# Patient Record
Sex: FEMALE | Race: BLACK OR AFRICAN AMERICAN | Employment: OTHER | URBAN - METROPOLITAN AREA
[De-identification: names, ages, dates, MRNs, and addresses within clinical notes are randomized per-mention and may not be internally consistent; named-entity substitution may affect disease eponyms.]

---

## 2017-01-18 RX ORDER — LEVOCETIRIZINE DIHYDROCHLORIDE 5 MG/1
TABLET, FILM COATED ORAL
Qty: 30 TAB | Refills: 7 | Status: SHIPPED | OUTPATIENT
Start: 2017-01-18 | End: 2017-10-15 | Stop reason: SDUPTHER

## 2017-01-24 RX ORDER — PRAVASTATIN SODIUM 20 MG/1
TABLET ORAL
Qty: 90 TAB | Refills: 3 | Status: SHIPPED | OUTPATIENT
Start: 2017-01-24 | End: 2017-03-13 | Stop reason: SDUPTHER

## 2017-02-15 DIAGNOSIS — I10 ESSENTIAL HYPERTENSION: ICD-10-CM

## 2017-02-15 RX ORDER — FUROSEMIDE 20 MG/1
TABLET ORAL
Qty: 90 TAB | Refills: 2 | Status: SHIPPED | OUTPATIENT
Start: 2017-02-15 | End: 2017-11-25 | Stop reason: SDUPTHER

## 2017-02-17 DIAGNOSIS — F32.A DEPRESSION, UNSPECIFIED DEPRESSION TYPE: ICD-10-CM

## 2017-02-17 RX ORDER — CITALOPRAM 20 MG/1
TABLET, FILM COATED ORAL
Qty: 90 TAB | Refills: 3 | Status: SHIPPED | OUTPATIENT
Start: 2017-02-17 | End: 2018-02-24 | Stop reason: SDUPTHER

## 2017-03-13 ENCOUNTER — OFFICE VISIT (OUTPATIENT)
Dept: FAMILY MEDICINE CLINIC | Age: 62
End: 2017-03-13

## 2017-03-13 VITALS
OXYGEN SATURATION: 96 % | WEIGHT: 253.6 LBS | BODY MASS INDEX: 40.76 KG/M2 | DIASTOLIC BLOOD PRESSURE: 87 MMHG | SYSTOLIC BLOOD PRESSURE: 140 MMHG | HEART RATE: 75 BPM | HEIGHT: 66 IN | TEMPERATURE: 98.2 F | RESPIRATION RATE: 16 BRPM

## 2017-03-13 DIAGNOSIS — Z51.81 ENCOUNTER FOR MEDICATION MONITORING: ICD-10-CM

## 2017-03-13 DIAGNOSIS — Z11.59 NEED FOR HEPATITIS C SCREENING TEST: ICD-10-CM

## 2017-03-13 DIAGNOSIS — J06.9 UPPER RESPIRATORY TRACT INFECTION, UNSPECIFIED TYPE: ICD-10-CM

## 2017-03-13 DIAGNOSIS — L02.224 BOIL, GROIN: ICD-10-CM

## 2017-03-13 DIAGNOSIS — I10 ESSENTIAL HYPERTENSION: Primary | ICD-10-CM

## 2017-03-13 DIAGNOSIS — E78.2 MIXED HYPERLIPIDEMIA: ICD-10-CM

## 2017-03-13 DIAGNOSIS — E11.9 DIET-CONTROLLED DIABETES MELLITUS (HCC): ICD-10-CM

## 2017-03-13 DIAGNOSIS — F32.A DEPRESSION, UNSPECIFIED DEPRESSION TYPE: ICD-10-CM

## 2017-03-13 DIAGNOSIS — Z12.31 ENCOUNTER FOR SCREENING MAMMOGRAM FOR BREAST CANCER: ICD-10-CM

## 2017-03-13 LAB — HBA1C MFR BLD HPLC: 6 %

## 2017-03-13 RX ORDER — ALPRAZOLAM 0.5 MG/1
0.5 TABLET ORAL
Qty: 90 TAB | Refills: 1 | Status: SHIPPED | OUTPATIENT
Start: 2017-03-13 | End: 2018-02-20 | Stop reason: SDUPTHER

## 2017-03-13 RX ORDER — CEPHALEXIN 500 MG/1
500 CAPSULE ORAL 3 TIMES DAILY
Qty: 30 CAP | Refills: 0 | Status: SHIPPED | OUTPATIENT
Start: 2017-03-13 | End: 2017-03-23

## 2017-03-13 NOTE — PATIENT INSTRUCTIONS

## 2017-03-13 NOTE — MR AVS SNAPSHOT
Visit Information Date & Time Provider Department Dept. Phone Encounter #  
 3/13/2017 10:45 AM Bacilio Santillan MD [facility] [phone] [id number] Follow-up Instructions Return in about 3 months (around 6/13/2017). Your Appointments 6/19/2017 11:00 AM  
ROUTINE CARE with Bacilio Santillan MD  
[facility] Appt Note: 3m f/u  
 [address] [facility] [address] [phone] [address] [facility] [address] Upcoming Health Maintenance Date Due Hepatitis C Screening 1955 LIPID PANEL Q1 2/5/2017 HEMOGLOBIN A1C Q6M 6/2/2017 BREAST CANCER SCRN MAMMOGRAM 5/16/2018 PAP AKA CERVICAL CYTOLOGY 2/5/2019 COLONOSCOPY 4/2/2025 DTaP/Tdap/Td series (2 - Td) 12/2/2026 Allergies as of 3/13/2017  Review Complete On: 3/13/2017 By: Bacilio Santillan MD  
  
 Severity Noted Reaction Type Reactions Ciprofloxacin  08/30/2016    Other (comments) Insomnia and hot flashes Demerol [Meperidine]  06/14/2010    Other (comments)  
 hallucinations Doxycycline  06/14/2010    Unknown (comments) \"Feeling flushed\" Floxin [Ofloxacin]  06/14/2010    Other (comments)  
 insomnia Current Immunizations  Reviewed on 3/13/2017 Name Date Influenza Vaccine 11/19/2014, 10/15/2013 Influenza Vaccine (Quad) PF 12/5/2016 Pneumococcal Polysaccharide (PPSV-23) 12/5/2016 Reviewed by Bacilio Santillan MD on 3/13/2017 at 11:39 AM  
You Were Diagnosed With   
  
 Codes Comments Benign essential hematuria    -  Primary ICD-10-CM: R31.9 ICD-9-CM: 599.70 Mixed hyperlipidemia     ICD-10-CM: E78.2 ICD-9-CM: 272.2 Hypovitaminosis D     ICD-10-CM: E55.9 ICD-9-CM: 268.9 Diet-controlled diabetes mellitus ([facility] [address]     ICD-10-CM: E11.9 ICD-9-CM: 250.00  Encounter for medication monitoring     ICD-10-CM: Z51.81 
 ICD-9-CM: V58.83 Upper respiratory tract infection, unspecified type     ICD-10-CM: J06.9 ICD-9-CM: 465.9 Need for hepatitis C screening test     ICD-10-CM: Z11.59 
ICD-9-CM: V73.89 Boil, groin     ICD-10-CM: L02.234 ICD-9-CM: 680.2 Depression, unspecified depression type     ICD-10-CM: F32.9 ICD-9-CM: 992 Vitals BP Pulse Temp Resp Height(growth percentile) Weight(growth percentile) 140/87 (BP 1 Location: Left arm, BP Patient Position: Sitting) 75 98.2 °F (36.8 °C) (Oral) 16 5' 6\" (1.676 m) 253 lb 9.6 oz (115 kg) LMP SpO2 BMI OB Status Smoking Status 11/19/1998 96% 40.93 kg/m2 Hysterectomy Never Smoker Vitals History BMI and BSA Data Body Mass Index Body Surface Area 40.93 kg/m 2 2.31 m 2 Preferred Pharmacy Pharmacy Name Phone [facility] [address] [place] [address], [address] [place] [address] [phone] Your Updated Medication List  
  
   
This list is accurate as of: 3/13/17  6:13 PM.  Always use your most recent med list.  
  
  
  
  
 ALPRAZolam 0.5 mg tablet Commonly known as:  Lehman Leaks Take 1 Tab by mouth nightly as needed. aspirin 81 mg chewable tablet Take 81 mg by mouth daily. CALTRATE PLUS PO Take 1 Tab by mouth daily. cephALEXin 500 mg capsule Commonly known as:  Anant Fogo Take 1 Cap by mouth three (3) times daily for 10 days. cholecalciferol (VITAMIN D3) 5,000 unit Tab tablet Commonly known as:  VITAMIN D3 Take 5,000 Units by mouth daily. citalopram 20 mg tablet Commonly known as:  CELEXA  
TAKE ONE TABLET BY MOUTH DAILY  
  
 cyanocobalamin 1,000 mcg tablet Take 1,000 mcg by mouth daily. cyclobenzaprine 10 mg tablet Commonly known as:  FLEXERIL Take 1 Tab by mouth three (3) times daily as needed for Muscle Spasm(s). fluticasone 50 mcg/actuation nasal spray Commonly known as:  Cathstarrn Josef Place 2 sprays in each nostril once daily. furosemide 20 mg tablet Commonly known as:  LASIX TAKE ONE TABLET BY MOUTH DAILY HYDROcodone-acetaminophen 7.5-325 mg per tablet Commonly known as:  Julradha Kava Take 1 Tab by mouth every six (6) hours as needed for Pain. Max Daily Amount: 4 Tabs. levocetirizine 5 mg tablet Commonly known as:  Devora Reagin TAKE ONE TABLET BY MOUTH DAILY  
  
 lisinopril 10 mg tablet Commonly known as:  Arnold Files Take 1 Tab by mouth daily. MULTIVITAMIN PO Take 1 tablet by mouth daily. omeprazole 40 mg capsule Commonly known as:  PRILOSEC  
TAKE ONE CAPSULE BY MOUTH EVERY DAY  
  
 pravastatin 20 mg tablet Commonly known as:  PRAVACHOL  
TAKE ONE TABLET BY MOUTH EVERY NIGHT AT BEDTIME  
  
 PROBIOTIC PO Take 1 Tab by mouth daily. Prescriptions Printed Refills ALPRAZolam (XANAX) 0.5 mg tablet 1 Sig: Take 1 Tab by mouth nightly as needed. Class: Print Route: Oral  
  
Prescriptions Sent to Pharmacy Refills  
 cephALEXin (KEFLEX) 500 mg capsule 0 Sig: Take 1 Cap by mouth three (3) times daily for 10 days. Class: Normal  
 Pharmacy: [facility] [address] [place] [address], [address] [place] [address] #: [phone] Route: Oral  
  
We Performed the Following AMB POC HEMOGLOBIN A1C [62907 CPT(R)] CBC W/O DIFF [25978 CPT(R)] HEPATITIS C AB [73736 CPT(R)] LIPID PANEL [03896 CPT(R)] METABOLIC PANEL, COMPREHENSIVE [57495 CPT(R)] Follow-up Instructions Return in about 3 months (around 6/13/2017). Patient Instructions Skin Abscess: Care Instructions Your Care Instructions A skin abscess is a bacterial infection that forms a pocket of pus. A boil is a kind of skin abscess. The doctor may have cut an opening in the abscess so that the pus can drain out. You may have gauze in the cut so that the abscess will stay open and keep draining. You may need antibiotics.  You will need to follow up with your doctor to make sure the infection has gone away. The doctor has checked you carefully, but problems can develop later. If you notice any problems or new symptoms, get medical treatment right away. Follow-up care is a key part of your treatment and safety. Be sure to make and go to all appointments, and call your doctor if you are having problems. It's also a good idea to know your test results and keep a list of the medicines you take. How can you care for yourself at home? · Apply warm and dry compresses, a heating pad set on low, or a hot water bottle 3 or 4 times a day for pain. Keep a cloth between the heat source and your skin. · If your doctor prescribed antibiotics, take them as directed. Do not stop taking them just because you feel better. You need to take the full course of antibiotics. · Take pain medicines exactly as directed. ¨ If the doctor gave you a prescription medicine for pain, take it as prescribed. ¨ If you are not taking a prescription pain medicine, ask your doctor if you can take an over-the-counter medicine. · Keep your bandage clean and dry. Change the bandage whenever it gets wet or dirty, or at least one time a day. · If the abscess was packed with gauze: 
¨ Keep follow-up appointments to have the gauze changed or removed. If the doctor instructed you to remove the gauze, gently pull out all of the gauze when your doctor tells you to. ¨ After the gauze is removed, soak the area in warm water for 15 to 20 minutes 2 times a day, until the wound closes. When should you call for help? Call your doctor now or seek immediate medical care if: 
· You have signs of worsening infection, such as: 
¨ Increased pain, swelling, warmth, or redness. ¨ Red streaks leading from the infected skin. ¨ Pus draining from the wound. ¨ A fever. Watch closely for changes in your health, and be sure to contact your doctor if: 
· You do not get better as expected. Where can you learn more? Go to http://royal-maeve.info/. Enter T154 in the search box to learn more about \"Skin Abscess: Care Instructions. \" Current as of: February 5, 2016 Content Version: 11.1 © 3586-6126 [employer]. Care instructions adapted under license by [employer] (which disclaims liability or warranty for this information). If you have questions about a medical condition or this instruction, always ask your healthcare professional. [address] any warranty or liability for your use of this information. Introducing [facility]! Dear Pascual Jose: 
Thank you for requesting a [employer] account. Our records indicate that you already have an active [employer] account. You can access your account anytime at [email or web address] Did you know that you can access your hospital and ER discharge instructions at any time in [employer]? You can also review all of your test results from your hospital stay or ER visit. Additional Information If you have questions, please visit the Frequently Asked Questions section of the [employer] website at [email or web address] Remember, [employer] is NOT to be used for urgent needs. For medical emergencies, dial 911. Now available from your iPhone and Android! Please provide this summary of care documentation to your next provider. Your primary care clinician is listed as Trina Teran. If you have any questions after today's visit, please call [phone].

## 2017-03-13 NOTE — PROGRESS NOTES
HISTORY OF PRESENT ILLNESS  Dana Slade is a 57 y.o. female. HPI   Follow up on chronic medical problems. C/o nasal congestion and cough for the past few days. Coughing up phlegm. No fever or chills noted. Has malaise. Throat is scratchy. Has post nasal drainage. No chest pain or SOB. No wheezing noted. Also c/o boil in the upper part of the right thigh. Has some soreness noted when she is sitting. Cardiovascular Review:  The patient has hypertension and hyperlipidemia and hypertensive retinopathy. Diet and Lifestyle: generally follows a low fat low cholesterol diet, generally follows a low sodium diet, follows a diabetic diet regularly, exercises sporadically  Home BP Monitoring: is not measured at home. Pertinent ROS: taking medications as instructed, no medication side effects noted, no TIA's, no chest pain on exertion, no dyspnea on exertion, no swelling of ankles. C/o loud snoring and suspects that she has sleep apnea. DM type II follow up:  Diet controlled. Compliant w/ diabetic diet, and exercise. Has not been cheking BS recently. Denies any tingling sensation, polyuria and polydipsia. No blurred vision. No significant weight changes. Feeling well since last OV. Depression Review:  Patient is seen for followup of depression. Treatment includes Celexa. Ongoing symptoms include occasional insomnia. She denies depressed mood, insomnia and hopelessness. She experiences the following side effects from the treatment: none.     Patient Active Problem List   Diagnosis Code    Environmental allergies Z91.09    Depression F32.9    HTN (hypertension) I10    C section     H/O: hysterectomy Z90.710    Hypovitaminosis D E55.9    Mixed hyperlipidemia E78.2    Benign essential hematuria R31.9    Diet-controlled diabetes mellitus ([facility] [address] E11.9    Hypertensive retinopathy H35.039       Current Outpatient Prescriptions   Medication Sig Dispense Refill    LACTOBACILLUS ACIDOPHILUS (PROBIOTIC PO) Take 1 Tab by mouth daily.  cephALEXin (KEFLEX) 500 mg capsule Take 1 Cap by mouth three (3) times daily for 10 days. 30 Cap 0    ALPRAZolam (XANAX) 0.5 mg tablet Take 1 Tab by mouth nightly as needed. 90 Tab 1    citalopram (CELEXA) 20 mg tablet TAKE ONE TABLET BY MOUTH DAILY 90 Tab 3    furosemide (LASIX) 20 mg tablet TAKE ONE TABLET BY MOUTH DAILY 90 Tab 2    fluticasone (FLONASE) 50 mcg/actuation nasal spray Place 2 sprays in each nostril once daily. 1 Bottle 6    levocetirizine (XYZAL) 5 mg tablet TAKE ONE TABLET BY MOUTH DAILY 30 Tab 7    lisinopril (PRINIVIL, ZESTRIL) 10 mg tablet Take 1 Tab by mouth daily. 90 Tab 1    cyanocobalamin 1,000 mcg tablet Take 1,000 mcg by mouth daily.  cyclobenzaprine (FLEXERIL) 10 mg tablet Take 1 Tab by mouth three (3) times daily as needed for Muscle Spasm(s). 30 Tab 0    HYDROcodone-acetaminophen (NORCO) 7.5-325 mg per tablet Take 1 Tab by mouth every six (6) hours as needed for Pain. Max Daily Amount: 4 Tabs. 20 Tab 0    pravastatin (PRAVACHOL) 20 mg tablet TAKE ONE TABLET BY MOUTH EVERY NIGHT AT BEDTIME 90 tablet 1    MULTIVITAMIN PO Take 1 tablet by mouth daily.  omeprazole (PRILOSEC) 40 mg capsule TAKE ONE CAPSULE BY MOUTH EVERY DAY 90 capsule 2    cholecalciferol, VITAMIN D3, (VITAMIN D3) 5,000 unit tab tablet Take 5,000 Units by mouth daily.  aspirin 81 mg chewable tablet Take 81 mg by mouth daily.  CALCIUM CARB/VIT D3/MINERALS (CALTRATE PLUS PO) Take 1 Tab by mouth daily.          Allergies   Allergen Reactions    Ciprofloxacin Other (comments)     Insomnia and hot flashes    Demerol [Meperidine] Other (comments)     hallucinations    Doxycycline Unknown (comments)       \"Feeling flushed\"    Floxin [Ofloxacin] Other (comments)     insomnia       Past Medical History:   Diagnosis Date    C section 3/12/2010    Depression 3/12/2010    Diabetes ([facility] [address]     Environmental allergies 3/12/2010    H/O: hysterectomy 3/12/2010    High cholesterol 3/12/2010    HTN (hypertension) 3/12/2010       Past Surgical History:   Procedure Laterality Date    ENDOSCOPY, COLON, DIAGNOSTIC  2006    repeat 10 yrs (Dr. 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-     HX CYSTOSTOMY  3/2015    HX HYSTERECTOMY  1998       Family History   Problem Relation Age of Onset    Hypertension Mother     Other Mother      obesity, chf    Heart Disease Mother      CHF    Kidney Disease Mother     High Cholesterol Mother     Cancer Father      lung    Breast Cancer Sister     MS Sister     High Cholesterol Sister     Hypertension Sister     Hypertension Brother     Liver Disease Brother      Hep C    High Cholesterol Brother     Hypertension Sister     Diabetes Sister     High Cholesterol Sister     Hypertension Sister     Other Sister      obese    High Cholesterol Sister        Social History   Substance Use Topics    Smoking status: Never Smoker    Smokeless tobacco: Never Used    Alcohol use 0.0 oz/week     0 Standard drinks or equivalent per week      Comment: occasional        Lab Results  Component Value Date/Time   WBC 8.5 2016 11:13 AM   Hemoglobin (POC) 15.0 2016 05:26 PM   HGB 14.5 2016 11:13 AM   Hematocrit (POC) 44 2016 05:26 PM   HCT 43.0 2016 11:13 AM   PLATELET 790 15/87/7 11:13 AM   MCV 87 2016 11:13 AM       Lab Results  Component Value Date/Time   Hemoglobin A1c 6.7 10/15/2013 12:55 PM   Hemoglobin A1c 6.2 2013 03:36 PM   Hemoglobin A1c 6.3 2012 08:29 AM   Glucose 101 2016 12:15 PM   Glucose (POC) 97 2016 05:26 PM   Glucose  2016 04:40 PM   Microalb/Creat ratio (ug/mg creat.) 6.3 2016 04:47 PM   LDL, calculated 128 2016 11:13 AM   Creatinine (POC) 0.8 2016 05:26 PM   Creatinine 0.73 12/23/2016 12:15 PM      Lab Results  Component Value Date/Time   Cholesterol, total 202 02/05/2016 11:13 AM   HDL Cholesterol 44 02/05/2016 11:13 AM   LDL, calculated 128 02/05/2016 11:13 AM   Triglyceride 151 02/05/2016 11:13 AM   CHOL/HDL Ratio 4.9 09/13/2010 11:06 AM       Lab Results  Component Value Date/Time   TSH 1.430 03/22/2011 11:04 AM      Lab Results   Component Value Date/Time    Sodium 140 12/23/2016 12:15 PM    Potassium 4.9 12/23/2016 12:15 PM    Chloride 100 12/23/2016 12:15 PM    CO2 28 12/23/2016 12:15 PM    Anion gap 5 03/11/2015 01:35 PM    Glucose 101 12/23/2016 12:15 PM    BUN 12 12/23/2016 12:15 PM    Creatinine 0.73 12/23/2016 12:15 PM    BUN/Creatinine ratio 16 12/23/2016 12:15 PM    GFR est  12/23/2016 12:15 PM    GFR est non-AA 89 12/23/2016 12:15 PM    Calcium 9.8 12/23/2016 12:15 PM    Bilirubin, total 0.3 12/02/2016 04:47 PM    ALT (SGPT) 14 12/02/2016 04:47 PM    AST (SGOT) 14 12/02/2016 04:47 PM    Alk. phosphatase 81 12/02/2016 04:47 PM    Protein, total 6.7 12/02/2016 04:47 PM    Albumin 4.5 12/02/2016 04:47 PM    Globulin 3.5 03/11/2015 01:35 PM    A-G Ratio 2.0 12/02/2016 04:47 PM      Lab Results   Component Value Date/Time    Hemoglobin A1c 6.7 10/15/2013 12:55 PM    Hemoglobin A1c (POC) 6.0 03/13/2017 12:32 PM         Review of Systems   Constitutional: Negative for malaise/fatigue. HENT: Positive for congestion. Eyes: Negative for blurred vision. Respiratory: Positive for cough and sputum production. Negative for shortness of breath and wheezing. Cardiovascular: Negative for chest pain, palpitations and leg swelling. Gastrointestinal: Negative for abdominal pain, constipation and heartburn. Genitourinary: Negative for dysuria, frequency and urgency. Musculoskeletal: Negative for back pain and joint pain. Neurological: Negative for dizziness, tingling and headaches. Endo/Heme/Allergies: Positive for environmental allergies. Psychiatric/Behavioral: Negative for depression. The patient does not have insomnia. Physical Exam   Constitutional: She appears well-developed and well-nourished. /87 (BP 1 Location: Left arm, BP Patient Position: Sitting)  Pulse 75  Temp 98.2 °F (36.8 °C) (Oral)   Resp 16  Ht 5' 6\" (1.676 m)  Wt 253 lb 9.6 oz (115 kg)  LMP 11/19/1998  SpO2 96%  BMI 40.93 kg/m2     HENT:   Right Ear: Tympanic membrane and ear canal normal.   Left Ear: Tympanic membrane and ear canal normal.   Nose: Mucosal edema and rhinorrhea present. Mouth/Throat: Mucous membranes are normal. Posterior oropharyngeal erythema present. No oropharyngeal exudate. Neck: Trachea normal, normal range of motion and full passive range of motion without pain. Neck supple. No edema present. No thyromegaly present. Cardiovascular: Normal rate and regular rhythm. No murmur heard. Pulmonary/Chest: Effort normal and breath sounds normal. She has no wheezes. She has no rales. Abdominal: Soft. Normal appearance and bowel sounds are normal. There is no tenderness. Musculoskeletal: Normal range of motion. She exhibits no edema. Lymphadenopathy:     She has no cervical adenopathy. Skin: Skin is warm and dry. palpable cystic lesion in the upper inner right thigh. Has some tenderness. Psychiatric: She has a normal mood and affect. Nursing note and vitals reviewed. ASSESSMENT and PLAN  Fredi Berry was seen today for hypertension, cough and ear fullness. Diagnoses and all orders for this visit:    Benign essential hypertension  Discussed sodium restriction, high k rich diet, maintaining ideal body weight and regular exercise program such as daily walking 30 min perday 4-5 times per week, as physiologic means to achieve blood pressure control.  Medication compliance advised.      Diet-controlled diabetes mellitus ([address]  -     AMB POC HEMOGLOBIN A1C    Mixed hyperlipidemia  -     LIPID PANEL    Depression, unspecified depression type  -     Refill ALPRAZolam (XANAX) 0.5 mg tablet; Take 1 Tab by mouth nightly as needed. Boil, groin  -     cephALEXin (KEFLEX) 500 mg capsule; Take 1 Cap by mouth three (3) times daily for 10 days. Warm compresses     Upper respiratory tract infection, unspecified type  -     cephALEXin (KEFLEX) 500 mg capsule; Take 1 Cap by mouth three (3) times daily for 10 days. Add mucinex as needed. Encounter for medication monitoring  -     METABOLIC PANEL, COMPREHENSIVE  -     CBC W/O DIFF    Need for hepatitis C screening test  -     HEPATITIS C AB    Encounter for screening mammogram for breast cancer  -     [facility] MAMMO BI SCREENING INCL CAD; Future      Follow-up Disposition:  Return in about 3 months (around 6/13/2017). reviewed diet, exercise and weight control  cardiovascular risk and specific lipid/LDL goals reviewed  reviewed medications and side effects in detail  specific diabetic recommendations: low cholesterol diet, weight control and daily exercise discussed, home glucose monitoring emphasized and glycohemoglobin and other lab monitoring discussed     I have discussed diagnosis listed in this note with pt and/or family. I have discussed treatment plans and options and the risk/benefit analysis of those options, including safe use of medications and possible medication side effects. Through the use of shared decision making we have agreed to the above plan. The patient has received an after-visit summary and questions were answered concerning future plans and follow up. Advise pt of any urgent changes then to proceed to the ER.

## 2017-03-13 NOTE — PROGRESS NOTES
Chief Complaint   Patient presents with    Hypertension     follow up    Cough     pt c/o coughing for about 1 week and coughing up light yellow sputum at times    Ear Fullness     pt c/o left ear feels clogged         BMP 12/23/2016    A1C 12/2/2016    Lipid  2/5/2016    Mammogram 5/16/2016    Colonoscopy 4/2/2015 by Dr. Janes Olmedo- repeat in 10 years     Pt states last eye exam was 12/2/2015 by Dr. Rubens Serna.

## 2017-03-14 LAB
ALBUMIN SERPL-MCNC: 4.4 G/DL (ref 3.6–4.8)
ALBUMIN/GLOB SERPL: 1.8 {RATIO} (ref 1.2–2.2)
ALP SERPL-CCNC: 68 IU/L (ref 39–117)
ALT SERPL-CCNC: 15 IU/L (ref 0–32)
AST SERPL-CCNC: 14 IU/L (ref 0–40)
BILIRUB SERPL-MCNC: 0.4 MG/DL (ref 0–1.2)
BUN SERPL-MCNC: 13 MG/DL (ref 8–27)
BUN/CREAT SERPL: 17 (ref 11–26)
CALCIUM SERPL-MCNC: 9.6 MG/DL (ref 8.7–10.3)
CHLORIDE SERPL-SCNC: 101 MMOL/L (ref 96–106)
CHOLEST SERPL-MCNC: 199 MG/DL (ref 100–199)
CO2 SERPL-SCNC: 26 MMOL/L (ref 18–29)
CREAT SERPL-MCNC: 0.76 MG/DL (ref 0.57–1)
ERYTHROCYTE [DISTWIDTH] IN BLOOD BY AUTOMATED COUNT: 14.1 % (ref 12.3–15.4)
GLOBULIN SER CALC-MCNC: 2.4 G/DL (ref 1.5–4.5)
GLUCOSE SERPL-MCNC: 99 MG/DL (ref 65–99)
HCT VFR BLD AUTO: 42.7 % (ref 34–46.6)
HCV AB S/CO SERPL IA: <0.1 S/CO RATIO (ref 0–0.9)
HDLC SERPL-MCNC: 39 MG/DL
HGB BLD-MCNC: 14.2 G/DL (ref 11.1–15.9)
INTERPRETATION, 910389: NORMAL
LDLC SERPL CALC-MCNC: 130 MG/DL (ref 0–99)
MCH RBC QN AUTO: 29.4 PG (ref 26.6–33)
MCHC RBC AUTO-ENTMCNC: 33.3 G/DL (ref 31.5–35.7)
MCV RBC AUTO: 88 FL (ref 79–97)
PLATELET # BLD AUTO: 298 X10E3/UL (ref 150–379)
POTASSIUM SERPL-SCNC: 4.6 MMOL/L (ref 3.5–5.2)
PROT SERPL-MCNC: 6.8 G/DL (ref 6–8.5)
RBC # BLD AUTO: 4.83 X10E6/UL (ref 3.77–5.28)
SODIUM SERPL-SCNC: 142 MMOL/L (ref 134–144)
TRIGL SERPL-MCNC: 151 MG/DL (ref 0–149)
VLDLC SERPL CALC-MCNC: 30 MG/DL (ref 5–40)
WBC # BLD AUTO: 8.9 X10E3/UL (ref 3.4–10.8)

## 2017-04-03 ENCOUNTER — APPOINTMENT (OUTPATIENT)
Dept: GENERAL RADIOLOGY | Age: 62
End: 2017-04-03
Attending: PHYSICIAN ASSISTANT

## 2017-04-03 ENCOUNTER — HOSPITAL ENCOUNTER (EMERGENCY)
Age: 62
Discharge: HOME | End: 2017-04-03
Attending: FAMILY MEDICINE

## 2017-04-03 VITALS
DIASTOLIC BLOOD PRESSURE: 99 MMHG | SYSTOLIC BLOOD PRESSURE: 175 MMHG | BODY MASS INDEX: 40.74 KG/M2 | OXYGEN SATURATION: 97 % | WEIGHT: 253.5 LBS | HEART RATE: 99 BPM | TEMPERATURE: 100.2 F | HEIGHT: 66 IN | RESPIRATION RATE: 16 BRPM

## 2017-04-03 DIAGNOSIS — J20.9 ACUTE BRONCHITIS, UNSPECIFIED ORGANISM: Primary | ICD-10-CM

## 2017-04-03 LAB
INFLUENZA A AG (POC): NEGATIVE
INFLUENZA AG (POC) NEGATIVE CTRL.: NORMAL
INFLUENZA AG (POC) POSITIVE CTRL.: NORMAL
INFLUENZA B AG (POC): NEGATIVE
LOT NUMBER POC: NORMAL

## 2017-04-03 RX ORDER — HYDROCODONE POLISTIREX AND CHLORPHENIRAMINE POLISTIREX 10; 8 MG/5ML; MG/5ML
5 SUSPENSION, EXTENDED RELEASE ORAL
Qty: 60 ML | Refills: 0 | Status: SHIPPED | OUTPATIENT
Start: 2017-04-03 | End: 2017-04-28

## 2017-04-03 RX ORDER — ALBUTEROL SULFATE 90 UG/1
2 AEROSOL, METERED RESPIRATORY (INHALATION)
Qty: 1 INHALER | Refills: 0 | Status: SHIPPED | OUTPATIENT
Start: 2017-04-03 | End: 2017-11-30

## 2017-04-03 RX ORDER — AZITHROMYCIN 250 MG/1
TABLET, FILM COATED ORAL
Qty: 6 TAB | Refills: 0 | Status: SHIPPED | OUTPATIENT
Start: 2017-04-03 | End: 2017-04-28 | Stop reason: ALTCHOICE

## 2017-04-03 NOTE — DISCHARGE INSTRUCTIONS
Bronchitis: Care Instructions  Your Care Instructions    Bronchitis is inflammation of the bronchial tubes, which carry air to the lungs. The tubes swell and produce mucus, or phlegm. The mucus and inflamed bronchial tubes make you cough. You may have trouble breathing. Most cases of bronchitis are caused by viruses like those that cause colds. Antibiotics usually do not help and they may be harmful. Bronchitis usually develops rapidly and lasts about 2 to 3 weeks in otherwise healthy people. Follow-up care is a key part of your treatment and safety. Be sure to make and go to all appointments, and call your doctor if you are having problems. It's also a good idea to know your test results and keep a list of the medicines you take. How can you care for yourself at home? · Take all medicines exactly as prescribed. Call your doctor if you think you are having a problem with your medicine. · Get some extra rest.  · Take an over-the-counter pain medicine, such as acetaminophen (Tylenol), ibuprofen (Advil, Motrin), or naproxen (Aleve) to reduce fever and relieve body aches. Read and follow all instructions on the label. · Do not take two or more pain medicines at the same time unless the doctor told you to. Many pain medicines have acetaminophen, which is Tylenol. Too much acetaminophen (Tylenol) can be harmful. · Take an over-the-counter cough medicine that contains dextromethorphan to help quiet a dry, hacking cough so that you can sleep. Avoid cough medicines that have more than one active ingredient. Read and follow all instructions on the label. · Breathe moist air from a humidifier, hot shower, or sink filled with hot water. The heat and moisture will thin mucus so you can cough it out. · Do not smoke. Smoking can make bronchitis worse. If you need help quitting, talk to your doctor about stop-smoking programs and medicines. These can increase your chances of quitting for good.   When should you call for help? Call 911 anytime you think you may need emergency care. For example, call if:  · You have severe trouble breathing. Call your doctor now or seek immediate medical care if:  · You have new or worse trouble breathing. · You cough up dark brown or bloody mucus (sputum). · You have a new or higher fever. · You have a new rash. Watch closely for changes in your health, and be sure to contact your doctor if:  · You cough more deeply or more often, especially if you notice more mucus or a change in the color of your mucus. · You are not getting better as expected. Where can you learn more? Go to http://royal-maeve.info/. Enter H333 in the search box to learn more about \"Bronchitis: Care Instructions. \"  Current as of: May 23, 2016  Content Version: 11.2  © 9576-6799 [employer]. Care instructions adapted under license by [employer] (which disclaims liability or warranty for this information). If you have questions about a medical condition or this instruction, always ask your healthcare professional. [address] any warranty or liability for your use of this information.

## 2017-04-03 NOTE — UC PROVIDER NOTE
Patient is a 60 y.o. female presenting with cough. The history is provided by the patient. Cough   This is a new problem. The current episode started yesterday. The problem occurs every few minutes. The problem has been gradually worsening. The cough is non-productive. There has been a fever of 100 - 100.9 F. Associated symptoms include chills. Pertinent negatives include no chest pain, no sweats, no rhinorrhea and no sore throat. She has tried nothing for the symptoms. She is not a smoker. Her past medical history does not include bronchitis, pneumonia, asthma or heart failure.         Past Medical History:   Diagnosis Date    C section 3/12/2010    Depression 3/12/2010    Diabetes ([address]     Environmental allergies 3/12/2010    H/O: hysterectomy 3/12/2010    High cholesterol 3/12/2010    HTN (hypertension) 3/12/2010        Past Surgical History:   Procedure Laterality Date    ENDOSCOPY, COLON, DIAGNOSTIC      repeat 10 yrs (Dr. Yeni Jackman)    HX BREAST BIOPSY  1980    both breast    HX BREAST BIOPSY  1986    left breast    HX  SECTION      x 3    HX CORNEAL TRANSPLANT  2011    left eye-     HX CYSTOSTOMY  3/2015    HX HYSTERECTOMY  1998         Family History   Problem Relation Age of Onset    Hypertension Mother     Other Mother      obesity, chf    Heart Disease Mother      CHF    Kidney Disease Mother     High Cholesterol Mother     Cancer Father      lung    Breast Cancer Sister     MS Sister     High Cholesterol Sister     Hypertension Sister     Hypertension Brother     Liver Disease Brother      Hep C    High Cholesterol Brother     Hypertension Sister     Diabetes Sister     High Cholesterol Sister     Hypertension Sister     Other Sister      obese    High Cholesterol Sister         Social History     Social History    Marital status:      Spouse name: N/A    Number of children: N/A    Years of education: N/A     Occupational History    Not on file. Social History Main Topics    Smoking status: Never Smoker    Smokeless tobacco: Never Used    Alcohol use 0.0 oz/week     0 Standard drinks or equivalent per week      Comment: occasional    Drug use: No    Sexual activity: Yes     Partners: Male     Birth control/ protection: Surgical     Other Topics Concern    Not on file     Social History Narrative                ALLERGIES: Ciprofloxacin; Demerol [meperidine]; Doxycycline; and Floxin [ofloxacin]    Review of Systems   Constitutional: Positive for chills. HENT: Negative for rhinorrhea and sore throat. Respiratory: Positive for cough. Cardiovascular: Negative for chest pain. Vitals:    04/03/17 1833 04/03/17 1951   BP: (!) 195/100 (!) 175/99   Pulse: 98 99   Resp: 16    Temp: 100.2 °F (37.9 °C)    SpO2: 97%    Weight: 115 kg (253 lb 8 oz)    Height: 5' 6\" (1.676 m)        Physical Exam   Constitutional: She is oriented to person, place, and time. She appears well-developed and well-nourished. HENT:   Right Ear: External ear normal.   Left Ear: External ear normal.   Cardiovascular: Normal rate, regular rhythm and normal heart sounds. Pulmonary/Chest: Effort normal and breath sounds normal. No respiratory distress. She has no wheezes. She has no rales. She exhibits no tenderness. Abdominal: Soft. Bowel sounds are normal.   Neurological: She is alert and oriented to person, place, and time. Skin: Skin is warm and dry. Psychiatric: She has a normal mood and affect. Her behavior is normal. Judgment and thought content normal.   Nursing note and vitals reviewed. MDM     Differential Diagnosis; Clinical Impression; Plan:     CLINICAL IMPRESSION:  Acute bronchitis, unspecified organism  (primary encounter diagnosis)    Plan:  1. Zithromax  2.  Tussionex  3. ALbuterol MDI  Amount and/or Complexity of Data Reviewed:   Clinical lab tests:  Ordered and reviewed  Tests in the radiology section of CPT®:  Ordered and reviewed  Risk of Significant Complications, Morbidity, and/or Mortality:   Presenting problems: Moderate  Diagnostic procedures: Moderate  Management options:   Moderate  Progress:   Patient progress:  Stable      Procedures

## 2017-04-28 ENCOUNTER — OFFICE VISIT (OUTPATIENT)
Dept: FAMILY MEDICINE CLINIC | Age: 62
End: 2017-04-28

## 2017-04-28 VITALS
RESPIRATION RATE: 16 BRPM | HEART RATE: 76 BPM | SYSTOLIC BLOOD PRESSURE: 120 MMHG | TEMPERATURE: 97.7 F | WEIGHT: 252.8 LBS | BODY MASS INDEX: 40.63 KG/M2 | OXYGEN SATURATION: 95 % | DIASTOLIC BLOOD PRESSURE: 82 MMHG | HEIGHT: 66 IN

## 2017-04-28 DIAGNOSIS — R05.8 ALLERGIC COUGH: Primary | ICD-10-CM

## 2017-04-28 DIAGNOSIS — J18.9 PNEUMONIA DUE TO INFECTIOUS ORGANISM, UNSPECIFIED LATERALITY, UNSPECIFIED PART OF LUNG: ICD-10-CM

## 2017-04-28 DIAGNOSIS — Z91.09 ENVIRONMENTAL ALLERGIES: ICD-10-CM

## 2017-04-28 PROBLEM — Z51.81 ENCOUNTER FOR MEDICATION MONITORING: Status: ACTIVE | Noted: 2017-04-28

## 2017-04-28 RX ORDER — PROMETHAZINE HYDROCHLORIDE AND DEXTROMETHORPHAN HYDROBROMIDE 6.25; 15 MG/5ML; MG/5ML
5 SYRUP ORAL
Qty: 180 ML | Refills: 1 | Status: SHIPPED | OUTPATIENT
Start: 2017-04-28 | End: 2017-08-11

## 2017-04-28 RX ORDER — FLUTICASONE PROPIONATE 50 MCG
SPRAY, SUSPENSION (ML) NASAL
Qty: 1 BOTTLE | Refills: 6 | Status: SHIPPED | OUTPATIENT
Start: 2017-04-28 | End: 2017-06-19 | Stop reason: SDUPTHER

## 2017-04-28 RX ORDER — PREDNISONE 10 MG/1
10 TABLET ORAL 2 TIMES DAILY
Qty: 10 TAB | Refills: 0 | Status: SHIPPED | OUTPATIENT
Start: 2017-04-28 | End: 2017-05-03

## 2017-04-28 NOTE — PROGRESS NOTES
HISTORY OF PRESENT ILLNESS  Corry Renteria is a 60 y.o. female. HPI   Follow up on from UC and dx with PNA on 4/3. Completed her treatment with the [facility]. Was also given albuterol inhaler which she has not use in the past week. Overall feeling better but still has cough and brining up clear mucous. Has nasal drainage. Taking her usual allergy medication. No fever or chills noted. No chest pain or SOB. No wheezing noted. Patient Active Problem List   Diagnosis Code    Environmental allergies Z91.09    Depression F32.9    HTN (hypertension) I10    C section     H/O: hysterectomy Z90.710    Hypovitaminosis D E55.9    Mixed hyperlipidemia E78.2    Benign essential hematuria R31.9    Diet-controlled diabetes mellitus ([address] E11.9    Hypertensive retinopathy H35.039    Encounter for medication monitoring Z51.81       Current Outpatient Prescriptions   Medication Sig Dispense Refill    predniSONE (DELTASONE) 10 mg tablet Take 1 Tab by mouth two (2) times a day for 5 days. 10 Tab 0    promethazine-dextromethorphan (PROMETHAZINE-DM) 6.25-15 mg/5 mL syrup Take 5 mL by mouth every six (6) hours as needed for Cough. 180 mL 1    albuterol (PROVENTIL HFA, VENTOLIN HFA, PROAIR HFA) 90 mcg/actuation inhaler Take 2 Puffs by inhalation every four (4) hours as needed for Wheezing. 1 Inhaler 0    LACTOBACILLUS ACIDOPHILUS (PROBIOTIC PO) Take 1 Tab by mouth daily.  ALPRAZolam (XANAX) 0.5 mg tablet Take 1 Tab by mouth nightly as needed. 90 Tab 1    citalopram (CELEXA) 20 mg tablet TAKE ONE TABLET BY MOUTH DAILY 90 Tab 3    furosemide (LASIX) 20 mg tablet TAKE ONE TABLET BY MOUTH DAILY 90 Tab 2    fluticasone (FLONASE) 50 mcg/actuation nasal spray Place 2 sprays in each nostril once daily. 1 Bottle 6    levocetirizine (XYZAL) 5 mg tablet TAKE ONE TABLET BY MOUTH DAILY 30 Tab 7    lisinopril (PRINIVIL, ZESTRIL) 10 mg tablet Take 1 Tab by mouth daily.  90 Tab 1    cyanocobalamin 1,000 mcg tablet Take 1,000 mcg by mouth daily.  cyclobenzaprine (FLEXERIL) 10 mg tablet Take 1 Tab by mouth three (3) times daily as needed for Muscle Spasm(s). 30 Tab 0    HYDROcodone-acetaminophen (NORCO) 7.5-325 mg per tablet Take 1 Tab by mouth every six (6) hours as needed for Pain. Max Daily Amount: 4 Tabs. 20 Tab 0    pravastatin (PRAVACHOL) 20 mg tablet TAKE ONE TABLET BY MOUTH EVERY NIGHT AT BEDTIME 90 tablet 1    MULTIVITAMIN PO Take 1 tablet by mouth daily.  omeprazole (PRILOSEC) 40 mg capsule TAKE ONE CAPSULE BY MOUTH EVERY DAY 90 capsule 2    cholecalciferol, VITAMIN D3, (VITAMIN D3) 5,000 unit tab tablet Take 5,000 Units by mouth daily.  aspirin 81 mg chewable tablet Take 81 mg by mouth daily.  CALCIUM CARB/VIT D3/MINERALS (CALTRATE PLUS PO) Take 1 Tab by mouth daily.          Allergies   Allergen Reactions    Ciprofloxacin Other (comments)     Insomnia and hot flashes    Demerol [Meperidine] Other (comments)     hallucinations    Doxycycline Unknown (comments)       \"Feeling flushed\"    Floxin [Ofloxacin] Other (comments)     insomnia       Past Medical History:   Diagnosis Date    C section 3/12/2010    Depression 3/12/2010    [facility]     Environmental allergies 3/12/2010    H/O: hysterectomy 3/12/2010    High cholesterol 3/12/2010    HTN (hypertension) 3/12/2010    Pneumonia 2017       Past Surgical History:   Procedure Laterality Date    ENDOSCOPY, COLON, DIAGNOSTIC      repeat 10 yrs (Dr. Ronald Austin)    HX BREAST BIOPSY  1980    both breast    HX BREAST BIOPSY  1986    left breast    HX  SECTION      x 3    HX CORNEAL TRANSPLANT  2011    left eye-     HX CYSTOSTOMY  3/2015    HX HYSTERECTOMY  1998       Family History   Problem Relation Age of Onset    Hypertension Mother     Other Mother      obesity, chf    Heart Disease Mother      CHF    Kidney Disease Mother     High Cholesterol Mother     Cancer Father      lung    Breast Cancer Sister    [place] MS Sister     High Cholesterol Sister     Hypertension Sister     Hypertension Brother     Liver Disease Brother      Hep C    High Cholesterol Brother     Hypertension Sister     Diabetes Sister     High Cholesterol Sister     Hypertension Sister     Other Sister      obese    High Cholesterol Sister        Social History   Substance Use Topics    Smoking status: Never Smoker    Smokeless tobacco: Never Used    Alcohol use 0.0 oz/week     0 Standard drinks or equivalent per week      Comment: occasional        ROS    Physical Exam   Constitutional: She appears well-developed and well-nourished. /82  Pulse 76  Temp 97.7 °F (36.5 °C) (Oral)   Resp 16  Ht 5' 6\" (1.676 m)  Wt 252 lb 12.8 oz (114.7 kg)  LMP 11/19/1998  SpO2 95%  BMI 40.8 kg/m2     HENT:   Right Ear: Tympanic membrane and ear canal normal.   Left Ear: Tympanic membrane and ear canal normal.   Nose: Mucosal edema and rhinorrhea present. Mouth/Throat: Mucous membranes are normal. No oropharyngeal exudate or posterior oropharyngeal erythema. Neck: Trachea normal, normal range of motion and full passive range of motion without pain. Neck supple. No edema present. No thyromegaly present. Cardiovascular: Normal rate and regular rhythm. Pulmonary/Chest: Effort normal and breath sounds normal. She has no decreased breath sounds. She has no wheezes. She has no rhonchi. She has no rales. Abdominal: Soft. Normal appearance and bowel sounds are normal. There is no tenderness. Lymphadenopathy:     She has no cervical adenopathy. Vitals reviewed. ASSESSMENT and PLAN  Ludmila Parisi was seen today for pneumonia. Diagnoses and all orders for this visit:    Allergic cough//Environmental allergies  -     Add predniSONE (DELTASONE) 10 mg tablet; Take 1 Tab by mouth two (2) times a day for 5 days.  -     Add promethazine-dextromethorphan (PROMETHAZINE-DM) 6.25-15 mg/5 mL syrup;  Take 5 mL by mouth every six (6) hours as needed for Cough. Continue with xyzal. Daily. Pneumonia due to infectious organism, unspecified laterality, unspecified part of lung  (chest xray report reviewed and I found that it was not read as an actual PNA)  Improved     Follow-up Disposition:   reviewed medications and side effects in detail    I have discussed diagnosis listed in this note with pt and/or family. I have discussed treatment plans and options and the risk/benefit analysis of those options, including safe use of medications and possible medication side effects. Through the use of shared decision making we have agreed to the above plan. The patient has received an after-visit summary and questions were answered concerning future plans and follow up. Advise pt of any urgent changes then to proceed to the ER.

## 2017-04-28 NOTE — MR AVS SNAPSHOT
Visit Information Date & Time Provider Department Dept. Phone Encounter #  
 4/28/2017 10:00 AM Canelo Coronel Walt [phone] [id number] Follow-up Instructions Return if symptoms worsen or fail to improve. Your Appointments 6/19/2017 11:00 AM  
ROUTINE CARE with Brooks Diaz MD  
[facility] [address]) Appt Note: 3m f/u  
 [address] [place]  [address] [phone] [address] [facility] [address] Upcoming Health Maintenance Date Due HEMOGLOBIN A1C Q6M 9/13/2017 LIPID PANEL Q1 3/13/2018 BREAST CANCER SCRN MAMMOGRAM 5/16/2018 PAP AKA CERVICAL CYTOLOGY 2/5/2019 COLONOSCOPY 4/2/2025 DTaP/Tdap/Td series (2 - Td) 12/2/2026 Allergies as of 4/28/2017  Review Complete On: 4/28/2017 By: Brooks Diaz MD  
  
 Severity Noted Reaction Type Reactions Ciprofloxacin  08/30/2016    Other (comments) Insomnia and hot flashes Demerol [Meperidine]  06/14/2010    Other (comments)  
 hallucinations Doxycycline  06/14/2010    Unknown (comments) \"Feeling flushed\" Floxin [Ofloxacin]  06/14/2010    Other (comments)  
 insomnia Current Immunizations  Reviewed on 4/28/2017 Name Date Influenza Vaccine 11/19/2014, 10/15/2013 Influenza Vaccine (Quad) PF 12/5/2016 Pneumococcal Polysaccharide (PPSV-23) 12/5/2016 Reviewed by Brooks Diaz MD on 4/28/2017 at 10:39 AM  
You Were Diagnosed With   
  
 Codes Comments Allergic cough    -  Primary ICD-10-CM: R62 ICD-9-CM: 786.2 Pneumonia due to infectious organism, unspecified laterality, unspecified part of lung     ICD-10-CM: J18.9 ICD-9-CM: 136.9, 484.8 Environmental allergies     ICD-10-CM: Z91.09 
ICD-9-CM: V15.09 Essential hypertension     ICD-10-CM: I10 
ICD-9-CM: 401.9 Mixed hyperlipidemia     ICD-10-CM: E78.2 ICD-9-CM: 272.2 Diet-controlled diabetes mellitus ([facility]     ICD-10-CM: E11.9 ICD-9-CM: 250.00 Encounter for medication monitoring     ICD-10-CM: Z51.81 
ICD-9-CM: V58.83 Vitals BP Pulse Temp Resp Height(growth percentile) Weight(growth percentile) 120/82 76 97.7 °F (36.5 °C) (Oral) 16 5' 6\" (1.676 m) 252 lb 12.8 oz (114.7 kg) LMP SpO2 BMI OB Status Smoking Status 11/19/1998 95% 40.8 kg/m2 Hysterectomy Never Smoker Vitals History BMI and BSA Data Body Mass Index Body Surface Area  
 40.8 kg/m 2 2.31 m 2 Preferred Pharmacy Pharmacy Name Phone [facility] [id number] [address], [address] [place] [address] [phone] Your Updated Medication List  
  
   
This list is accurate as of: 4/28/17 11:02 AM.  Always use your most recent med list.  
  
  
  
  
 albuterol 90 mcg/actuation inhaler Commonly known as:  PROVENTIL HFA, VENTOLIN HFA, PROAIR HFA Take 2 Puffs by inhalation every four (4) hours as needed for Wheezing. ALPRAZolam 0.5 mg tablet Commonly known as:  Bob Cellar Take 1 Tab by mouth nightly as needed. aspirin 81 mg chewable tablet Take 81 mg by mouth daily. CALTRATE PLUS PO Take 1 Tab by mouth daily. cholecalciferol (VITAMIN D3) 5,000 unit Tab tablet Commonly known as:  VITAMIN D3 Take 5,000 Units by mouth daily. citalopram 20 mg tablet Commonly known as:  CELEXA  
TAKE ONE TABLET BY MOUTH DAILY  
  
 cyanocobalamin 1,000 mcg tablet Take 1,000 mcg by mouth daily. cyclobenzaprine 10 mg tablet Commonly known as:  FLEXERIL Take 1 Tab by mouth three (3) times daily as needed for Muscle Spasm(s). fluticasone 50 mcg/actuation nasal spray Commonly known as:  [place]ique [place] [address] 2 sprays in each nostril once daily. furosemide 20 mg tablet Commonly known as:  LASIX TAKE ONE TABLET BY MOUTH DAILY HYDROcodone-acetaminophen 7.5-325 mg per tablet Commonly known as:  Ruy Phillip  
 Take 1 Tab by mouth every six (6) hours as needed for Pain. Max Daily Amount: 4 Tabs. levocetirizine 5 mg tablet Commonly known as:  Luara Lon TAKE ONE TABLET BY MOUTH DAILY  
  
 lisinopril 10 mg tablet Commonly known as:  [place] Take 1 Tab by mouth daily. MULTIVITAMIN PO Take 1 tablet by mouth daily. omeprazole 40 mg capsule Commonly known as:  PRILOSEC  
TAKE ONE CAPSULE BY MOUTH EVERY DAY  
  
 pravastatin 20 mg tablet Commonly known as:  PRAVACHOL  
TAKE ONE TABLET BY MOUTH EVERY NIGHT AT BEDTIME  
  
 predniSONE 10 mg tablet Commonly known as:  Laqueta Maw Take 1 Tab by mouth two (2) times a day for 5 days. PROBIOTIC PO Take 1 Tab by mouth daily. promethazine-dextromethorphan 6.25-15 mg/5 mL syrup Commonly known as:  PROMETHAZINE-DM Take 5 mL by mouth every six (6) hours as needed for Cough. Prescriptions Sent to Pharmacy Refills  
 predniSONE (DELTASONE) 10 mg tablet 0 Sig: Take 1 Tab by mouth two (2) times a day for 5 days. Class: Normal  
 Pharmacy: [address] Ph #: [phone] Route: Oral  
 promethazine-dextromethorphan (PROMETHAZINE-DM) 6.25-15 mg/5 mL syrup 1 Sig: Take 5 mL by mouth every six (6) hours as needed for Cough. Class: Normal  
 Pharmacy: [address] Ph #: [phone] Route: Oral  
  
Follow-up Instructions Return if symptoms worsen or fail to improve. [facility]! Dear Jarad Herron: 
Thank you for requesting a [employer] account. Our records indicate that you already have an active Fitmo account. You can access your account anytime at [email or web address] Did you know that you can access your hospital and ER discharge instructions at any time in Fitmo? You can also review all of your test results from your hospital stay or ER visit. Additional Information If you have questions, please visit the Frequently Asked Questions section of the [employer] website at [email or web address] Remember, Groupspeak is NOT to be used for urgent needs. For medical emergencies, dial 911. Now available from your iPhone and Android! Please provide this summary of care documentation to your next provider. Your primary care clinician is listed as [employer] [facility]. If you have any questions after today's visit, please call [phone].

## 2017-06-18 PROBLEM — I10 ESSENTIAL HYPERTENSION: Status: ACTIVE | Noted: 2017-06-18

## 2017-06-18 NOTE — PROGRESS NOTES
HISTORY OF PRESENT ILLNESS  Lexa Kate is a 60 y.o. female. HPI   Follow up on chronic medical problems. C/o increased stress with family illness. Has been having chest tightness that comes on with feeling stress. Sx are nonexertional.  No SOB. Last only for several seconds. Also thinks that lifting a bag of heavy clothes may have have strained muscles. Cardiovascular Review:  The patient has hypertension and hyperlipidemia. Diet and Lifestyle: generally follows a low fat low cholesterol diet, generally follows a low sodium diet, follows a diabetic diet regularly, exercises sporadically  Home BP Monitoring: is not measured at home. Pertinent ROS: taking medications as instructed, no medication side effects noted, no TIA's, no chest pain on exertion, no dyspnea on exertion, no swelling of ankles. DM type II follow up:  Diet controlled. Compliant w/ diabetic diet, and exercise. Has not been cheking BS recently. Denies any tingling sensation, polyuria and polydipsia. No blurred vision. No significant weight changes. Feeling well since last OV. Depression Review:  Patient is seen for followup of depression. Treatment includes Celexa. Ongoing symptoms include fatigue. She denies depressed mood, insomnia and hopelessness. She experiences the following side effects from the treatment: none.     Patient Active Problem List   Diagnosis Code    Environmental allergies Z91.09    Depression F32.9    C section     H/O: hysterectomy Z90.710    Hypovitaminosis D E55.9    Mixed hyperlipidemia E78.2    Benign essential hematuria R31.9    Diet-controlled diabetes mellitus ([facility] [address] E11.9    Hypertensive retinopathy H35.039    Encounter for medication monitoring Z51.81    Essential hypertension I10       Current Outpatient Prescriptions   Medication Sig Dispense Refill    promethazine-dextromethorphan (PROMETHAZINE-DM) 6.25-15 mg/5 mL syrup Take 5 mL by mouth every six (6) hours as needed for Cough. 180 mL 1    fluticasone (FLONASE) 50 mcg/actuation nasal spray Place 2 sprays in each nostril once daily. 1 Bottle 6    albuterol (PROVENTIL HFA, VENTOLIN HFA, PROAIR HFA) 90 mcg/actuation inhaler Take 2 Puffs by inhalation every four (4) hours as needed for Wheezing. 1 Inhaler 0    LACTOBACILLUS ACIDOPHILUS (PROBIOTIC PO) Take 1 Tab by mouth daily.  ALPRAZolam (XANAX) 0.5 mg tablet Take 1 Tab by mouth nightly as needed. 90 Tab 1    citalopram (CELEXA) 20 mg tablet TAKE ONE TABLET BY MOUTH DAILY 90 Tab 3    furosemide (LASIX) 20 mg tablet TAKE ONE TABLET BY MOUTH DAILY 90 Tab 2    levocetirizine (XYZAL) 5 mg tablet TAKE ONE TABLET BY MOUTH DAILY 30 Tab 7    lisinopril (PRINIVIL, ZESTRIL) 10 mg tablet Take 1 Tab by mouth daily. 90 Tab 1    cyanocobalamin 1,000 mcg tablet Take 1,000 mcg by mouth daily.  cyclobenzaprine (FLEXERIL) 10 mg tablet Take 1 Tab by mouth three (3) times daily as needed for Muscle Spasm(s). 30 Tab 0    HYDROcodone-acetaminophen (NORCO) 7.5-325 mg per tablet Take 1 Tab by mouth every six (6) hours as needed for Pain. Max Daily Amount: 4 Tabs. 20 Tab 0    pravastatin (PRAVACHOL) 20 mg tablet TAKE ONE TABLET BY MOUTH EVERY NIGHT AT BEDTIME 90 tablet 1    MULTIVITAMIN PO Take 1 tablet by mouth daily.  omeprazole (PRILOSEC) 40 mg capsule TAKE ONE CAPSULE BY MOUTH EVERY DAY 90 capsule 2    cholecalciferol, VITAMIN D3, (VITAMIN D3) 5,000 unit tab tablet Take 5,000 Units by mouth daily.  aspirin 81 mg chewable tablet Take 81 mg by mouth daily.  CALCIUM CARB/VIT D3/MINERALS (CALTRATE PLUS PO) Take 1 Tab by mouth daily.          Allergies   Allergen Reactions    Ciprofloxacin Other (comments)     Insomnia and hot flashes    Demerol [Meperidine] Other (comments)     hallucinations    Doxycycline Unknown (comments)       \"Feeling flushed\"    Floxin [Ofloxacin] Other (comments)     insomnia         Past Medical History:   Diagnosis Date    C section 3/12/2010  Depression 3/12/2010    Diabetes ([facility] [address]     Environmental allergies 3/12/2010    H/O: hysterectomy 3/12/2010    High cholesterol 3/12/2010    HTN (hypertension) 3/12/2010    Pneumonia 2017         Past Surgical History:   Procedure Laterality Date    ENDOSCOPY, COLON, DIAGNOSTIC      repeat 10 yrs (Dr. 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-     HX CYSTOSTOMY  3/2015    HX HYSTERECTOMY  1998         Family History   Problem Relation Age of Onset    Hypertension Mother     Other Mother      obesity, chf    Heart Disease Mother      CHF    Kidney Disease Mother     High Cholesterol Mother     Cancer Father      lung    Breast Cancer Sister     MS Sister     High Cholesterol Sister     Hypertension Sister     Hypertension Brother     Liver Disease Brother      Hep C    High Cholesterol Brother     Hypertension Sister     Diabetes Sister     High Cholesterol Sister     Hypertension Sister     Other Sister      obese    High Cholesterol Sister        Social History   Substance Use Topics    Smoking status: Never Smoker    Smokeless tobacco: Never Used    Alcohol use 0.0 oz/week     0 Standard drinks or equivalent per week      Comment: occasional        Lab Results   Component Value Date/Time    WBC 8.9 2017 12:32 PM    Hemoglobin (POC) 15.0 2016 05:26 PM    HGB 14.2 2017 12:32 PM    Hematocrit (POC) 44 2016 05:26 PM    HCT 42.7 2017 12:32 PM    PLATELET 528  12:32 PM    MCV 88 2017 12:32 PM       Lab Results   Component Value Date/Time    Cholesterol, total 199 2017 12:32 PM    HDL Cholesterol 39 2017 12:32 PM    LDL, calculated 130 2017 12:32 PM    Triglyceride 151 2017 12:32 PM    CHOL/HDL Ratio 4.9 2010 11:06 AM       Lab Results   Component Value Date/Time    TSH 1.430 03/22/2011 11:04 AM      Lab Results   Component Value Date/Time    Sodium 142 03/13/2017 12:32 PM    Potassium 4.6 03/13/2017 12:32 PM    Chloride 101 03/13/2017 12:32 PM    CO2 26 03/13/2017 12:32 PM    Anion gap 5 03/11/2015 01:35 PM    Glucose 99 03/13/2017 12:32 PM    BUN 13 03/13/2017 12:32 PM    Creatinine 0.76 03/13/2017 12:32 PM    BUN/Creatinine ratio 17 03/13/2017 12:32 PM    GFR est AA 97 03/13/2017 12:32 PM    GFR est non-AA 84 03/13/2017 12:32 PM    Calcium 9.6 03/13/2017 12:32 PM    Bilirubin, total 0.4 03/13/2017 12:32 PM    ALT (SGPT) 15 03/13/2017 12:32 PM    AST (SGOT) 14 03/13/2017 12:32 PM    Alk. phosphatase 68 03/13/2017 12:32 PM    Protein, total 6.8 03/13/2017 12:32 PM    Albumin 4.4 03/13/2017 12:32 PM    Globulin 3.5 03/11/2015 01:35 PM    A-G Ratio 1.8 03/13/2017 12:32 PM      Lab Results   Component Value Date/Time    Hemoglobin A1c 6.7 10/15/2013 12:55 PM    Hemoglobin A1c (POC) 6.0 03/13/2017 12:32 PM         Review of Systems   Constitutional: Negative for malaise/fatigue. HENT: Negative for congestion. Eyes: Negative for blurred vision. Respiratory: Negative for cough and shortness of breath. Cardiovascular: Negative for chest pain, palpitations and leg swelling. Gastrointestinal: Negative for abdominal pain, constipation and heartburn. Genitourinary: Negative for dysuria, frequency and urgency. Musculoskeletal: Negative for back pain and joint pain. Neurological: Negative for dizziness, tingling and headaches. Endo/Heme/Allergies: Negative for environmental allergies. Psychiatric/Behavioral: Negative for depression. The patient does not have insomnia. Physical Exam   Constitutional: She appears well-developed and well-nourished.      Visit Vitals    /86 (BP 1 Location: Left arm, BP Patient Position: Sitting)    Pulse 80    Temp 98 °F (36.7 °C) (Oral)    Resp 16    Ht 5' 6\" (1.676 m)    Wt 250 lb (113.4 kg)    LMP 11/19/1998    SpO2 97%    BMI 40.35 kg/m2     HENT:   Right Ear: Tympanic membrane and ear canal normal.   Left Ear: Tympanic membrane and ear canal normal.   Nose: No mucosal edema or rhinorrhea. Mouth/Throat: Oropharynx is clear and moist and mucous membranes are normal.   Neck: Normal range of motion. Neck supple. No thyromegaly present. Cardiovascular: Normal rate and regular rhythm. No murmur heard. Pulmonary/Chest: Effort normal and breath sounds normal.   Abdominal: Soft. Bowel sounds are normal. There is no tenderness. Musculoskeletal: Normal range of motion. She exhibits no edema. Lymphadenopathy:     She has no cervical adenopathy. Skin: Skin is warm and dry. Psychiatric: She has a normal mood and affect. Nursing note and vitals reviewed. ASSESSMENT and PLAN  Dimas Ugarte was seen today for hypertension and cholesterol problem. Diagnoses and all orders for this visit:    Essential hypertension  Discussed sodium restriction, high k rich diet, maintaining ideal body weight and regular exercise program such as daily walking 30 min perday 4-5 times per week, as physiologic means to achieve blood pressure control.  Medication compliance advised. -     losartan-hydroCHLOROthiazide (HYZAAR) 50-12.5 mg per tablet; Take 1 Tab by mouth daily. Diet-controlled diabetes mellitus (HCC)  -     AMB POC HEMOGLOBIN A1C  -     AMB POC GLUCOSE, QUANTITATIVE, BLOOD    Mixed hyperlipidemia  -     LIPID PANEL    Depression, unspecified depression type  Stable     Encounter for medication monitoring  -     METABOLIC PANEL, BASIC    Environmental allergies  -     fluticasone (FLONASE) 50 mcg/actuation nasal spray; Place 2 sprays in each nostril once daily. Feeling of chest tightness  -     AMB POC EKG ROUTINE W/ 12 LEADS, INTER & REP  -  WNL    Anxiety  Use xanax as needed.      Encounter for immunization  -     Tetanus, diphtheria toxoids and acellular pertussis (TDAP) vaccine, in individuals >=7 years, IM  -     DC IMMUNIZ ADMIN,1 SINGLE/COMB VAC/TOXOID        Follow-up Disposition:  Return in about 1 month (around 7/19/2017). reviewed diet, exercise and weight control  cardiovascular risk and specific lipid/LDL goals reviewed  reviewed medications and side effects in detail    I have discussed diagnosis listed in this note with pt and/or family. I have discussed treatment plans and options and the risk/benefit analysis of those options, including safe use of medications and possible medication side effects. Through the use of shared decision making we have agreed to the above plan. The patient has received an after-visit summary and questions were answered concerning future plans and follow up. Advise pt of any urgent changes then to proceed to the ER.

## 2017-06-19 ENCOUNTER — OFFICE VISIT (OUTPATIENT)
Dept: FAMILY MEDICINE CLINIC | Age: 62
End: 2017-06-19

## 2017-06-19 VITALS
HEIGHT: 66 IN | HEART RATE: 80 BPM | TEMPERATURE: 98 F | DIASTOLIC BLOOD PRESSURE: 86 MMHG | BODY MASS INDEX: 40.18 KG/M2 | RESPIRATION RATE: 16 BRPM | OXYGEN SATURATION: 97 % | SYSTOLIC BLOOD PRESSURE: 139 MMHG | WEIGHT: 250 LBS

## 2017-06-19 DIAGNOSIS — Z91.09 ENVIRONMENTAL ALLERGIES: ICD-10-CM

## 2017-06-19 DIAGNOSIS — E78.2 MIXED HYPERLIPIDEMIA: ICD-10-CM

## 2017-06-19 DIAGNOSIS — F41.9 ANXIETY: ICD-10-CM

## 2017-06-19 DIAGNOSIS — I10 ESSENTIAL HYPERTENSION: Primary | ICD-10-CM

## 2017-06-19 DIAGNOSIS — R07.89 FEELING OF CHEST TIGHTNESS: ICD-10-CM

## 2017-06-19 DIAGNOSIS — F32.A DEPRESSION, UNSPECIFIED DEPRESSION TYPE: ICD-10-CM

## 2017-06-19 DIAGNOSIS — E11.9 DIET-CONTROLLED DIABETES MELLITUS (HCC): ICD-10-CM

## 2017-06-19 DIAGNOSIS — Z51.81 ENCOUNTER FOR MEDICATION MONITORING: ICD-10-CM

## 2017-06-19 DIAGNOSIS — Z23 ENCOUNTER FOR IMMUNIZATION: ICD-10-CM

## 2017-06-19 LAB
GLUCOSE POC: 108 MG/DL
HBA1C MFR BLD HPLC: 5.9 %

## 2017-06-19 RX ORDER — HYDROCODONE POLISTIREX AND CHLORPHENIRAMINE POLISTIREX 10; 8 MG/5ML; MG/5ML
SUSPENSION, EXTENDED RELEASE ORAL
Refills: 0 | COMMUNITY
Start: 2017-04-03 | End: 2017-06-19

## 2017-06-19 RX ORDER — AZITHROMYCIN 250 MG/1
TABLET, FILM COATED ORAL
Refills: 0 | COMMUNITY
Start: 2017-04-03 | End: 2017-06-19 | Stop reason: ALTCHOICE

## 2017-06-19 RX ORDER — DEXTROMETHORPHAN HYDROBROMIDE, GUAIFENESIN 5; 100 MG/5ML; MG/5ML
1300 LIQUID ORAL
COMMUNITY

## 2017-06-19 RX ORDER — FLUTICASONE PROPIONATE 50 MCG
SPRAY, SUSPENSION (ML) NASAL
Qty: 1 BOTTLE | Refills: 11 | Status: SHIPPED | OUTPATIENT
Start: 2017-06-19 | End: 2018-07-13 | Stop reason: SDUPTHER

## 2017-06-19 RX ORDER — LOSARTAN POTASSIUM AND HYDROCHLOROTHIAZIDE 12.5; 5 MG/1; MG/1
1 TABLET ORAL DAILY
Qty: 30 TAB | Refills: 3 | Status: SHIPPED | OUTPATIENT
Start: 2017-06-19 | End: 2017-07-21 | Stop reason: SDUPTHER

## 2017-06-19 NOTE — MR AVS SNAPSHOT
Visit Information Date & Time Provider Department Dept. Phone Encounter #  
 6/19/2017 11:00 AM Areatha Mcardle, MD [facility] [phone] [id number] Follow-up Instructions Return in about 1 month (around 7/19/2017). Upcoming Health Maintenance Date Due INFLUENZA AGE 9 TO ADULT 8/1/2017 HEMOGLOBIN A1C Q6M 9/13/2017 LIPID PANEL Q1 3/13/2018 BREAST CANCER SCRN MAMMOGRAM 5/16/2018 PAP AKA CERVICAL CYTOLOGY 2/5/2019 COLONOSCOPY 4/2/2025 DTaP/Tdap/Td series (2 - Td) 12/2/2026 Allergies as of 6/19/2017  Review Complete On: 6/19/2017 By: Airam Flores LPN Severity Noted Reaction Type Reactions Ciprofloxacin  08/30/2016    Other (comments) Insomnia and hot flashes Demerol [Meperidine]  06/14/2010    Other (comments)  
 hallucinations Doxycycline  06/14/2010    Unknown (comments) \"Feeling flushed\" Floxin [Ofloxacin]  06/14/2010    Other (comments)  
 insomnia Current Immunizations  Reviewed on 6/19/2017 Name Date Influenza Vaccine 11/19/2014, 10/15/2013 Influenza Vaccine (Quad) PF 12/5/2016 Pneumococcal Polysaccharide (PPSV-23) 12/5/2016 Reviewed by Areatha Mcardle, MD on 6/19/2017 at 11:43 AM  
You Were Diagnosed With   
  
 Codes Comments Essential hypertension    -  Primary ICD-10-CM: I10 
ICD-9-CM: 401.9 Diet-controlled diabetes mellitus ([facility]     ICD-10-CM: E11.9 ICD-9-CM: 250.00 Mixed hyperlipidemia     ICD-10-CM: E78.2 ICD-9-CM: 272.2 Depression, unspecified depression type     ICD-10-CM: F32.9 ICD-9-CM: 123 Encounter for medication monitoring     ICD-10-CM: Z51.81 
ICD-9-CM: V58.83 Environmental allergies     ICD-10-CM: Z91.09 
ICD-9-CM: V15.09 Feeling of chest tightness     ICD-10-CM: R07.89 ICD-9-CM: 786.59 Anxiety     ICD-10-CM: F41.9 ICD-9-CM: 300.00 Vitals BP Pulse Temp Resp Height(growth percentile) Weight(growth percentile) 139/86 (BP 1 Location: Left arm, BP Patient Position: Sitting) 80 98 °F (36.7 °C) (Oral) 16 5' 6\" (1.676 m) 250 lb (113.4 kg) LMP SpO2 BMI OB Status Smoking Status 11/19/1998 97% 40.35 kg/m2 Hysterectomy Never Smoker Vitals History BMI and BSA Data Body Mass Index Body Surface Area  
 40.35 kg/m 2 2.3 m 2 Preferred Pharmacy Pharmacy Name Phone [facility] [place] [id number] [facility], [address] [place] [address] [phone] Your Updated Medication List  
  
   
This list is accurate as of: 6/19/17 12:18 PM.  Always use your most recent med list.  
  
  
  
  
 albuterol 90 mcg/actuation inhaler Commonly known as:  PROVENTIL HFA, VENTOLIN HFA, PROAIR HFA Take 2 Puffs by inhalation every four (4) hours as needed for Wheezing. ALPRAZolam 0.5 mg tablet Commonly known as:  Cecilia Henderson Take 1 Tab by mouth nightly as needed. aspirin 81 mg chewable tablet Take 81 mg by mouth daily. CALTRATE PLUS PO Take 1 Tab by mouth daily. cholecalciferol (VITAMIN D3) 5,000 unit Tab tablet Commonly known as:  VITAMIN D3 Take 5,000 Units by mouth daily. citalopram 20 mg tablet Commonly known as:  CELEXA  
TAKE ONE TABLET BY MOUTH DAILY  
  
 cyanocobalamin 1,000 mcg tablet Take 1,000 mcg by mouth daily. cyclobenzaprine 10 mg tablet Commonly known as:  FLEXERIL Take 1 Tab by mouth three (3) times daily as needed for Muscle Spasm(s). fluticasone 50 mcg/actuation nasal spray Commonly known as:  Clemente Bumpers Place 2 sprays in each nostril once daily. furosemide 20 mg tablet Commonly known as:  LASIX TAKE ONE TABLET BY MOUTH DAILY  
  
 levocetirizine 5 mg tablet Commonly known as:  Viola Rife TAKE ONE TABLET BY MOUTH DAILY losartan-hydroCHLOROthiazide 50-12.5 mg per tablet Commonly known as:  HYZAAR Take 1 Tab by mouth daily. MULTIVITAMIN PO Take 1 tablet by mouth daily. omeprazole 40 mg capsule Commonly known as:  PRILOSEC  
TAKE ONE CAPSULE BY MOUTH EVERY DAY  
  
 pravastatin 20 mg tablet Commonly known as:  PRAVACHOL  
TAKE ONE TABLET BY MOUTH EVERY NIGHT AT BEDTIME  
  
 PROBIOTIC PO Take 1 Tab by mouth daily. promethazine-dextromethorphan 6.25-15 mg/5 mL syrup Commonly known as:  PROMETHAZINE-DM Take 5 mL by mouth every six (6) hours as needed for Cough. TYLENOL ARTHRITIS PAIN 650 mg CR tablet Generic drug:  acetaminophen Take 1,300 mg by mouth every six (6) hours as needed for Pain. Prescriptions Sent to Pharmacy Refills  
 fluticasone (FLONASE) 50 mcg/actuation nasal spray 11 Sig: Place 2 sprays in each nostril once daily. Class: Normal  
 Pharmacy: [facility] [address] Ph #: [phone]  
 losartan-hydroCHLOROthiazide (HYZAAR) 50-12.5 mg per tablet 3 Sig: Take 1 Tab by mouth daily. Class: Normal  
 Pharmacy: [facility] [address] Ph #: [phone] Route: Oral  
  
We Performed the Following AMB POC EKG ROUTINE W/ 12 LEADS, INTER & REP [11146 CPT(R)] AMB POC GLUCOSE, QUANTITATIVE, BLOOD [22049 CPT(R)] AMB POC HEMOGLOBIN A1C [90944 CPT(R)] LIPID PANEL [85295 CPT(R)] METABOLIC PANEL, BASIC [96689 CPT(R)] Follow-up Instructions Return in about 1 month (around 7/19/2017). Introducing [facility]! Dear Zulma Montoya: 
Thank you for requesting a Zing account. Our records indicate that you already have an active Zing account. You can access your account anytime at [email or web address] Did you know that you can access your hospital and ER discharge instructions at any time in Zing? You can also review all of your test results from your hospital stay or ER visit. Additional Information If you have questions, please visit the Frequently Asked Questions section of the [employer] website at [email or web address] Remember, [employer] is NOT to be used for urgent needs. For medical emergencies, dial 911. Now available from your iPhone and Android! Please provide this summary of care documentation to your next provider. Your primary care clinician is listed as Raul Bethea. If you have any questions after today's visit, please call [phone].

## 2017-06-19 NOTE — PROGRESS NOTES
Chief Complaint   Patient presents with    Hypertension     3 month follow up    Cholesterol Problem     3 month follow up         CMP 3/13/2017    A1C 3/13/2017    Lipid  3/13/2017    Mammogram 5/16/2016    Colonoscopy 4/2/2015 by Dr. Arnaldo Murray- repeat in 10 years     Pt states last eye exam was 4/2017 by Dr. Angel Dallas. Verbal order received per Dr. Tony Arauz. Pt received TDap IM in left deltoid without any difficulty.

## 2017-06-20 LAB
BUN SERPL-MCNC: 8 MG/DL (ref 8–27)
BUN/CREAT SERPL: 11 (ref 12–28)
CALCIUM SERPL-MCNC: 9.5 MG/DL (ref 8.7–10.3)
CHLORIDE SERPL-SCNC: 101 MMOL/L (ref 96–106)
CHOLEST SERPL-MCNC: 202 MG/DL (ref 100–199)
CO2 SERPL-SCNC: 26 MMOL/L (ref 18–29)
CREAT SERPL-MCNC: 0.7 MG/DL (ref 0.57–1)
GLUCOSE SERPL-MCNC: 100 MG/DL (ref 65–99)
HDLC SERPL-MCNC: 41 MG/DL
INTERPRETATION, 910389: NORMAL
LDLC SERPL CALC-MCNC: 132 MG/DL (ref 0–99)
POTASSIUM SERPL-SCNC: 4.5 MMOL/L (ref 3.5–5.2)
SODIUM SERPL-SCNC: 144 MMOL/L (ref 134–144)
TRIGL SERPL-MCNC: 145 MG/DL (ref 0–149)
VLDLC SERPL CALC-MCNC: 29 MG/DL (ref 5–40)

## 2017-06-28 DIAGNOSIS — I10 ESSENTIAL HYPERTENSION: ICD-10-CM

## 2017-06-28 RX ORDER — LISINOPRIL 10 MG/1
TABLET ORAL
Qty: 90 TAB | Refills: 3 | Status: SHIPPED | OUTPATIENT
Start: 2017-06-28 | End: 2017-08-11

## 2017-07-21 RX ORDER — LOSARTAN POTASSIUM AND HYDROCHLOROTHIAZIDE 12.5; 5 MG/1; MG/1
1 TABLET ORAL DAILY
Qty: 90 TAB | Refills: 3 | Status: SHIPPED | OUTPATIENT
Start: 2017-07-21 | End: 2018-08-17 | Stop reason: SDUPTHER

## 2017-08-05 DIAGNOSIS — K21.9 GERD (GASTROESOPHAGEAL REFLUX DISEASE): ICD-10-CM

## 2017-08-07 RX ORDER — OMEPRAZOLE 40 MG/1
CAPSULE, DELAYED RELEASE ORAL
Qty: 90 CAP | Refills: 3 | Status: SHIPPED | OUTPATIENT
Start: 2017-08-07 | End: 2018-05-15

## 2017-08-09 ENCOUNTER — OFFICE VISIT (OUTPATIENT)
Dept: FAMILY MEDICINE CLINIC | Age: 62
End: 2017-08-09

## 2017-08-09 NOTE — PROGRESS NOTES
HISTORY OF PRESENT ILLNESS  Jana Diaz is a 60 y.o. female. HPI   Follow up on chronic medical problems. Follow up on BP. Told by her eye doctor that she has hypertensive retinopathy and needed to be sure that her BP is well controlled. Cardiovascular Review:  The patient has hypertension and hyperlipidemia. Diet and Lifestyle: generally follows a low fat low cholesterol diet, generally follows a low sodium diet, follows a diabetic diet regularly, exercises sporadically  Home BP Monitoring: is not measured at home. Pertinent ROS: taking medications as instructed, no medication side effects noted, no TIA's, no chest pain on exertion, no dyspnea on exertion, no swelling of ankles. C/o loud snoring and suspects that she has sleep apnea. DM type II follow up:  Diet controlled. Compliant w/ diabetic diet, and exercise. Has not been cheking BS recently. Denies any tingling sensation, polyuria and polydipsia. No blurred vision. No significant weight changes. Feeling well since last OV. Depression Review:  Patient is seen for followup of depression. Treatment includes Celexa. Ongoing symptoms include fatigue. She denies depressed mood, insomnia and hopelessness. She experiences the following side effects from the treatment: none. Patient Active Problem List   Diagnosis Code    Environmental allergies Z91.09    Depression F32.9    C section     H/O: hysterectomy Z90.710    Hypovitaminosis D E55.9    Mixed hyperlipidemia E78.2    Benign essential hematuria R31.9    Diet-controlled diabetes mellitus ([facility] [address] E11.9    Hypertensive retinopathy H35.039    Encounter for medication monitoring Z51.81    Essential hypertension I10       Current Outpatient Prescriptions   Medication Sig Dispense Refill    omeprazole (PRILOSEC) 40 mg capsule TAKE ONE CAPSULE BY MOUTH DAILY 90 Cap 3    losartan-hydroCHLOROthiazide (HYZAAR) 50-12.5 mg per tablet Take 1 Tab by mouth daily.  90 Tab 3    lisinopril (PRINIVIL, ZESTRIL) 10 mg tablet TAKE ONE TABLET BY MOUTH DAILY 90 Tab 3    acetaminophen (TYLENOL ARTHRITIS PAIN) 650 mg CR tablet Take 1,300 mg by mouth every six (6) hours as needed for Pain.  fluticasone (FLONASE) 50 mcg/actuation nasal spray Place 2 sprays in each nostril once daily. 1 Bottle 11    promethazine-dextromethorphan (PROMETHAZINE-DM) 6.25-15 mg/5 mL syrup Take 5 mL by mouth every six (6) hours as needed for Cough. 180 mL 1    albuterol (PROVENTIL HFA, VENTOLIN HFA, PROAIR HFA) 90 mcg/actuation inhaler Take 2 Puffs by inhalation every four (4) hours as needed for Wheezing. 1 Inhaler 0    LACTOBACILLUS ACIDOPHILUS (PROBIOTIC PO) Take 1 Tab by mouth daily.  ALPRAZolam (XANAX) 0.5 mg tablet Take 1 Tab by mouth nightly as needed. 90 Tab 1    citalopram (CELEXA) 20 mg tablet TAKE ONE TABLET BY MOUTH DAILY 90 Tab 3    furosemide (LASIX) 20 mg tablet TAKE ONE TABLET BY MOUTH DAILY 90 Tab 2    levocetirizine (XYZAL) 5 mg tablet TAKE ONE TABLET BY MOUTH DAILY 30 Tab 7    cyanocobalamin 1,000 mcg tablet Take 1,000 mcg by mouth daily.  cyclobenzaprine (FLEXERIL) 10 mg tablet Take 1 Tab by mouth three (3) times daily as needed for Muscle Spasm(s). 30 Tab 0    pravastatin (PRAVACHOL) 20 mg tablet TAKE ONE TABLET BY MOUTH EVERY NIGHT AT BEDTIME 90 tablet 1    MULTIVITAMIN PO Take 1 tablet by mouth daily.  cholecalciferol, VITAMIN D3, (VITAMIN D3) 5,000 unit tab tablet Take 5,000 Units by mouth daily.  aspirin 81 mg chewable tablet Take 81 mg by mouth daily.  CALCIUM CARB/VIT D3/MINERALS (CALTRATE PLUS PO) Take 1 Tab by mouth daily.          Allergies   Allergen Reactions    Ciprofloxacin Other (comments)     Insomnia and hot flashes    Demerol [Meperidine] Other (comments)     hallucinations    Doxycycline Unknown (comments)       \"Feeling flushed\"    Floxin [Ofloxacin] Other (comments)     insomnia         Past Medical History:   Diagnosis Date    C section 3/12/2010  Depression 3/12/2010    Diabetes ([facility] [address]     Environmental allergies 3/12/2010    H/O: hysterectomy 3/12/2010    High cholesterol 3/12/2010    HTN (hypertension) 3/12/2010    Pneumonia 2017         Past Surgical History:   Procedure Laterality Date    ENDOSCOPY, COLON, DIAGNOSTIC  2006    repeat 10 yrs (Dr. 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-     HX CYSTOSTOMY  3/2015    HX HYSTERECTOMY  1998         Family History   Problem Relation Age of Onset    Hypertension Mother     Other Mother      obesity, chf    Heart Disease Mother      CHF    Kidney Disease Mother     High Cholesterol Mother     Cancer Father      lung    Breast Cancer Sister     MS Sister     High Cholesterol Sister     Hypertension Sister     Hypertension Brother     Liver Disease Brother      Hep C    High Cholesterol Brother     Hypertension Sister     Diabetes Sister     High Cholesterol Sister     Hypertension Sister     Other Sister      obese    High Cholesterol Sister        Social History   Substance Use Topics    Smoking status: Never Smoker    Smokeless tobacco: Never Used    Alcohol use 0.0 oz/week     0 Standard drinks or equivalent per week      Comment: occasional        Lab Results   Component Value Date/Time    WBC 8.9 2017 12:32 PM    Hemoglobin (POC) 15.0 2016 05:26 PM    HGB 14.2 2017 12:32 PM    Hematocrit (POC) 44 2016 05:26 PM    HCT 42.7 2017 12:32 PM    PLATELET 891  12:32 PM    MCV 88 2017 12:32 PM       Lab Results   Component Value Date/Time    Cholesterol, total 202 2017 12:10 PM    HDL Cholesterol 41 2017 12:10 PM    LDL, calculated 132 2017 12:10 PM    Triglyceride 145 2017 12:10 PM    CHOL/HDL Ratio 4.9 2010 11:06 AM       Lab Results   Component Value Date/Time    TSH 1.430 03/22/2011 11:04 AM      Lab Results   Component Value Date/Time    Sodium 144 06/19/2017 12:10 PM    Potassium 4.5 06/19/2017 12:10 PM    Chloride 101 06/19/2017 12:10 PM    CO2 26 06/19/2017 12:10 PM    Anion gap 5 03/11/2015 01:35 PM    Glucose 100 06/19/2017 12:10 PM    BUN 8 06/19/2017 12:10 PM    Creatinine 0.70 06/19/2017 12:10 PM    BUN/Creatinine ratio 11 06/19/2017 12:10 PM    GFR est  06/19/2017 12:10 PM    GFR est non-AA 93 06/19/2017 12:10 PM    Calcium 9.5 06/19/2017 12:10 PM    Bilirubin, total 0.4 03/13/2017 12:32 PM    ALT (SGPT) 15 03/13/2017 12:32 PM    AST (SGOT) 14 03/13/2017 12:32 PM    Alk. phosphatase 68 03/13/2017 12:32 PM    Protein, total 6.8 03/13/2017 12:32 PM    Albumin 4.4 03/13/2017 12:32 PM    Globulin 3.5 03/11/2015 01:35 PM    A-G Ratio 1.8 03/13/2017 12:32 PM      Lab Results   Component Value Date/Time    Hemoglobin A1c 6.7 10/15/2013 12:55 PM    Hemoglobin A1c (POC) 5.9 06/19/2017 12:10 PM         Review of Systems   Constitutional: Negative for malaise/fatigue. HENT: Negative for congestion. Eyes: Negative for blurred vision. Respiratory: Negative for cough and shortness of breath. Cardiovascular: Negative for chest pain, palpitations and leg swelling. Gastrointestinal: Negative for abdominal pain, constipation and heartburn. Genitourinary: Negative for dysuria, frequency and urgency. Musculoskeletal: Negative for back pain and joint pain. Neurological: Negative for dizziness, tingling and headaches. Endo/Heme/Allergies: Negative for environmental allergies. Psychiatric/Behavioral: Negative for depression. The patient does not have insomnia. Physical Exam   Constitutional: She appears well-developed and well-nourished.    /88 (BP 1 Location: Left arm, BP Patient Position: Sitting)  Pulse 73  Temp 97.7 °F (36.5 °C) (Oral)   Resp 16  Ht 5' 6\" (1.676 m)  Wt 252 lb 3.2 oz (114.4 kg)  LMP 11/19/1998  SpO2 96%  BMI 40.71 kg/m2     HENT:   Right Ear: Tympanic membrane and ear canal normal.   Left Ear: Tympanic membrane and ear canal normal.   Nose: No mucosal edema or rhinorrhea. Mouth/Throat: Oropharynx is clear and moist and mucous membranes are normal.   Neck: Normal range of motion. Neck supple. No thyromegaly present. Cardiovascular: Normal rate and regular rhythm. No murmur heard. Pulmonary/Chest: Effort normal and breath sounds normal.   Abdominal: Soft. Bowel sounds are normal. There is no tenderness. Musculoskeletal: Normal range of motion. She exhibits no edema. Lymphadenopathy:     She has no cervical adenopathy. Skin: Skin is warm and dry. Psychiatric: She has a normal mood and affect. Nursing note and vitals reviewed. ASSESSMENT and PLAN  Parag Cuellar was seen today for hypertension. Diagnoses and all orders for this visit:    Essential hypertension  -    Add lisinopril (PRINIVIL, ZESTRIL) 5 mg tablet; Take 1 Tab by mouth daily. Discussed sodium restriction, high k rich diet, maintaining ideal body weight and regular exercise program such as daily walking 30 min perday 4-5 times per week, as physiologic means to achieve blood pressure control.  Medication compliance advised. Diet-controlled diabetes mellitus (HCC)  -     AMB POC HEMOGLOBIN A1C  -     AMB POC GLUCOSE, QUANTITATIVE, BLOOD  -     MICROALBUMIN, UR, RAND W/ MICROALBUMIN/CREA RATIO  -     AMB POC URINALYSIS DIP STICK AUTO W/O MICRO    Mixed hyperlipidemia  Continue to monitor. Work on diet and exercise. Depression, unspecified depression type  Stable     Encounter for medication monitoring  -     METABOLIC PANEL, COMPREHENSIVE    Hypertensive retinopathy, left  As per specialists.   She has not have extremely high BPs in the past.        Follow-up Disposition: Not on File  reviewed diet, exercise and weight control  cardiovascular risk and specific lipid/LDL goals reviewed  reviewed medications and side effects in detail  specific diabetic recommendations: low cholesterol diet, weight control and daily exercise discussed and glycohemoglobin and other lab monitoring discussed     I have discussed diagnosis listed in this note with pt and/or family. I have discussed treatment plans and options and the risk/benefit analysis of those options, including safe use of medications and possible medication side effects. Through the use of shared decision making we have agreed to the above plan. The patient has received an after-visit summary and questions were answered concerning future plans and follow up. Advise pt of any urgent changes then to proceed to the ER.

## 2017-08-09 NOTE — MR AVS SNAPSHOT
Visit Information Date & Time Provider Department Dept. Phone Encounter #  
 8/9/2017  9:00 AM Diane CassidyCanelo [phone] [id number] Your Appointments 1/15/2018  9:15 AM  
COMPLETE PHYSICAL with Diane Cassidy MD  
[facility] [address]) Appt Note: cpe  
 [address] [facility] [address]  
[phone] [id number] Upcoming Health Maintenance Date Due INFLUENZA AGE 9 TO ADULT 8/1/2017 HEMOGLOBIN A1C Q6M 12/19/2017 BREAST CANCER SCRN MAMMOGRAM 5/16/2018 LIPID PANEL Q1 6/19/2018 PAP AKA CERVICAL CYTOLOGY 2/5/2019 COLONOSCOPY 4/2/2025 DTaP/Tdap/Td series (2 - Td) 6/19/2027 Allergies as of 8/9/2017  Review Complete On: 6/19/2017 By: Diane Cassidy MD  
  
 Severity Noted Reaction Type Reactions Ciprofloxacin  08/30/2016    Other (comments) Insomnia and hot flashes Demerol [Meperidine]  06/14/2010    Other (comments)  
 hallucinations Doxycycline  06/14/2010    Unknown (comments) \"Feeling flushed\" Floxin [Ofloxacin]  06/14/2010    Other (comments)  
 insomnia Current Immunizations  Reviewed on 8/11/2017 Name Date Influenza Vaccine 11/19/2014, 10/15/2013 Influenza Vaccine (Quad) PF 12/5/2016 Pneumococcal Polysaccharide (PPSV-23) 12/5/2016 Tdap 6/19/2017 Not reviewed this visit Vitals LMP OB Status Smoking Status 11/19/1998 Hysterectomy Never Smoker Preferred Pharmacy Pharmacy Name Phone [facility] [id number] [address] [phone] Your Updated Medication List  
  
   
This list is accurate as of: 8/9/17 11:59 PM.  Always use your most recent med list.  
  
  
  
  
 albuterol 90 mcg/actuation inhaler Commonly known as:  PROVENTIL HFA, VENTOLIN HFA, PROAIR HFA  
 Take 2 Puffs by inhalation every four (4) hours as needed for Wheezing. ALPRAZolam 0.5 mg tablet Commonly known as:  [place] Take 1 Tab by mouth nightly as needed. aspirin 81 mg chewable tablet Take 81 mg by mouth daily. CALTRATE PLUS PO Take 1 Tab by mouth daily. cholecalciferol (VITAMIN D3) 5,000 unit Tab tablet Commonly known as:  VITAMIN D3 Take 5,000 Units by mouth daily. citalopram 20 mg tablet Commonly known as:  CELEXA  
TAKE ONE TABLET BY MOUTH DAILY  
  
 cyanocobalamin 1,000 mcg tablet Take 1,000 mcg by mouth daily. cyclobenzaprine 10 mg tablet Commonly known as:  FLEXERIL Take 1 Tab by mouth three (3) times daily as needed for Muscle Spasm(s). fluticasone 50 mcg/actuation nasal spray Commonly known as:  Star [employer] Place 2 sprays in each nostril once daily. furosemide 20 mg tablet Commonly known as:  LASIX TAKE ONE TABLET BY MOUTH DAILY  
  
 levocetirizine 5 mg tablet Commonly known as:  Stefan Ambrosia TAKE ONE TABLET BY MOUTH DAILY  
  
 lisinopril 10 mg tablet Commonly known as:  PRINIVIL, ZESTRIL  
TAKE ONE TABLET BY MOUTH DAILY losartan-hydroCHLOROthiazide 50-12.5 mg per tablet Commonly known as:  HYZAAR Take 1 Tab by mouth daily. MULTIVITAMIN PO Take 1 tablet by mouth daily. omeprazole 40 mg capsule Commonly known as:  PRILOSEC  
TAKE ONE CAPSULE BY MOUTH DAILY pravastatin 20 mg tablet Commonly known as:  PRAVACHOL  
TAKE ONE TABLET BY MOUTH EVERY NIGHT AT BEDTIME  
  
 PROBIOTIC PO Take 1 Tab by mouth daily. promethazine-dextromethorphan 6.25-15 mg/5 mL syrup Commonly known as:  PROMETHAZINE-DM Take 5 mL by mouth every six (6) hours as needed for Cough. TYLENOL ARTHRITIS PAIN 650 mg CR tablet Generic drug:  acetaminophen Take 1,300 mg by mouth every six (6) hours as needed for Pain. Introducing [facility]!    
 Dear Tricia Ser: 
 Thank you for requesting a [facility] account. Our records indicate that you already have an active [facility] account. You can access your account anytime at [email or web address] Did you know that you can access your hospital and ER discharge instructions at any time in [facility]? You can also review all of your test results from your hospital stay or ER visit. Additional Information If you have questions, please visit the Frequently Asked Questions section of the [facility] website at [email or web address] Remember, [facility] is NOT to be used for urgent needs. For medical emergencies, dial 911. Now available from your iPhone and Android! Please provide this summary of care documentation to your next provider. Your primary care clinician is listed as Renee Candelaria. If you have any questions after today's visit, please call [phone].

## 2017-08-11 ENCOUNTER — OFFICE VISIT (OUTPATIENT)
Dept: FAMILY MEDICINE CLINIC | Age: 62
End: 2017-08-11

## 2017-08-11 VITALS
DIASTOLIC BLOOD PRESSURE: 70 MMHG | HEIGHT: 66 IN | HEART RATE: 79 BPM | RESPIRATION RATE: 18 BRPM | SYSTOLIC BLOOD PRESSURE: 125 MMHG | WEIGHT: 251.4 LBS | BODY MASS INDEX: 40.4 KG/M2 | TEMPERATURE: 98.1 F | OXYGEN SATURATION: 96 %

## 2017-08-11 DIAGNOSIS — I10 ESSENTIAL HYPERTENSION: Primary | ICD-10-CM

## 2017-08-11 DIAGNOSIS — F32.A DEPRESSION, UNSPECIFIED DEPRESSION TYPE: ICD-10-CM

## 2017-08-11 DIAGNOSIS — E78.2 MIXED HYPERLIPIDEMIA: ICD-10-CM

## 2017-08-11 DIAGNOSIS — Z63.79 STRESS DUE TO ILLNESS OF FAMILY MEMBER: ICD-10-CM

## 2017-08-11 DIAGNOSIS — E11.9 DIET-CONTROLLED DIABETES MELLITUS (HCC): ICD-10-CM

## 2017-08-11 DIAGNOSIS — Z12.31 ENCOUNTER FOR SCREENING MAMMOGRAM FOR BREAST CANCER: ICD-10-CM

## 2017-08-11 DIAGNOSIS — Z13.820 SCREENING FOR OSTEOPOROSIS: ICD-10-CM

## 2017-08-11 DIAGNOSIS — Z51.81 ENCOUNTER FOR MEDICATION MONITORING: ICD-10-CM

## 2017-08-11 NOTE — PROGRESS NOTES
HISTORY OF PRESENT ILLNESS  Veronica Sharma is a 60 y.o. female. HPI   Follow up on chronic medical problems. C/o increased stress with family illness. Mother is in nursing home and brother is in hospice. Cardiovascular Review:  The patient has hypertension and hyperlipidemia. Cough is improved off of the lisinopril. Doing well with Hyzaar. Diet and Lifestyle: generally follows a low fat low cholesterol diet, generally follows a low sodium diet, follows a diabetic diet regularly, exercises sporadically  Home BP Monitoring: is not measured at home. Pertinent ROS: taking medications as instructed, no medication side effects noted, no TIA's, no chest pain on exertion, no dyspnea on exertion, no swelling of ankles. DM type II follow up:  Diet controlled. Compliant w/ diabetic diet, and exercise. Has not been cheking BS recently. Denies any tingling sensation, polyuria and polydipsia. No blurred vision. No significant weight changes. Feeling well since last OV. Depression Review:  Patient is seen for followup of depression. Treatment includes Celexa. Has been using xanax to help with sleep. Ongoing symptoms include fatigue. She denies depressed mood, insomnia and hopelessness. She experiences the following side effects from the treatment: none. Patient Active Problem List   Diagnosis Code    Environmental allergies Z91.09    Depression F32.9    C section     H/O: hysterectomy Z90.710    Hypovitaminosis D E55.9    Mixed hyperlipidemia E78.2    Benign essential hematuria R31.9    Diet-controlled diabetes mellitus ([address] [address] E11.9    Hypertensive retinopathy H35.039    Encounter for medication monitoring Z51.81    Essential hypertension I10       Current Outpatient Prescriptions   Medication Sig Dispense Refill    promethazine-dextromethorphan (PROMETHAZINE-DM) 6.25-15 mg/5 mL syrup Take 5 mL by mouth every six (6) hours as needed for Cough.  180 mL 1    fluticasone (FLONASE) 50 mcg/actuation nasal spray Place 2 sprays in each nostril once daily. 1 Bottle 6    albuterol (PROVENTIL HFA, VENTOLIN HFA, PROAIR HFA) 90 mcg/actuation inhaler Take 2 Puffs by inhalation every four (4) hours as needed for Wheezing. 1 Inhaler 0    LACTOBACILLUS ACIDOPHILUS (PROBIOTIC PO) Take 1 Tab by mouth daily.  ALPRAZolam (XANAX) 0.5 mg tablet Take 1 Tab by mouth nightly as needed. 90 Tab 1    citalopram (CELEXA) 20 mg tablet TAKE ONE TABLET BY MOUTH DAILY 90 Tab 3    furosemide (LASIX) 20 mg tablet TAKE ONE TABLET BY MOUTH DAILY 90 Tab 2    levocetirizine (XYZAL) 5 mg tablet TAKE ONE TABLET BY MOUTH DAILY 30 Tab 7    lisinopril (PRINIVIL, ZESTRIL) 10 mg tablet Take 1 Tab by mouth daily. 90 Tab 1    cyanocobalamin 1,000 mcg tablet Take 1,000 mcg by mouth daily.  cyclobenzaprine (FLEXERIL) 10 mg tablet Take 1 Tab by mouth three (3) times daily as needed for Muscle Spasm(s). 30 Tab 0    HYDROcodone-acetaminophen (NORCO) 7.5-325 mg per tablet Take 1 Tab by mouth every six (6) hours as needed for Pain. Max Daily Amount: 4 Tabs. 20 Tab 0    pravastatin (PRAVACHOL) 20 mg tablet TAKE ONE TABLET BY MOUTH EVERY NIGHT AT BEDTIME 90 tablet 1    MULTIVITAMIN PO Take 1 tablet by mouth daily.  omeprazole (PRILOSEC) 40 mg capsule TAKE ONE CAPSULE BY MOUTH EVERY DAY 90 capsule 2    cholecalciferol, VITAMIN D3, (VITAMIN D3) 5,000 unit tab tablet Take 5,000 Units by mouth daily.  aspirin 81 mg chewable tablet Take 81 mg by mouth daily.  CALCIUM CARB/VIT D3/MINERALS (CALTRATE PLUS PO) Take 1 Tab by mouth daily.          Allergies   Allergen Reactions    Ciprofloxacin Other (comments)     Insomnia and hot flashes    Demerol [Meperidine] Other (comments)     hallucinations    Doxycycline Unknown (comments)       \"Feeling flushed\"    Floxin [Ofloxacin] Other (comments)     insomnia         Past Medical History:   Diagnosis Date    C section 3/12/2010    Depression 3/12/2010    Diabetes ([facility]  Environmental allergies 3/12/2010    H/O: hysterectomy 3/12/2010    High cholesterol 3/12/2010    HTN (hypertension) 3/12/2010    Pneumonia 2017         Past Surgical History:   Procedure Laterality Date    ENDOSCOPY, COLON, DIAGNOSTIC      repeat 10 yrs (Dr. Alvin Joseph)    HX BREAST BIOPSY  1980    both breast    HX BREAST BIOPSY  1986    left breast    HX  SECTION      x 3    HX CORNEAL TRANSPLANT  2011    left eye-     HX CYSTOSTOMY  3/2015    HX HYSTERECTOMY  1998         Family History   Problem Relation Age of Onset    Hypertension Mother     Other Mother      obesity, chf    Heart Disease Mother      CHF    Kidney Disease Mother     High Cholesterol Mother     Cancer Father      lung    Breast Cancer Sister     MS Sister     High Cholesterol Sister     Hypertension Sister     Hypertension Brother     Liver Disease Brother      Hep C    High Cholesterol Brother     Hypertension Sister     Diabetes Sister     High Cholesterol Sister     Hypertension Sister     Other Sister      obese    High Cholesterol Sister        Social History   Substance Use Topics    Smoking status: Never Smoker    Smokeless tobacco: Never Used    Alcohol use 0.0 oz/week     0 Standard drinks or equivalent per week      Comment: occasional        Lab Results   Component Value Date/Time    WBC 8.9 2017 12:32 PM    Hemoglobin (POC) 15.0 2016 05:26 PM    HGB 14.2 2017 12:32 PM    Hematocrit (POC) 44 2016 05:26 PM    HCT 42.7 2017 12:32 PM    PLATELET 027  12:32 PM    MCV 88 2017 12:32 PM       Lab Results   Component Value Date/Time    Cholesterol, total 199 2017 12:32 PM    HDL Cholesterol 39 2017 12:32 PM    LDL, calculated 130 2017 12:32 PM    Triglyceride 151 2017 12:32 PM    CHOL/HDL Ratio 4.9 2010 11:06 AM       Lab Results   Component Value Date/Time    TSH 1.430 2011 11:04 AM Lab Results   Component Value Date/Time    Sodium 142 03/13/2017 12:32 PM    Potassium 4.6 03/13/2017 12:32 PM    Chloride 101 03/13/2017 12:32 PM    CO2 26 03/13/2017 12:32 PM    Anion gap 5 03/11/2015 01:35 PM    Glucose 99 03/13/2017 12:32 PM    BUN 13 03/13/2017 12:32 PM    Creatinine 0.76 03/13/2017 12:32 PM    BUN/Creatinine ratio 17 03/13/2017 12:32 PM    GFR est AA 97 03/13/2017 12:32 PM    GFR est non-AA 84 03/13/2017 12:32 PM    Calcium 9.6 03/13/2017 12:32 PM    Bilirubin, total 0.4 03/13/2017 12:32 PM    ALT (SGPT) 15 03/13/2017 12:32 PM    AST (SGOT) 14 03/13/2017 12:32 PM    Alk. phosphatase 68 03/13/2017 12:32 PM    Protein, total 6.8 03/13/2017 12:32 PM    Albumin 4.4 03/13/2017 12:32 PM    Globulin 3.5 03/11/2015 01:35 PM    A-G Ratio 1.8 03/13/2017 12:32 PM      Lab Results   Component Value Date/Time    Hemoglobin A1c 6.7 10/15/2013 12:55 PM    Hemoglobin A1c (POC) 6.0 03/13/2017 12:32 PM         Review of Systems   Constitutional: Negative for malaise/fatigue. HENT: Negative for congestion. Eyes: Negative for blurred vision. Respiratory: Negative for cough and shortness of breath. Cardiovascular: Negative for chest pain, palpitations and leg swelling. Gastrointestinal: Negative for abdominal pain, constipation and heartburn. Genitourinary: Negative for dysuria, frequency and urgency. Musculoskeletal: Negative for back pain and joint pain. Neurological: Negative for dizziness, tingling and headaches. Endo/Heme/Allergies: Negative for environmental allergies. Psychiatric/Behavioral: Negative for depression. The patient does not have insomnia. Physical Exam   Constitutional: She appears well-developed and well-nourished.    /70 (BP 1 Location: Left arm, BP Patient Position: Sitting)  Pulse 79  Temp 98.1 °F (36.7 °C) (Oral)   Resp 18  Ht 5' 6\" (1.676 m)  Wt 251 lb 6.4 oz (114 kg)  LMP 11/19/1998  SpO2 96%  BMI 40.58 kg/m2    HENT:   Right Ear: Tympanic membrane and ear canal normal.   Left Ear: Tympanic membrane and ear canal normal.   Nose: No mucosal edema or rhinorrhea. Mouth/Throat: Oropharynx is clear and moist and mucous membranes are normal.   Neck: Normal range of motion. Neck supple. No thyromegaly present. Cardiovascular: Normal rate and regular rhythm. No murmur heard. Pulmonary/Chest: Effort normal and breath sounds normal.   Abdominal: Soft. Bowel sounds are normal. There is no tenderness. Musculoskeletal: Normal range of motion. She exhibits no edema. Lymphadenopathy:     She has no cervical adenopathy. Skin: Skin is warm and dry. Psychiatric: She has a normal mood and affect. Nursing note and vitals reviewed. ASSESSMENT and PLAN  Diagnoses and all orders for this visit:    1. Essential hypertension  Stable     2. Diet-controlled diabetes mellitus ([address]  Continue to monitor. Work on diet and exercise. 3. Mixed hyperlipidemia  Continue to monitor. Work on diet and exercise. 4. Stress due to illness of family member//  5. Depression, unspecified depression type  Stable with celexa and prn xanax. 6. Encounter for medication monitoring  -     METABOLIC PANEL, COMPREHENSIVE    7. Encounter for screening mammogram for breast cancer  -     [facility] MAMMO BI SCREENING INCL CAD; Future    8. Screening for osteoporosis  -     DEXA BONE DENSITY STUDY AXIAL; Future      Follow-up Disposition:  Return in about 5 months (around 1/11/2018) for physical.  reviewed diet, exercise and weight control  cardiovascular risk and specific lipid/LDL goals reviewed  reviewed medications and side effects in detail  specific diabetic recommendations: low cholesterol diet, weight control and daily exercise discussed and glycohemoglobin and other lab monitoring discussed     I have discussed diagnosis listed in this note with pt and/or family.  I have discussed treatment plans and options and the risk/benefit analysis of those options, including safe use of medications and possible medication side effects. Through the use of shared decision making we have agreed to the above plan. The patient has received an after-visit summary and questions were answered concerning future plans and follow up. Advise pt of any urgent changes then to proceed to the ER.

## 2017-08-11 NOTE — PROGRESS NOTES
Chief Complaint   Patient presents with    Hypertension     follow up    Cholesterol Problem     follow up     BMP  6/19/17    LIPID 6/19/17    A1C  6/19/17    MAMMO 5/16/16    COLONOSCOPY 4/2/15-  10 YEARS  [facility])    TDAP 6/19/17    PNEUMO 23  12/5/16

## 2017-08-11 NOTE — MR AVS SNAPSHOT
Visit Information Date & Time Provider Department Dept. Phone Encounter #  
 8/11/2017  9:00 AM Aurelio Garcia MD [facility] [phone] [id number] Follow-up Instructions Return in about 5 months (around 1/11/2018) for physical.  
  
Upcoming Health Maintenance Date Due INFLUENZA AGE 9 TO ADULT 8/1/2017 HEMOGLOBIN A1C Q6M 12/19/2017 BREAST CANCER SCRN MAMMOGRAM 5/16/2018 LIPID PANEL Q1 6/19/2018 PAP AKA CERVICAL CYTOLOGY 2/5/2019 COLONOSCOPY 4/2/2025 DTaP/Tdap/Td series (2 - Td) 6/19/2027 Allergies as of 8/11/2017  Review Complete On: 8/11/2017 By: Aurelio Garcia MD  
  
 Severity Noted Reaction Type Reactions Ciprofloxacin  08/30/2016    Other (comments) Insomnia and hot flashes Demerol [Meperidine]  06/14/2010    Other (comments)  
 hallucinations Doxycycline  06/14/2010    Unknown (comments) \"Feeling flushed\" Floxin [Ofloxacin]  06/14/2010    Other (comments)  
 insomnia Lisinopril  08/11/2017    Cough Current Immunizations  Reviewed on 8/11/2017 Name Date Influenza Vaccine 11/19/2014, 10/15/2013 Influenza Vaccine (Quad) PF 12/5/2016 Pneumococcal Polysaccharide (PPSV-23) 12/5/2016 Tdap 6/19/2017 Reviewed by Aurelio Garcia MD on 8/11/2017 at  9:56 AM  
You Were Diagnosed With   
  
 Codes Comments Essential hypertension    -  Primary ICD-10-CM: I10 
ICD-9-CM: 401.9 Diet-controlled diabetes mellitus ([facility]     ICD-10-CM: E11.9 ICD-9-CM: 250.00 Mixed hyperlipidemia     ICD-10-CM: E78.2 ICD-9-CM: 272.2 Encounter for medication monitoring     ICD-10-CM: Z51.81 
ICD-9-CM: V58.83 Encounter for screening mammogram for breast cancer     ICD-10-CM: Z12.31 
ICD-9-CM: V76.12 Vitals BP Pulse Temp Resp Height(growth percentile) Weight(growth percentile)  125/70 (BP 1 Location: Left arm, BP Patient Position: Sitting) 79 98.1 °F (36.7 °C) (Oral) 18 5' 6\" (1.676 m) 251 lb 6.4 oz (114 kg) LMP SpO2 BMI OB Status Smoking Status 11/19/1998 96% 40.58 kg/m2 Hysterectomy Never Smoker Vitals History BMI and BSA Data Body Mass Index Body Surface Area 40.58 kg/m 2 2.3 m 2 Preferred Pharmacy Pharmacy Name Phone Jaz[facility], [address] [place] [address] [phone] Your Updated Medication List  
  
   
This list is accurate as of: 8/11/17 10:07 AM.  Always use your most recent med list.  
  
  
  
  
 albuterol 90 mcg/actuation inhaler Commonly known as:  PROVENTIL HFA, VENTOLIN HFA, PROAIR HFA Take 2 Puffs by inhalation every four (4) hours as needed for Wheezing. ALPRAZolam 0.5 mg tablet Commonly known as:  Tayla Peppers Take 1 Tab by mouth nightly as needed. aspirin 81 mg chewable tablet Take 81 mg by mouth daily. CALTRATE PLUS PO Take 1 Tab by mouth daily. cholecalciferol (VITAMIN D3) 5,000 unit Tab tablet Commonly known as:  VITAMIN D3 Take 5,000 Units by mouth daily. citalopram 20 mg tablet Commonly known as:  CELEXA  
TAKE ONE TABLET BY MOUTH DAILY  
  
 cyanocobalamin 1,000 mcg tablet Take 1,000 mcg by mouth daily. fluticasone 50 mcg/actuation nasal spray Commonly known as:  [place] 2 sprays in each nostril once daily. furosemide 20 mg tablet Commonly known as:  LASIX TAKE ONE TABLET BY MOUTH DAILY  
  
 levocetirizine 5 mg tablet Commonly known as:  [place] TAKE ONE TABLET BY MOUTH DAILY losartan-hydroCHLOROthiazide 50-12.5 mg per tablet Commonly known as:  HYZAAR Take 1 Tab by mouth daily. MULTIVITAMIN PO Take 1 tablet by mouth daily. omeprazole 40 mg capsule Commonly known as:  PRILOSEC  
TAKE ONE CAPSULE BY MOUTH DAILY pravastatin 20 mg tablet Commonly known as:  PRAVACHOL  
TAKE ONE TABLET BY MOUTH EVERY NIGHT AT BEDTIME  
  
 PROBIOTIC PO Take 1 Tab by mouth daily. TYLENOL ARTHRITIS PAIN 650 mg CR tablet Generic drug:  acetaminophen Take 1,300 mg by mouth every six (6) hours as needed for Pain. We Performed the Following METABOLIC PANEL, COMPREHENSIVE [65913 CPT(R)] Follow-up Instructions Return in about 5 months (around 1/11/2018) for physical.  
  
To-Do List   
 08/11/2017 Imaging:  [facility] MAMMO BI SCREENING INCL CAD Introducing [facility]! Dear Yoan Juarez: 
Thank you for requesting a [employer] account. Our records indicate that you already have an active [employer] account. You can access your account anytime at [email or web address] Did you know that you can access your hospital and ER discharge instructions at any time in [employer]? You can also review all of your test results from your hospital stay or ER visit. Additional Information If you have questions, please visit the Frequently Asked Questions section of the [employer] website at [email or web address] Remember, [employer] is NOT to be used for urgent needs. For medical emergencies, dial 911. Now available from your iPhone and Android! Please provide this summary of care documentation to your next provider. Your primary care clinician is listed as Alexandria Benson. If you have any questions after today's visit, please call [phone].

## 2017-08-12 LAB
ALBUMIN SERPL-MCNC: 4.6 G/DL (ref 3.6–4.8)
ALBUMIN/GLOB SERPL: 2.1 {RATIO} (ref 1.2–2.2)
ALP SERPL-CCNC: 70 IU/L (ref 39–117)
ALT SERPL-CCNC: 9 IU/L (ref 0–32)
AST SERPL-CCNC: 9 IU/L (ref 0–40)
BILIRUB SERPL-MCNC: 0.2 MG/DL (ref 0–1.2)
BUN SERPL-MCNC: 13 MG/DL (ref 8–27)
BUN/CREAT SERPL: 16 (ref 12–28)
CALCIUM SERPL-MCNC: 9.7 MG/DL (ref 8.7–10.3)
CHLORIDE SERPL-SCNC: 97 MMOL/L (ref 96–106)
CO2 SERPL-SCNC: 27 MMOL/L (ref 18–29)
CREAT SERPL-MCNC: 0.82 MG/DL (ref 0.57–1)
GLOBULIN SER CALC-MCNC: 2.2 G/DL (ref 1.5–4.5)
GLUCOSE SERPL-MCNC: 105 MG/DL (ref 65–99)
POTASSIUM SERPL-SCNC: 4.1 MMOL/L (ref 3.5–5.2)
PROT SERPL-MCNC: 6.8 G/DL (ref 6–8.5)
SODIUM SERPL-SCNC: 141 MMOL/L (ref 134–144)

## 2017-09-06 ENCOUNTER — TELEPHONE (OUTPATIENT)
Dept: FAMILY MEDICINE CLINIC | Age: 62
End: 2017-09-06

## 2017-10-06 ENCOUNTER — TELEPHONE (OUTPATIENT)
Dept: FAMILY MEDICINE CLINIC | Age: 62
End: 2017-10-06

## 2017-10-06 DIAGNOSIS — Z80.3 FAMILY HISTORY OF BREAST CANCER IN SISTER: ICD-10-CM

## 2017-10-06 DIAGNOSIS — R92.8 ABNORMAL FINDING ON BREAST IMAGING: Primary | ICD-10-CM

## 2017-10-06 NOTE — TELEPHONE ENCOUNTER
Pt.would like a call back regarding test results from Mammogram and ultrasound. Her call back # [phone]

## 2017-10-06 NOTE — TELEPHONE ENCOUNTER
Spoke with patient and states she had an breast ultrasound at [address] [place] [address] and wondering if Dr. Ricardo Pacheco has received the report. Informed patient that we have not received report.  Patient stated she would call have have them fax report to  Dr. Ricardo Pacheco

## 2017-10-09 NOTE — TELEPHONE ENCOUNTER
Patient LM she would like to speak to Dr. Durbin regarding her mammogram and ultrasound. Have not received reports. Called pt LM have received mammogram report and ultrasound and have given to Dr. Durbin.

## 2017-10-10 DIAGNOSIS — Z12.31 ENCOUNTER FOR SCREENING MAMMOGRAM FOR BREAST CANCER: ICD-10-CM

## 2017-10-12 NOTE — TELEPHONE ENCOUNTER
Called. Pt would like to see specialist concerning abnormality on the mammogram d/t fh of breast cancer.

## 2017-10-16 RX ORDER — LEVOCETIRIZINE DIHYDROCHLORIDE 5 MG/1
TABLET, FILM COATED ORAL
Qty: 30 TAB | Refills: 6 | Status: SHIPPED | OUTPATIENT
Start: 2017-10-16 | End: 2018-08-28 | Stop reason: SDUPTHER

## 2017-10-24 ENCOUNTER — DOCUMENTATION ONLY (OUTPATIENT)
Dept: SURGERY | Age: 62
End: 2017-10-24

## 2017-10-24 NOTE — PROGRESS NOTES
Confirm appointment location with patient. Per patient she has not gotten her films from [facility]. I advised patient they she will need to bring films/reports if not she will have to reschedule appt.

## 2017-10-25 ENCOUNTER — OFFICE VISIT (OUTPATIENT)
Dept: SURGERY | Age: 62
End: 2017-10-25

## 2017-10-25 ENCOUNTER — DOCUMENTATION ONLY (OUTPATIENT)
Dept: SURGERY | Age: 62
End: 2017-10-25

## 2017-10-25 VITALS
SYSTOLIC BLOOD PRESSURE: 135 MMHG | HEIGHT: 66 IN | HEART RATE: 73 BPM | WEIGHT: 251 LBS | BODY MASS INDEX: 40.34 KG/M2 | DIASTOLIC BLOOD PRESSURE: 67 MMHG

## 2017-10-25 DIAGNOSIS — Z91.89 AT HIGH RISK FOR BREAST CANCER: Primary | ICD-10-CM

## 2017-10-25 RX ORDER — LANOLIN ALCOHOL/MO/W.PET/CERES
3 CREAM (GRAM) TOPICAL
COMMUNITY

## 2017-10-25 NOTE — COMMUNICATION BODY
HISTORY OF PRESENT ILLNESS  Andre Ladd is a 60 y.o. female. HPI   NEW patient presents for consultation at the request of Dr. Beck Her for recent abnormal screening mammogram and FH of breast cancer. The patient was called back for additional views due to a possible mass in the LEFT breast.  Additional views were normal.    The patient is not having any breast complaints, feels no lumps, has no pain, no nipple discharge/retraction or skin change. She has had three breast biopsies in the past, two on the LEFT and one on the RIGHT, all with benign pathology. FH is significant for her sister who was diagnosed with breast CA at age 32. A niece had cervical cancer. Recent imaging has been at [facility]. Past Medical History:   Diagnosis Date    C section 3/12/2010    Depression 3/12/2010    Diabetes ([address]     Environmental allergies 3/12/2010    H/O: hysterectomy 3/12/2010    High cholesterol 3/12/2010    HTN (hypertension) 3/12/2010    Pneumonia 2017       Past Surgical History:   Procedure Laterality Date    ENDOSCOPY, COLON, DIAGNOSTIC      repeat 10 yrs (Dr. [facility])    HX BREAST BIOPSY  1980    both breast    HX BREAST BIOPSY  1986    left breast    HX  SECTION      x 3    HX CORNEAL TRANSPLANT  2011    left eye-     HX CYSTOSTOMY  3/2015    HX HYSTERECTOMY  1998       Social History     Social History    Marital status:      Spouse name: N/A    Number of children: N/A    Years of education: N/A     Occupational History    Not on file.      Social History Main Topics    Smoking status: Never Smoker    Smokeless tobacco: Never Used    Alcohol use 0.0 oz/week     0 Standard drinks or equivalent per week      Comment: occasional    Drug use: No    Sexual activity: Yes     Partners: Male     Birth control/ protection: Surgical     Other Topics Concern    Not on file     Social History Narrative       Current Outpatient Prescriptions on File Prior to Visit   Medication Sig Dispense Refill    levocetirizine (XYZAL) 5 mg tablet TAKE ONE TABLET BY MOUTH DAILY 30 Tab 6    omeprazole (PRILOSEC) 40 mg capsule TAKE ONE CAPSULE BY MOUTH DAILY 90 Cap 3    losartan-hydroCHLOROthiazide (HYZAAR) 50-12.5 mg per tablet Take 1 Tab by mouth daily. 90 Tab 3    acetaminophen (TYLENOL ARTHRITIS PAIN) 650 mg CR tablet Take 1,300 mg by mouth every six (6) hours as needed for Pain.  fluticasone (FLONASE) 50 mcg/actuation nasal spray Place 2 sprays in each nostril once daily. 1 Bottle 11    albuterol (PROVENTIL HFA, VENTOLIN HFA, PROAIR HFA) 90 mcg/actuation inhaler Take 2 Puffs by inhalation every four (4) hours as needed for Wheezing. 1 Inhaler 0    LACTOBACILLUS ACIDOPHILUS (PROBIOTIC PO) Take 1 Tab by mouth daily.  ALPRAZolam (XANAX) 0.5 mg tablet Take 1 Tab by mouth nightly as needed. 90 Tab 1    citalopram (CELEXA) 20 mg tablet TAKE ONE TABLET BY MOUTH DAILY 90 Tab 3    furosemide (LASIX) 20 mg tablet TAKE ONE TABLET BY MOUTH DAILY 90 Tab 2    cyanocobalamin 1,000 mcg tablet Take 1,000 mcg by mouth daily.  pravastatin (PRAVACHOL) 20 mg tablet TAKE ONE TABLET BY MOUTH EVERY NIGHT AT BEDTIME 90 tablet 1    MULTIVITAMIN PO Take 1 tablet by mouth daily.  cholecalciferol, VITAMIN D3, (VITAMIN D3) 5,000 unit tab tablet Take 5,000 Units by mouth daily.  aspirin 81 mg chewable tablet Take 81 mg by mouth daily.  CALCIUM CARB/VIT D3/MINERALS (CALTRATE PLUS PO) Take 1 Tab by mouth daily. No current facility-administered medications on file prior to visit.         Allergies   Allergen Reactions    Ciprofloxacin Other (comments)     Insomnia and hot flashes    Demerol [Meperidine] Other (comments)     hallucinations    Doxycycline Unknown (comments)       \"Feeling flushed\"    Floxin [Ofloxacin] Other (comments)     insomnia    Lisinopril Cough    Prednisone Other (comments)     Has feelings of depression, \"weird thoughts\" OB History     Obstetric Comments    Menarche:  14. LMP: 43.  # of Children:  3. Age at Delivery of First Child:  24.   Hysterectomy/oophorectomy:  YES/YES. Breast Bx:  Yes. Hx of Breast Feeding:  No.  BCP:  Yes, in the past. Hormone therapy:  Briefly in the past.             ROS  Constitutional: Negative. Positive for weight gain. HENT: Negative. Eyes: Negative. Respiratory: Negative. Cardiovascular: Positive for claudication and leg swelling. Gastrointestinal: Positive for heartburn. Genitourinary: Negative. Positive for nocturia. Musculoskeletal: Positive for joint pain. Skin: Negative. Neurological: Negative. Endo/Heme/Allergies: Negative. Psychiatric/Behavioral: The patient is nervous/anxious. Physical Exam   Cardiovascular: Normal rate and normal heart sounds. Pulmonary/Chest: Breath sounds normal. Right breast exhibits no inverted nipple, no mass, no nipple discharge, no skin change and no tenderness. Left breast exhibits no inverted nipple, no mass, no nipple discharge, no skin change and no tenderness. Breasts are symmetrical.   Lymphadenopathy:        Right cervical: No superficial cervical, no deep cervical and no posterior cervical adenopathy present. Left cervical: No superficial cervical, no deep cervical and no posterior cervical adenopathy present. Right axillary: No pectoral and no lateral adenopathy present. Left axillary: No pectoral and no lateral adenopathy present. ASSESSMENT and PLAN    ICD-10-CM ICD-9-CM    1. At high risk for breast cancer Z91.89 V49.89      Pt presents with abnormal mammo of LEFT breast and significant family hx of breast cancer. After reviewing recent imaging, area of concern looks consistent with benign breast cyst. Normal exam today. Using the [address], calculated pt's lifetime risk at 28% for breast cancer.  This qualifies her for a baseline MRI as well as enrollment into our high risk clinic that includes annual breast imaging and follow-up appointments with our nurse practitioner. Will order MRI and f/u once these results become available. Pt will f/u with Pina Trevino NP in 1 year. This plan was reviewed with the patient and patient agrees. All questions were answered.     Written by Alfa Acosta, as dictated by Dr. Donna Hanson MD. complains of pain/discomfort

## 2017-10-25 NOTE — PROGRESS NOTES
Type of Film: [x] CD [] FILMS  Type of Test: [] MRI [x] MAMMO  From: [address]  Given to: [facility]  To be Downloaded into PACS:  YES    Patient will be scheduled for a breast MRI

## 2017-10-25 NOTE — PROGRESS NOTES
HISTORY OF PRESENT ILLNESS  Andre Ladd is a 60 y.o. female. HPI   NEW patient presents for consultation at the request of Dr. Beck Her for recent abnormal screening mammogram and FH of breast cancer. The patient was called back for additional views due to a possible mass in the LEFT breast.  Additional views were normal.    The patient is not having any breast complaints, feels no lumps, has no pain, no nipple discharge/retraction or skin change. She has had three breast biopsies in the past, two on the LEFT and one on the RIGHT, all with benign pathology. FH is significant for her sister who was diagnosed with breast CA at age 32. A niece had cervical cancer. Recent imaging has been at [facility]. Review of Systems   Constitutional: Negative. Positive for weight gain. HENT: Negative. Eyes: Negative. Respiratory: Negative. Cardiovascular: Positive for claudication and leg swelling. Gastrointestinal: Positive for heartburn. Genitourinary: Negative. Positive for nocturia. Musculoskeletal: Positive for joint pain. Skin: Negative. Neurological: Negative. Endo/Heme/Allergies: Negative. Psychiatric/Behavioral: The patient is nervous/anxious.         Physical Exam    ASSESSMENT and PLAN  {ASSESSMENT/PLAN:73908}

## 2017-10-25 NOTE — LETTER
10/25/2017 12:21 PM 
 
Patient:  Chris Mcqueen YOB: 1955 Date of Visit: 10/25/2017 Dear Dr. Royal Vora: Thank you for referring Ms. Chris Mcqueen to me for evaluation/treatment. Below are the relevant portions of my assessment and plan of care. HISTORY OF PRESENT ILLNESS Chris Mcqueen is a 60 y.o. female. HPI  
NEW patient presents for consultation at the request of Dr. Royal Vora for recent abnormal screening mammogram and FH of breast cancer. The patient was called back for additional views due to a possible mass in the LEFT breast.  Additional views were normal.   
The patient is not having any breast complaints, feels no lumps, has no pain, no nipple discharge/retraction or skin change. She has had three breast biopsies in the past, two on the LEFT and one on the RIGHT, all with benign pathology. FH is significant for her sister who was diagnosed with breast CA at age 32. A niece had cervical cancer. Recent imaging has been at [facility]. Past Medical History:  
Diagnosis Date  C section 3/12/2010  Depression 3/12/2010  Diabetes ([facility] [address]  Environmental allergies 3/12/2010  
 H/O: hysterectomy 3/12/2010  High cholesterol 3/12/2010  
 HTN (hypertension) 3/12/2010  Pneumonia 2017 Past Surgical History:  
Procedure Laterality Date  ENDOSCOPY, COLON, DIAGNOSTIC    
 repeat 10 yrs (Dr. Leonel Ernandez)  HX BREAST BIOPSY  1980  
 both breast  
 HX BREAST BIOPSY  1986 left breast  
 HX  SECTION    
 x 3  
 HX CORNEAL TRANSPLANT  2011  
 left eye-   
 HX CYSTOSTOMY  3/2015  HX HYSTERECTOMY  1998 Social History Social History  Marital status:  Spouse name: N/A  
 Number of children: N/A  
 Years of education: N/A Occupational History  Not on file. Social History Main Topics  Smoking status: Never Smoker  Smokeless tobacco: Never Used  Alcohol use 0.0 oz/week  
  0 Standard drinks or equivalent per week Comment: occasional  
 Drug use: No  
 Sexual activity: Yes  
  Partners: Male Birth control/ protection: Surgical  
 
Other Topics Concern  Not on file Social History Narrative Current Outpatient Prescriptions on File Prior to Visit Medication Sig Dispense Refill  levocetirizine (XYZAL) 5 mg tablet TAKE ONE TABLET BY MOUTH DAILY 30 Tab 6  
 omeprazole (PRILOSEC) 40 mg capsule TAKE ONE CAPSULE BY MOUTH DAILY 90 Cap 3  
 losartan-hydroCHLOROthiazide (HYZAAR) 50-12.5 mg per tablet Take 1 Tab by mouth daily. 90 Tab 3  
 acetaminophen (TYLENOL ARTHRITIS PAIN) 650 mg CR tablet Take 1,300 mg by mouth every six (6) hours as needed for Pain.  fluticasone (FLONASE) 50 mcg/actuation nasal spray Place 2 sprays in each nostril once daily. 1 Bottle 11  
 albuterol (PROVENTIL HFA, VENTOLIN HFA, PROAIR HFA) 90 mcg/actuation inhaler Take 2 Puffs by inhalation every four (4) hours as needed for Wheezing. 1 Inhaler 0  
 LACTOBACILLUS ACIDOPHILUS (PROBIOTIC PO) Take 1 Tab by mouth daily.  ALPRAZolam (XANAX) 0.5 mg tablet Take 1 Tab by mouth nightly as needed. 90 Tab 1  citalopram (CELEXA) 20 mg tablet TAKE ONE TABLET BY MOUTH DAILY 90 Tab 3  furosemide (LASIX) 20 mg tablet TAKE ONE TABLET BY MOUTH DAILY 90 Tab 2  cyanocobalamin 1,000 mcg tablet Take 1,000 mcg by mouth daily.  pravastatin (PRAVACHOL) 20 mg tablet TAKE ONE TABLET BY MOUTH EVERY NIGHT AT BEDTIME 90 tablet 1  
 MULTIVITAMIN PO Take 1 tablet by mouth daily.  cholecalciferol, VITAMIN D3, (VITAMIN D3) 5,000 unit tab tablet Take 5,000 Units by mouth daily.  aspirin 81 mg chewable tablet Take 81 mg by mouth daily.  CALCIUM CARB/VIT D3/MINERALS (CALTRATE PLUS PO) Take 1 Tab by mouth daily. No current facility-administered medications on file prior to visit. Allergies Allergen Reactions  Ciprofloxacin Other (comments) Insomnia and hot flashes  Demerol [Meperidine] Other (comments)  
  hallucinations  Doxycycline Unknown (comments) \"Feeling flushed\"  Floxin [Ofloxacin] Other (comments)  
  insomnia  Lisinopril Cough  Prednisone Other (comments) Has feelings of depression, \"weird thoughts\" OB History Obstetric Comments Menarche:  14. LMP: 43.  # of Children:  3. Age at Delivery of First Child:  24.   Hysterectomy/oophorectomy:  YES/YES. Breast Bx:  Yes. Hx of Breast Feeding:  No.  BCP:  Yes, in the past. Hormone therapy:  Briefly in the past.  
  
  
 
 
ROS Constitutional: Negative. Positive for weight gain. HENT: Negative. Eyes: Negative. Respiratory: Negative. Cardiovascular: Positive for claudication and leg swelling. Gastrointestinal: Positive for heartburn. Genitourinary: Negative. Positive for nocturia. Musculoskeletal: Positive for joint pain. Skin: Negative. Neurological: Negative. Endo/Heme/Allergies: Negative. Psychiatric/Behavioral: The patient is nervous/anxious. Physical Exam  
Cardiovascular: Normal rate and normal heart sounds. Pulmonary/Chest: Breath sounds normal. Right breast exhibits no inverted nipple, no mass, no nipple discharge, no skin change and no tenderness. Left breast exhibits no inverted nipple, no mass, no nipple discharge, no skin change and no tenderness. Breasts are symmetrical.  
Lymphadenopathy:  
     Right cervical: No superficial cervical, no deep cervical and no posterior cervical adenopathy present. Left cervical: No superficial cervical, no deep cervical and no posterior cervical adenopathy present. Right axillary: No pectoral and no lateral adenopathy present. Left axillary: No pectoral and no lateral adenopathy present. ASSESSMENT and PLAN 
  ICD-10-CM ICD-9-CM 1.  At high risk for breast cancer Z91.89 V49.89   
 
 Pt presents with abnormal mammo of LEFT breast and significant family hx of breast cancer. After reviewing recent imaging, area of concern looks consistent with benign breast cyst. Normal exam today. Using the [address], calculated pt's lifetime risk at 28% for breast cancer. This qualifies her for a baseline MRI as well as enrollment into our high risk clinic that includes annual breast imaging and follow-up appointments with our nurse practitioner. Will order MRI and f/u once these results become available. Pt will f/u with Gal Rivera NP in 1 year. This plan was reviewed with the patient and patient agrees. All questions were answered. Written by Richelle Franklin, as dictated by Dr. Jorge Luis Alva MD.  
 
 
 
If you have questions, please do not hesitate to call me. I look forward to following Ms. Montoya along with you.  
 
 
 
Sincerely, 
 
 
Edison Keenan MD

## 2017-10-25 NOTE — PROGRESS NOTES
HISTORY OF PRESENT ILLNESS  Alisha Galvin is a 60 y.o. female. HPI   NEW patient presents for consultation at the request of Dr. Urbano Ramos for recent abnormal screening mammogram and FH of breast cancer. The patient was called back for additional views due to a possible mass in the LEFT breast.  Additional views were normal.    The patient is not having any breast complaints, feels no lumps, has no pain, no nipple discharge/retraction or skin change. She has had three breast biopsies in the past, two on the LEFT and one on the RIGHT, all with benign pathology. FH is significant for her sister who was diagnosed with breast CA at age 32. A niece had cervical cancer. [facility]. Past Medical History:   Diagnosis Date    C section 3/12/2010    Depression 3/12/2010    Diabetes ([address]     Environmental allergies 3/12/2010    H/O: hysterectomy 3/12/2010    High cholesterol 3/12/2010    HTN (hypertension) 3/12/2010    Pneumonia 2017       Past Surgical History:   Procedure Laterality Date    ENDOSCOPY, COLON, DIAGNOSTIC      repeat 10 yrs (Dr. Ralf Felix)    HX BREAST BIOPSY  1980    both breast    HX BREAST BIOPSY  1986    left breast    HX  SECTION      x 3    HX CORNEAL TRANSPLANT  2011    left eye-     HX CYSTOSTOMY  3/2015    HX HYSTERECTOMY  1998       Social History     Social History    Marital status:      Spouse name: N/A    Number of children: N/A    Years of education: N/A     Occupational History    Not on file.      Social History Main Topics    Smoking status: Never Smoker    Smokeless tobacco: Never Used    Alcohol use 0.0 oz/week     0 Standard drinks or equivalent per week      Comment: occasional    Drug use: No    Sexual activity: Yes     Partners: Male     Birth control/ protection: Surgical     Other Topics Concern    Not on file     Social History Narrative       Current Outpatient Prescriptions on File Prior to Visit   Medication Sig Dispense Refill    levocetirizine (XYZAL) 5 mg tablet TAKE ONE TABLET BY MOUTH DAILY 30 Tab 6    omeprazole (PRILOSEC) 40 mg capsule TAKE ONE CAPSULE BY MOUTH DAILY 90 Cap 3    losartan-hydroCHLOROthiazide (HYZAAR) 50-12.5 mg per tablet Take 1 Tab by mouth daily. 90 Tab 3    acetaminophen (TYLENOL ARTHRITIS PAIN) 650 mg CR tablet Take 1,300 mg by mouth every six (6) hours as needed for Pain.  fluticasone (FLONASE) 50 mcg/actuation nasal spray Place 2 sprays in each nostril once daily. 1 Bottle 11    albuterol (PROVENTIL HFA, VENTOLIN HFA, PROAIR HFA) 90 mcg/actuation inhaler Take 2 Puffs by inhalation every four (4) hours as needed for Wheezing. 1 Inhaler 0    LACTOBACILLUS ACIDOPHILUS (PROBIOTIC PO) Take 1 Tab by mouth daily.  ALPRAZolam (XANAX) 0.5 mg tablet Take 1 Tab by mouth nightly as needed. 90 Tab 1    citalopram (CELEXA) 20 mg tablet TAKE ONE TABLET BY MOUTH DAILY 90 Tab 3    furosemide (LASIX) 20 mg tablet TAKE ONE TABLET BY MOUTH DAILY 90 Tab 2    cyanocobalamin 1,000 mcg tablet Take 1,000 mcg by mouth daily.  pravastatin (PRAVACHOL) 20 mg tablet TAKE ONE TABLET BY MOUTH EVERY NIGHT AT BEDTIME 90 tablet 1    MULTIVITAMIN PO Take 1 tablet by mouth daily.  cholecalciferol, VITAMIN D3, (VITAMIN D3) 5,000 unit tab tablet Take 5,000 Units by mouth daily.  aspirin 81 mg chewable tablet Take 81 mg by mouth daily.  CALCIUM CARB/VIT D3/MINERALS (CALTRATE PLUS PO) Take 1 Tab by mouth daily. No current facility-administered medications on file prior to visit.         Allergies   Allergen Reactions    Ciprofloxacin Other (comments)     Insomnia and hot flashes    Demerol [Meperidine] Other (comments)     hallucinations    Doxycycline Unknown (comments)       \"Feeling flushed\"    Floxin [Ofloxacin] Other (comments)     insomnia    Lisinopril Cough    Prednisone Other (comments)     Has feelings of depression, \"weird thoughts\" OB History     Obstetric Comments    Menarche:  14. LMP: 43.  # of Children:  3. Age at Delivery of First Child:  24.   Hysterectomy/oophorectomy:  YES/YES. Breast Bx:  Yes. Hx of Breast Feeding:  No.  BCP:  Yes, in the past. Hormone therapy:  Briefly in the past.             ROS  Constitutional: Negative. Positive for weight gain. HENT: Negative. Eyes: Negative. Respiratory: Negative. Cardiovascular: Positive for claudication and leg swelling. Gastrointestinal: Positive for heartburn. Genitourinary: Negative. Positive for nocturia. Musculoskeletal: Positive for joint pain. Skin: Negative. Neurological: Negative. Endo/Heme/Allergies: Negative. Psychiatric/Behavioral: The patient is nervous/anxious. Physical Exam   Cardiovascular: Normal rate and normal heart sounds. Pulmonary/Chest: Breath sounds normal. Right breast exhibits no inverted nipple, no mass, no nipple discharge, no skin change and no tenderness. Left breast exhibits no inverted nipple, no mass, no nipple discharge, no skin change and no tenderness. Breasts are symmetrical.   Lymphadenopathy:        Right cervical: No superficial cervical, no deep cervical and no posterior cervical adenopathy present. Left cervical: No superficial cervical, no deep cervical and no posterior cervical adenopathy present. Right axillary: No pectoral and no lateral adenopathy present. Left axillary: No pectoral and no lateral adenopathy present. ASSESSMENT and PLAN    ICD-10-CM ICD-9-CM    1. At high risk for breast cancer Z91.89 V49.89      Pt presents with abnormal mammo of LEFT breast and significant family hx of breast cancer. After reviewing recent imaging, area of concern looks consistent with benign breast cyst. Normal exam today. Using the [address], calculated pt's lifetime risk at 28% for breast cancer.  This qualifies her for a baseline MRI as well as enrollment into our high risk clinic that includes annual breast imaging and follow-up appointments with our nurse practitioner. Will order MRI and f/u once these results become available. Pt will f/u with Deion Sung NP in 1 year. This plan was reviewed with the patient and patient agrees. All questions were answered.     Written by Loren Rojas, as dictated by Dr. Kaila Adams MD.

## 2017-11-09 ENCOUNTER — TELEPHONE (OUTPATIENT)
Dept: SURGERY | Age: 62
End: 2017-11-09

## 2017-11-09 NOTE — TELEPHONE ENCOUNTER
Patient calling asking time and where to show up for MRI on 11/13. I called patient back and gave her  number (#[phone]). I told her to call us back if she does not get her questions answered. She is appreciative.

## 2017-11-13 ENCOUNTER — HOSPITAL ENCOUNTER (OUTPATIENT)
Dept: MRI IMAGING | Age: 62
Discharge: HOME | End: 2017-11-13
Attending: SURGERY
Payer: COMMERCIAL

## 2017-11-13 ENCOUNTER — TELEPHONE (OUTPATIENT)
Dept: SURGERY | Age: 62
End: 2017-11-13

## 2017-11-13 DIAGNOSIS — Z91.89 AT HIGH RISK FOR BREAST CANCER: ICD-10-CM

## 2017-11-13 PROCEDURE — A9585 GADOBUTROL INJECTION: HCPCS | Performed by: SURGERY

## 2017-11-13 PROCEDURE — 74011250636 HC RX REV CODE- 250/636: Performed by: SURGERY

## 2017-11-13 PROCEDURE — 77059 MRI BREAST BI W WO CONT: CPT

## 2017-11-13 PROCEDURE — 74011000258 HC RX REV CODE- 258: Performed by: SURGERY

## 2017-11-13 RX ADMIN — GADOBUTROL 11.5 ML: 604.72 INJECTION INTRAVENOUS at 10:00

## 2017-11-13 RX ADMIN — SODIUM CHLORIDE 100 ML: 900 INJECTION, SOLUTION INTRAVENOUS at 10:00

## 2017-11-13 NOTE — TELEPHONE ENCOUNTER
Informed patient that breast MRI was normal.  Asked her to follow-up with Emmie Aguilar NP in one year. She asked about her mammogram, and I told her that she should get her mammogram after 8/11/18. She asked about whether or not to get a 3D mammogram, and I told her that Dr. Cierra Mckeon would recommend a 3D mammogram.    She was very appreciative of the phone call.

## 2017-11-25 DIAGNOSIS — I10 ESSENTIAL HYPERTENSION: ICD-10-CM

## 2017-11-27 RX ORDER — FUROSEMIDE 20 MG/1
TABLET ORAL
Qty: 90 TAB | Refills: 1 | Status: SHIPPED | OUTPATIENT
Start: 2017-11-27 | End: 2018-05-26 | Stop reason: SDUPTHER

## 2017-11-30 ENCOUNTER — HOSPITAL ENCOUNTER (EMERGENCY)
Age: 62
Discharge: HOME | End: 2017-11-30
Attending: FAMILY MEDICINE

## 2017-11-30 VITALS
HEART RATE: 81 BPM | SYSTOLIC BLOOD PRESSURE: 157 MMHG | DIASTOLIC BLOOD PRESSURE: 76 MMHG | BODY MASS INDEX: 40.66 KG/M2 | RESPIRATION RATE: 18 BRPM | HEIGHT: 66 IN | OXYGEN SATURATION: 97 % | TEMPERATURE: 97.5 F | WEIGHT: 253 LBS

## 2017-11-30 DIAGNOSIS — J20.9 ACUTE BRONCHITIS, UNSPECIFIED ORGANISM: Primary | ICD-10-CM

## 2017-11-30 RX ORDER — BENZONATATE 200 MG/1
200 CAPSULE ORAL
Qty: 21 CAP | Refills: 0 | Status: SHIPPED | OUTPATIENT
Start: 2017-11-30 | End: 2017-12-07

## 2017-11-30 RX ORDER — ALBUTEROL SULFATE 90 UG/1
2 AEROSOL, METERED RESPIRATORY (INHALATION)
Qty: 1 INHALER | Refills: 0 | Status: SHIPPED | OUTPATIENT
Start: 2017-11-30 | End: 2018-05-15 | Stop reason: SDUPTHER

## 2017-11-30 RX ORDER — AZITHROMYCIN 250 MG/1
TABLET, FILM COATED ORAL
Qty: 6 TAB | Refills: 0 | Status: SHIPPED | OUTPATIENT
Start: 2017-11-30 | End: 2018-01-05 | Stop reason: ALTCHOICE

## 2017-11-30 RX ORDER — IPRATROPIUM BROMIDE AND ALBUTEROL SULFATE 2.5; .5 MG/3ML; MG/3ML
3 SOLUTION RESPIRATORY (INHALATION)
Status: COMPLETED | OUTPATIENT
Start: 2017-11-30 | End: 2017-11-30

## 2017-11-30 RX ORDER — METHYLPREDNISOLONE 4 MG/1
TABLET ORAL
Qty: 1 DOSE PACK | Refills: 0 | Status: SHIPPED | OUTPATIENT
Start: 2017-11-30 | End: 2018-01-05

## 2017-11-30 RX ADMIN — IPRATROPIUM BROMIDE AND ALBUTEROL SULFATE 3 ML: 2.5; .5 SOLUTION RESPIRATORY (INHALATION) at 17:15

## 2017-11-30 NOTE — UC PROVIDER NOTE
Patient is a 60 y.o. female presenting with cough. The history is provided by the patient. Cough   This is a new problem. The current episode started more than 2 days ago. The problem occurs every few minutes. The problem has been rapidly worsening. The cough is productive of sputum and productive of bloody sputum. There has been no fever. Associated symptoms include ear congestion, sore throat, shortness of breath and wheezing. Pertinent negatives include no chest pain, no chills, no headaches, no rhinorrhea, no nausea and no vomiting. She has tried nothing for the symptoms. She is not a smoker. Her past medical history is significant for bronchitis. Her past medical history does not include asthma.         Past Medical History:   Diagnosis Date    C section 3/12/2010    Depression 3/12/2010    Diabetes ([address]     Environmental allergies 3/12/2010    H/O: hysterectomy 3/12/2010    High cholesterol 3/12/2010    HTN (hypertension) 3/12/2010    Pneumonia 2017        Past Surgical History:   Procedure Laterality Date    ENDOSCOPY, COLON, DIAGNOSTIC      repeat 10 yrs (Dr. Hitchcock Fearing)    HX BREAST BIOPSY  1980    both breast    HX BREAST BIOPSY  1986    left breast    HX  SECTION      x 3    HX CORNEAL TRANSPLANT  2011    left eye-     HX CYSTOSTOMY  3/2015    HX HYSTERECTOMY  1998         Family History   Problem Relation Age of Onset    Hypertension Mother     Other Mother      obesity, chf    Heart Disease Mother      CHF    Kidney Disease Mother     High Cholesterol Mother     Cancer Father      lung    Breast Cancer Sister     MS Sister     High Cholesterol Sister     Hypertension Sister     Hypertension Brother     Liver Disease Brother      Hep C    High Cholesterol Brother     Cancer Brother      colon    Hypertension Sister     Diabetes Sister     High Cholesterol Sister     Hypertension Sister     Other Sister      obese    High Cholesterol Sister         Social History     Social History    Marital status:      Spouse name: N/A    Number of children: N/A    Years of education: N/A     Occupational History    Not on file. Social History Main Topics    Smoking status: Never Smoker    Smokeless tobacco: Never Used    Alcohol use 0.0 oz/week     0 Standard drinks or equivalent per week      Comment: occasional    Drug use: No    Sexual activity: Yes     Partners: Male     Birth control/ protection: Surgical     Other Topics Concern    Not on file     Social History Narrative                ALLERGIES: Ciprofloxacin; Demerol [meperidine]; Doxycycline; Floxin [ofloxacin]; Lisinopril; and Prednisone    Review of Systems   Constitutional: Negative for chills. HENT: Positive for sore throat. Negative for rhinorrhea. Respiratory: Positive for cough, shortness of breath and wheezing. Cardiovascular: Negative for chest pain. Gastrointestinal: Negative for nausea and vomiting. Neurological: Negative for headaches. All other systems reviewed and are negative. Vitals:    11/30/17 1641   BP: 157/76   Pulse: 81   Resp: 18   Temp: 97.5 °F (36.4 °C)   SpO2: 97%   Weight: 114.8 kg (253 lb)   Height: 5' 6\" (1.676 m)       Physical Exam   Constitutional: No distress. HENT:   Right Ear: Tympanic membrane and ear canal normal.   Left Ear: Tympanic membrane and ear canal normal.   Nose: Nose normal.   Mouth/Throat: No oropharyngeal exudate, posterior oropharyngeal edema or posterior oropharyngeal erythema. Eyes: Conjunctivae are normal. Right eye exhibits no discharge. Left eye exhibits no discharge. Neck: Neck supple. Pulmonary/Chest: Effort normal. No respiratory distress. She has decreased breath sounds. She has no wheezes. She has rhonchi. She has no rales. Lymphadenopathy:     She has no cervical adenopathy. Skin: No rash noted. Nursing note and vitals reviewed.       MDM     Differential Diagnosis; Clinical Impression; Plan:     CLINICAL IMPRESSION:  Acute bronchitis, unspecified organism  (primary encounter diagnosis)      DDX    Plan:    zpak- albuterol=- tessalon and use prednisone if not better  Amount and/or Complexity of Data Reviewed:    Review and summarize past medical records:  Yes  Risk of Significant Complications, Morbidity, and/or Mortality:   Presenting problems: Moderate  Management options:   Moderate  Progress:   Patient progress:  Stable      Procedures

## 2017-11-30 NOTE — DISCHARGE INSTRUCTIONS
Bronchitis: Care Instructions  Your Care Instructions    Bronchitis is inflammation of the bronchial tubes, which carry air to the lungs. The tubes swell and produce mucus, or phlegm. The mucus and inflamed bronchial tubes make you cough. You may have trouble breathing. Most cases of bronchitis are caused by viruses like those that cause colds. Antibiotics usually do not help and they may be harmful. Bronchitis usually develops rapidly and lasts about 2 to 3 weeks in otherwise healthy people. Follow-up care is a key part of your treatment and safety. Be sure to make and go to all appointments, and call your doctor if you are having problems. It's also a good idea to know your test results and keep a list of the medicines you take. How can you care for yourself at home? · Take all medicines exactly as prescribed. Call your doctor if you think you are having a problem with your medicine. · Get some extra rest.  · Take an over-the-counter pain medicine, such as acetaminophen (Tylenol), ibuprofen (Advil, Motrin), or naproxen (Aleve) to reduce fever and relieve body aches. Read and follow all instructions on the label. · Do not take two or more pain medicines at the same time unless the doctor told you to. Many pain medicines have acetaminophen, which is Tylenol. Too much acetaminophen (Tylenol) can be harmful. · Take an over-the-counter cough medicine that contains dextromethorphan to help quiet a dry, hacking cough so that you can sleep. Avoid cough medicines that have more than one active ingredient. Read and follow all instructions on the label. · Breathe moist air from a humidifier, hot shower, or sink filled with hot water. The heat and moisture will thin mucus so you can cough it out. · Do not smoke. Smoking can make bronchitis worse. If you need help quitting, talk to your doctor about stop-smoking programs and medicines. These can increase your chances of quitting for good.   When should you call for help? Call 911 anytime you think you may need emergency care. For example, call if:  ? · You have severe trouble breathing. ?Call your doctor now or seek immediate medical care if:  ? · You have new or worse trouble breathing. ? · You cough up dark brown or bloody mucus (sputum). ? · You have a new or higher fever. ? · You have a new rash. ? Watch closely for changes in your health, and be sure to contact your doctor if:  ? · You cough more deeply or more often, especially if you notice more mucus or a change in the color of your mucus. ? · You are not getting better as expected. Where can you learn more? Go to http://royal-maeve.info/. Enter H333 in the search box to learn more about \"Bronchitis: Care Instructions. \"  Current as of: May 12, 2017  Content Version: 11.4  © 4817-5523 [employer]. Care instructions adapted under license by [employer] (which disclaims liability or warranty for this information). If you have questions about a medical condition or this instruction, always ask your healthcare professional. [address] any warranty or liability for your use of this information.

## 2018-01-05 ENCOUNTER — OFFICE VISIT (OUTPATIENT)
Dept: FAMILY MEDICINE CLINIC | Age: 63
End: 2018-01-05

## 2018-01-05 VITALS
OXYGEN SATURATION: 97 % | BODY MASS INDEX: 41.78 KG/M2 | HEIGHT: 66 IN | DIASTOLIC BLOOD PRESSURE: 65 MMHG | WEIGHT: 260 LBS | HEART RATE: 85 BPM | RESPIRATION RATE: 18 BRPM | TEMPERATURE: 97 F | SYSTOLIC BLOOD PRESSURE: 126 MMHG

## 2018-01-05 DIAGNOSIS — J20.9 BRONCHITIS WITH BRONCHOSPASM: Primary | ICD-10-CM

## 2018-01-05 RX ORDER — PROMETHAZINE HYDROCHLORIDE AND CODEINE PHOSPHATE 6.25; 1 MG/5ML; MG/5ML
1 SOLUTION ORAL
Qty: 240 ML | Refills: 0 | Status: SHIPPED | OUTPATIENT
Start: 2018-01-05 | End: 2018-09-23

## 2018-01-05 RX ORDER — ALBUTEROL SULFATE 90 UG/1
2 AEROSOL, METERED RESPIRATORY (INHALATION)
Qty: 1 INHALER | Refills: 3 | Status: SHIPPED | OUTPATIENT
Start: 2018-01-05 | End: 2020-08-30 | Stop reason: SDUPTHER

## 2018-01-05 RX ORDER — AZITHROMYCIN 250 MG/1
TABLET, FILM COATED ORAL
Qty: 6 TAB | Refills: 0 | Status: SHIPPED | OUTPATIENT
Start: 2018-01-05 | End: 2018-01-15 | Stop reason: ALTCHOICE

## 2018-01-05 RX ORDER — ALBUTEROL SULFATE 90 UG/1
2 AEROSOL, METERED RESPIRATORY (INHALATION)
Qty: 1 INHALER | Refills: 3 | Status: SHIPPED | OUTPATIENT
Start: 2018-01-05 | End: 2018-01-05 | Stop reason: SDUPTHER

## 2018-01-05 NOTE — PROGRESS NOTES
Chief Complaint   Patient presents with    Sinus Infection       Visit Vitals    /65 (BP 1 Location: Left leg, BP Patient Position: Sitting)    Pulse 84    Temp 97 °F (36.1 °C) (Oral)    Resp 18    Ht 5' 6\" (1.676 m)    Wt 260 lb (117.9 kg)    SpO2 92%    BMI 41.97 kg/m2     1. Have you been to the ER, urgent care clinic since your last visit? Hospitalized since your last visit? No  2. Have you seen or consulted any other health care providers outside of the [address] [place] [address] since your last visit? Include any pap smears or colon screening.  No

## 2018-01-05 NOTE — MR AVS SNAPSHOT
Visit Information Date & Time Provider Department Dept. Phone Encounter #  
 1/5/2018 11:00 AM Mario Luna Canelo [place] [phone] [id number] Follow-up Instructions Return if symptoms worsen or fail to improve. Your Appointments 1/15/2018  9:15 AM  
COMPLETE PHYSICAL with Mario Luna MD  
[facility] Appt Note: cpe  
 [address] [facility] [place] [facility] [address]  
[phone] [address] [facility] [address] Upcoming Health Maintenance Date Due Influenza Age 5 to Adult 8/1/2017 HEMOGLOBIN A1C Q6M 12/19/2017 LIPID PANEL Q1 6/19/2018 PAP AKA CERVICAL CYTOLOGY 2/5/2019 BREAST CANCER SCRN MAMMOGRAM 9/7/2019 COLONOSCOPY 4/2/2025 DTaP/Tdap/Td series (2 - Td) 6/19/2027 Allergies as of 1/5/2018  Review Complete On: 1/5/2018 By: Mario Luna MD  
  
 Severity Noted Reaction Type Reactions Ciprofloxacin  08/30/2016    Other (comments) Insomnia and hot flashes Demerol [Meperidine]  06/14/2010    Other (comments)  
 hallucinations Doxycycline  06/14/2010    Unknown (comments) \"Feeling flushed\" Floxin [Ofloxacin]  06/14/2010    Other (comments)  
 insomnia Lisinopril  08/11/2017    Cough Prednisone  10/25/2017    Other (comments) Has feelings of depression, \"weird thoughts\" Current Immunizations  Reviewed on 1/5/2018 Name Date Influenza Vaccine 11/19/2014, 10/15/2013 Influenza Vaccine (Quad) PF 12/5/2016 Pneumococcal Polysaccharide (PPSV-23) 12/5/2016 Tdap 6/19/2017 Reviewed by Margarette Busby LPN on 1/4/2671 at 26:61 AM  
 Reviewed by Margarette Busby LPN on 3/3/7701 at 16:36 AM  
You Were Diagnosed With   
  
 Codes Comments Bronchitis with bronchospasm    -  Primary ICD-10-CM: J20.9 ICD-9-CM: 444 Vitals BP Pulse Temp Resp Height(growth percentile) Weight(growth percentile) 126/65 (BP 1 Location: Left leg, BP Patient Position: Sitting) 85 97 °F (36.1 °C) (Oral) 18 5' 6\" (1.676 m) 260 lb (117.9 kg) LMP SpO2 BMI OB Status Smoking Status 11/19/1998 97% 41.97 kg/m2 Hysterectomy Never Smoker Vitals History BMI and BSA Data Body Mass Index Body Surface Area 41.97 kg/m 2 2.34 m 2 Preferred Pharmacy Pharmacy Name Phone Amena Mejia, [address] [facility] [phone] Your Updated Medication List  
  
   
This list is accurate as of: 1/5/18 11:56 AM.  Always use your most recent med list.  
  
  
  
  
 * albuterol 90 mcg/actuation inhaler Commonly known as:  PROVENTIL HFA, VENTOLIN HFA, PROAIR HFA Take 2 Puffs by inhalation every four (4) hours as needed for Wheezing. * albuterol 90 mcg/actuation inhaler Commonly known as:  PROAIR HFA Take 2 Puffs by inhalation every four (4) hours as needed for Wheezing or Shortness of Breath. ALPRAZolam 0.5 mg tablet Commonly known as:  Doyal Garden Take 1 Tab by mouth nightly as needed. aspirin 81 mg chewable tablet Take 81 mg by mouth daily. azithromycin 250 mg tablet Commonly known as:  Laymond Grumet Take 2 tablets today, then take 1 tablet daily CALTRATE PLUS PO Take 1 Tab by mouth daily. cholecalciferol (VITAMIN D3) 5,000 unit Tab tablet Commonly known as:  VITAMIN D3 Take 5,000 Units by mouth daily. citalopram 20 mg tablet Commonly known as:  CELEXA  
TAKE ONE TABLET BY MOUTH DAILY  
  
 elderberry fruit-honey 0.7-3 gram/7.5 mL Liqd Take  by mouth. fluticasone 50 mcg/actuation nasal spray Commonly known as:  Rudy Gordon Place 2 sprays in each nostril once daily. furosemide 20 mg tablet Commonly known as:  LASIX TAKE ONE TABLET BY MOUTH DAILY  
  
 levocetirizine 5 mg tablet Commonly known as:  Pancho Kyle  
 TAKE ONE TABLET BY MOUTH DAILY losartan-hydroCHLOROthiazide 50-12.5 mg per tablet Commonly known as:  HYZAAR Take 1 Tab by mouth daily. melatonin 3 mg tablet Take  by mouth. MULTIVITAMIN PO Take 1 tablet by mouth daily. omeprazole 40 mg capsule Commonly known as:  PRILOSEC  
TAKE ONE CAPSULE BY MOUTH DAILY pravastatin 20 mg tablet Commonly known as:  PRAVACHOL  
TAKE ONE TABLET BY MOUTH EVERY NIGHT AT BEDTIME  
  
 promethazine-codeine 6.25-10 mg/5 mL syrup Commonly known as:  PHENERGAN with CODEINE Take 5 mL by mouth every six (6) hours as needed for Cough. Max Daily Amount: 20 mL. TYLENOL ARTHRITIS PAIN 650 mg Enid Homans Generic drug:  acetaminophen Take 1,300 mg by mouth every six (6) hours as needed for Pain. * Notice: This list has 2 medication(s) that are the same as other medications prescribed for you. Read the directions carefully, and ask your doctor or other care provider to review them with you. Prescriptions Printed Refills  
 promethazine-codeine (PHENERGAN WITH CODEINE) 6.25-10 mg/5 mL syrup 0 Sig: Take 5 mL by mouth every six (6) hours as needed for Cough. Max Daily Amount: 20 mL. Class: Print Route: Oral  
  
Prescriptions Sent to Pharmacy Refills  
 albuterol (PROAIR HFA) 90 mcg/actuation inhaler 3 Sig: Take 2 Puffs by inhalation every four (4) hours as needed for Wheezing or Shortness of Breath. Class: Normal  
 Pharmacy: [address] Ph #: [phone] Route: Inhalation  
 azithromycin (ZITHROMAX) 250 mg tablet 0 Sig: Take 2 tablets today, then take 1 tablet daily Class: Normal  
 Pharmacy: [address] Ph #: [phone] Follow-up Instructions Return if symptoms worsen or fail to improve. To-Do List   
 01/05/2018 Imaging:  XR CHEST PA LAT Introducing [facility]! Dear Katie Whipple: 
Thank you for requesting a Nuzzel account. Our records indicate that you already have an active Nuzzel account. You can access your account anytime at [email or web address] Did you know that you can access your hospital and ER discharge instructions at any time in Nuzzel? You can also review all of your test results from your hospital stay or ER visit. Additional Information If you have questions, please visit the Frequently Asked Questions section of the Nuzzel website at [email or web address] Remember, Nuzzel is NOT to be used for urgent needs. For medical emergencies, dial 911. Now available from your iPhone and Android! Please provide this summary of care documentation to your next provider. Your primary care clinician is listed as Verlie Boas. If you have any questions after today's visit, please call [phone].

## 2018-01-05 NOTE — PROGRESS NOTES
HISTORY OF PRESENT ILLNESS  Rene Robles is a 60 y.o. female. HPI   C/o nasal congestion, headache and pressure in the face and cough for the past 1 week. Coughing up thick yellowish phlegm. No fever or chills noted. Has malaise. Throat is scratchy. Has post nasal drainage. No chest pain or SOB. Has been wheezing also which comes and goes. Increase cough at night. Pt does not smoke. Reviewed PmHx, RxHx, FmHx, SocHx, AllgHx and updated and dated in the chart. ROS    Physical Exam   Constitutional: She appears well-developed and well-nourished. /65 (BP 1 Location: Left leg, BP Patient Position: Sitting)  Pulse 85  Temp 97 °F (36.1 °C) (Oral)   Resp 18  Ht 5' 6\" (1.676 m)  Wt 260 lb (117.9 kg)  LMP 11/19/1998  SpO2 97%  BMI 41.97 kg/m2     HENT:   Right Ear: Tympanic membrane and ear canal normal.   Left Ear: Tympanic membrane and ear canal normal.   Nose: Mucosal edema and rhinorrhea present. Mouth/Throat: Mucous membranes are normal. Posterior oropharyngeal erythema present. No oropharyngeal exudate. Neck: Trachea normal, normal range of motion and full passive range of motion without pain. Neck supple. No edema present. No thyromegaly present. Cardiovascular: Normal rate and regular rhythm. Pulmonary/Chest: Effort normal. She has wheezes (faint). She has rhonchi. She has no rales. Abdominal: Soft. Normal appearance and bowel sounds are normal. There is no tenderness. Lymphadenopathy:     She has no cervical adenopathy. Vitals reviewed. ASSESSMENT and PLAN  Diagnoses and all orders for this visit:    1. Bronchitis with bronchospasm  -     XR CHEST PA LAT; Future  -     albuterol (PROAIR HFA) 90 mcg/actuation inhaler; Take 2 Puffs by inhalation every four (4) hours as needed for Wheezing or Shortness of Breath.  -     azithromycin (ZITHROMAX) 250 mg tablet;  Take 2 tablets today, then take 1 tablet daily  -     promethazine-codeine (PHENERGAN WITH CODEINE) 6.25-10 mg/5 mL syrup; Take 5 mL by mouth every six (6) hours as needed for Cough. Max Daily Amount: 20 mL. Follow-up Disposition:  Return if symptoms worsen or fail to improve. reviewed medications and side effects in detail    I have discussed diagnosis listed in this note with pt and/or family. I have discussed treatment plans and options and the risk/benefit analysis of those options, including safe use of medications and possible medication side effects. Through the use of shared decision making we have agreed to the above plan. The patient has received an after-visit summary and questions were answered concerning future plans and follow up. Advise pt of any urgent changes then to proceed to the ER.

## 2018-01-15 ENCOUNTER — OFFICE VISIT (OUTPATIENT)
Dept: FAMILY MEDICINE CLINIC | Age: 63
End: 2018-01-15

## 2018-01-15 VITALS
WEIGHT: 256.2 LBS | TEMPERATURE: 97.3 F | OXYGEN SATURATION: 98 % | RESPIRATION RATE: 20 BRPM | SYSTOLIC BLOOD PRESSURE: 140 MMHG | DIASTOLIC BLOOD PRESSURE: 86 MMHG | HEIGHT: 66 IN | HEART RATE: 74 BPM | BODY MASS INDEX: 41.17 KG/M2

## 2018-01-15 DIAGNOSIS — I10 ESSENTIAL HYPERTENSION: ICD-10-CM

## 2018-01-15 DIAGNOSIS — Z51.81 ENCOUNTER FOR MEDICATION MONITORING: ICD-10-CM

## 2018-01-15 DIAGNOSIS — Z23 ENCOUNTER FOR IMMUNIZATION: ICD-10-CM

## 2018-01-15 DIAGNOSIS — F32.A DEPRESSION, UNSPECIFIED DEPRESSION TYPE: ICD-10-CM

## 2018-01-15 DIAGNOSIS — E55.9 HYPOVITAMINOSIS D: ICD-10-CM

## 2018-01-15 DIAGNOSIS — Z00.00 ROUTINE GENERAL MEDICAL EXAMINATION AT A HEALTH CARE FACILITY: Primary | ICD-10-CM

## 2018-01-15 DIAGNOSIS — Z23 NEED FOR SHINGLES VACCINE: ICD-10-CM

## 2018-01-15 DIAGNOSIS — E11.9 DIET-CONTROLLED DIABETES MELLITUS (HCC): ICD-10-CM

## 2018-01-15 DIAGNOSIS — E78.2 MIXED HYPERLIPIDEMIA: ICD-10-CM

## 2018-01-15 PROBLEM — E66.01 OBESITY, MORBID (HCC): Status: ACTIVE | Noted: 2018-01-15

## 2018-01-15 LAB
BILIRUB UR QL STRIP: NEGATIVE
GLUCOSE UR-MCNC: NEGATIVE MG/DL
HBA1C MFR BLD HPLC: 6.2 %
KETONES P FAST UR STRIP-MCNC: NEGATIVE MG/DL
PH UR STRIP: 7 [PH] (ref 4.6–8)
PROT UR QL STRIP: NEGATIVE
SP GR UR STRIP: 1.01 (ref 1–1.03)
UA UROBILINOGEN AMB POC: NORMAL (ref 0.2–1)
URINALYSIS CLARITY POC: CLEAR
URINALYSIS COLOR POC: YELLOW
URINE BLOOD POC: NORMAL
URINE LEUKOCYTES POC: NORMAL
URINE NITRITES POC: NEGATIVE

## 2018-01-15 NOTE — MR AVS SNAPSHOT
Visit Information Date & Time Provider Department Dept. Phone Encounter #  
 1/15/2018  9:15 AM Sterling LaraCanelo [phone] [id number] Follow-up Instructions Return in about 4 months (around 5/15/2018). Upcoming Health Maintenance Date Due Influenza Age 5 to Adult 8/1/2017 HEMOGLOBIN A1C Q6M 12/19/2017 LIPID PANEL Q1 6/19/2018 PAP AKA CERVICAL CYTOLOGY 2/5/2019 BREAST CANCER SCRN MAMMOGRAM 9/7/2019 COLONOSCOPY 4/2/2025 DTaP/Tdap/Td series (2 - Td) 6/19/2027 Allergies as of 1/15/2018  Review Complete On: 1/15/2018 By: Sterling Lara MD  
  
 Severity Noted Reaction Type Reactions Ciprofloxacin  08/30/2016    Other (comments) Insomnia and hot flashes Demerol [Meperidine]  06/14/2010    Other (comments)  
 hallucinations Doxycycline  06/14/2010    Unknown (comments) \"Feeling flushed\" Floxin [Ofloxacin]  06/14/2010    Other (comments)  
 insomnia Lisinopril  08/11/2017    Cough Phenergan-codeine [Promethazine-codeine]  01/15/2018    Itching Patient stated she started itching when she was tapering off of medication. Prednisone  10/25/2017    Other (comments) Has feelings of depression, \"weird thoughts\" Current Immunizations  Reviewed on 1/5/2018 Name Date Influenza Vaccine 11/19/2014, 10/15/2013 Influenza Vaccine (Quad) PF 12/5/2016 Pneumococcal Polysaccharide (PPSV-23) 12/5/2016 Tdap 6/19/2017 Not reviewed this visit You Were Diagnosed With   
  
 Codes Comments Routine general medical examination at a health care facility    -  Primary ICD-10-CM: Z00.00 ICD-9-CM: V70.0 Essential hypertension     ICD-10-CM: I10 
ICD-9-CM: 401.9 Diet-controlled diabetes mellitus ([facility] [address]     ICD-10-CM: E11.9 ICD-9-CM: 250.00 Mixed hyperlipidemia     ICD-10-CM: E78.2 ICD-9-CM: 272.2 Hypovitaminosis D     ICD-10-CM: E55.9 ICD-9-CM: 268.9 Depression, unspecified depression type     ICD-10-CM: F32.9 ICD-9-CM: 226 Encounter for medication monitoring     ICD-10-CM: Z51.81 
ICD-9-CM: V58.83 Vitals BP Pulse Temp Resp Height(growth percentile) Weight(growth percentile) 140/86 74 97.3 °F (36.3 °C) (Oral) 20 5' 6\" (1.676 m) 256 lb 3.2 oz (116.2 kg) LMP SpO2 PF BMI OB Status Smoking Status 11/19/1998 98% 270 L/min 41.35 kg/m2 Hysterectomy Never Smoker Vitals History BMI and BSA Data Body Mass Index Body Surface Area  
 41.35 kg/m 2 2.33 m 2 Preferred Pharmacy Pharmacy Name Phone Francisco J Hernandez [address] , [address] [place] [address] [phone] Your Updated Medication List  
  
   
This list is accurate as of: 1/15/18 10:51 AM.  Always use your most recent med list.  
  
  
  
  
 * albuterol 90 mcg/actuation inhaler Commonly known as:  PROVENTIL HFA, VENTOLIN HFA, PROAIR HFA Take 2 Puffs by inhalation every four (4) hours as needed for Wheezing. * albuterol 90 mcg/actuation inhaler Commonly known as:  PROAIR HFA Take 2 Puffs by inhalation every four (4) hours as needed for Wheezing or Shortness of Breath. Please instruct Pt on how to use an inhaler. ALPRAZolam 0.5 mg tablet Commonly known as:  Aloma Gaudy Take 1 Tab by mouth nightly as needed. aspirin 81 mg chewable tablet Take 81 mg by mouth daily. CALTRATE PLUS PO Take 1 Tab by mouth daily. cholecalciferol (VITAMIN D3) 5,000 unit Tab tablet Commonly known as:  VITAMIN D3 Take 5,000 Units by mouth daily. citalopram 20 mg tablet Commonly known as:  CELEXA  
TAKE ONE TABLET BY MOUTH DAILY  
  
 fluticasone 50 mcg/actuation nasal spray Commonly known as:  Pina Yanes Place 2 sprays in each nostril once daily. furosemide 20 mg tablet Commonly known as:  LASIX TAKE ONE TABLET BY MOUTH DAILY  
  
 levocetirizine 5 mg tablet Commonly known as:  Efrem Camuy TAKE ONE TABLET BY MOUTH DAILY losartan-hydroCHLOROthiazide 50-12.5 mg per tablet Commonly known as:  HYZAAR Take 1 Tab by mouth daily. melatonin 3 mg tablet Take  by mouth. MULTIVITAMIN PO Take 1 tablet by mouth daily. omeprazole 40 mg capsule Commonly known as:  PRILOSEC  
TAKE ONE CAPSULE BY MOUTH DAILY pravastatin 20 mg tablet Commonly known as:  PRAVACHOL  
TAKE ONE TABLET BY MOUTH EVERY NIGHT AT BEDTIME  
  
 promethazine-codeine 6.25-10 mg/5 mL syrup Commonly known as:  PHENERGAN with CODEINE Take 5 mL by mouth every six (6) hours as needed for Cough. Max Daily Amount: 20 mL. TYLENOL ARTHRITIS PAIN 650 mg Marianna Denton Generic drug:  acetaminophen Take 1,300 mg by mouth every six (6) hours as needed for Pain.  
  
 varicella-zoster recombinant (PF) 50 mcg/0.5 mL Susr injection Commonly known as:  SHINGRIX  
0.5 mL by IntraMUSCular route once for 1 dose. Repeat second dose in 6 months * Notice: This list has 2 medication(s) that are the same as other medications prescribed for you. Read the directions carefully, and ask your doctor or other care provider to review them with you. Prescriptions Printed Refills  
 varicella-zoster recombinant, PF, (SHINGRIX) 50 mcg/0.5 mL susr injection 0 Si.5 mL by IntraMUSCular route once for 1 dose. Repeat second dose in 6 months Class: Print Route: IntraMUSCular We Performed the Following AMB POC HEMOGLOBIN A1C [19756 CPT(R)] AMB POC URINALYSIS DIP STICK AUTO W/ MICRO [81862 CPT(R)] CBC W/O DIFF [36492 CPT(R)] LIPID PANEL [18990 CPT(R)] METABOLIC PANEL, COMPREHENSIVE [05577 CPT(R)] VITAMIN D, 25 HYDROXY Q2201077 CPT(R)] Follow-up Instructions Return in about 4 months (around 5/15/2018). Introducing [facility]! Dear Rosalio Sayer: 
Thank you for requesting a Tavern account. Our records indicate that you already have an active Tavern account.   You can access your account anytime at [email or web address] Did you know that you can access your hospital and ER discharge instructions at any time in Theatro? You can also review all of your test results from your hospital stay or ER visit. Additional Information If you have questions, please visit the Frequently Asked Questions section of the Theatro website at [email or web address] Remember, Theatro is NOT to be used for urgent needs. For medical emergencies, dial 911. Now available from your iPhone and Android! Please provide this summary of care documentation to your next provider. Your primary care clinician is listed as Alex Hansen. If you have any questions after today's visit, please call [phone].

## 2018-01-15 NOTE — PROGRESS NOTES
Subjective:   60 y.o. female for annual routine Pap and checkup. Patient's last menstrual period was 11/19/1998. Cardiovascular Review:  The patient has hypertension and hyperlipidemia. Diet and Lifestyle: generally follows a low fat low cholesterol diet, generally follows a low sodium diet, follows a diabetic diet regularly, exercises sporadically  Home BP Monitoring: is not measured at home. Pertinent ROS: taking medications as instructed, no medication side effects noted, no TIA's, no chest pain on exertion, no dyspnea on exertion, no swelling of ankles. C/o loud snoring and suspects that she has sleep apnea. DM type II follow up:  Diet controlled. Compliant w/ diabetic diet, and exercise. Obtains home glucose monitoring averaging 100-140. Checks BS BID on most days and prn. Pt does not have BS log at visit today. No Rf needed for today. Denies any tingling sensation, polyuria and polydipsia. No blurred vision. No significant weight changes. Feeling well since last OV. Depression Review:  Patient is seen for followup of depression. Treatment includes Celexa. Ongoing symptoms include fatigue. She denies depressed mood, insomnia and hopelessness. She experiences the following side effects from the treatment: none. Patient Active Problem List   Diagnosis Code    Environmental allergies Z91.09    Depression F32.9    C section     H/O: hysterectomy Z90.710    Hypovitaminosis D E55.9    Mixed hyperlipidemia E78.2    Benign essential hematuria R31.9    Diet-controlled diabetes mellitus ([facility] [address] E11.9    Hypertensive retinopathy H35.039    Encounter for medication monitoring Z51.81    Essential hypertension I10    At high risk for breast cancer Z91.89    Obesity, morbid ([facility] [address] E66.01       Current Outpatient Prescriptions   Medication Sig Dispense Refill    varicella-zoster recombinant, PF, (SHINGRIX) 50 mcg/0.5 mL susr injection 0.5 mL by IntraMUSCular route once for 1 dose.  Repeat second dose in 6 months 0.5 mL 0    promethazine-codeine (PHENERGAN WITH CODEINE) 6.25-10 mg/5 mL syrup Take 5 mL by mouth every six (6) hours as needed for Cough. Max Daily Amount: 20 mL. 240 mL 0    albuterol (PROAIR HFA) 90 mcg/actuation inhaler Take 2 Puffs by inhalation every four (4) hours as needed for Wheezing or Shortness of Breath. Please instruct Pt on how to use an inhaler. 1 Inhaler 3    albuterol (PROVENTIL HFA, VENTOLIN HFA, PROAIR HFA) 90 mcg/actuation inhaler Take 2 Puffs by inhalation every four (4) hours as needed for Wheezing. 1 Inhaler 0    furosemide (LASIX) 20 mg tablet TAKE ONE TABLET BY MOUTH DAILY 90 Tab 1    melatonin 3 mg tablet Take  by mouth.  levocetirizine (XYZAL) 5 mg tablet TAKE ONE TABLET BY MOUTH DAILY 30 Tab 6    omeprazole (PRILOSEC) 40 mg capsule TAKE ONE CAPSULE BY MOUTH DAILY 90 Cap 3    losartan-hydroCHLOROthiazide (HYZAAR) 50-12.5 mg per tablet Take 1 Tab by mouth daily. 90 Tab 3    acetaminophen (TYLENOL ARTHRITIS PAIN) 650 mg CR tablet Take 1,300 mg by mouth every six (6) hours as needed for Pain.  fluticasone (FLONASE) 50 mcg/actuation nasal spray Place 2 sprays in each nostril once daily. 1 Bottle 11    ALPRAZolam (XANAX) 0.5 mg tablet Take 1 Tab by mouth nightly as needed. 90 Tab 1    citalopram (CELEXA) 20 mg tablet TAKE ONE TABLET BY MOUTH DAILY 90 Tab 3    pravastatin (PRAVACHOL) 20 mg tablet TAKE ONE TABLET BY MOUTH EVERY NIGHT AT BEDTIME 90 tablet 1    MULTIVITAMIN PO Take 1 tablet by mouth daily.  cholecalciferol, VITAMIN D3, (VITAMIN D3) 5,000 unit tab tablet Take 5,000 Units by mouth daily.  aspirin 81 mg chewable tablet Take 81 mg by mouth daily.  CALCIUM CARB/VIT D3/MINERALS (CALTRATE PLUS PO) Take 1 Tab by mouth daily.          Allergies   Allergen Reactions    Ciprofloxacin Other (comments)     Insomnia and hot flashes    Demerol [Meperidine] Other (comments)     hallucinations    Doxycycline Unknown (comments) \"Feeling flushed\"    Floxin [Ofloxacin] Other (comments)     insomnia    Lisinopril Cough    Phenergan-Codeine [Promethazine-Codeine] Itching     Patient stated she started itching when she was tapering off of medication.     Prednisone Other (comments)     Has feelings of depression, \"weird thoughts\"         Past Medical History:   Diagnosis Date    C section 3/12/2010    Depression 3/12/2010    Diabetes ([address]     Environmental allergies 3/12/2010    H/O: hysterectomy 3/12/2010    High cholesterol 3/12/2010    HTN (hypertension) 3/12/2010    Pneumonia 2017         Past Surgical History:   Procedure Laterality Date    ENDOSCOPY, COLON, DIAGNOSTIC      repeat 10 yrs (Dr. 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-     HX CYSTOSTOMY  3/2015    HX HYSTERECTOMY  1998         Family History   Problem Relation Age of Onset    Hypertension Mother     Other Mother      obesity, chf    Heart Disease Mother      CHF    Kidney Disease Mother     High Cholesterol Mother     Cancer Father      lung    Breast Cancer Sister     MS Sister     High Cholesterol Sister     Hypertension Sister     Hypertension Brother     Liver Disease Brother      Hep C    High Cholesterol Brother     Cancer Brother      colon    Hypertension Sister     Diabetes Sister     High Cholesterol Sister     Hypertension Sister     Other Sister      obese    High Cholesterol Sister        Social History   Substance Use Topics    Smoking status: Never Smoker    Smokeless tobacco: Never Used    Alcohol use 0.0 oz/week     0 Standard drinks or equivalent per week      Comment: occasional     Lab Results   Component Value Date/Time    WBC 8.9 2017 12:32 PM    Hemoglobin (POC) 15.0 2016 05:26 PM    HGB 14.2 2017 12:32 PM    Hematocrit (POC) 44 09/29/2016 05:26 PM    HCT 42.7 03/13/2017 12:32 PM    PLATELET 415 56/12/4203 12:32 PM    MCV 88 03/13/2017 12:32 PM       Lab Results   Component Value Date/Time    Cholesterol, total 202 06/19/2017 12:10 PM    HDL Cholesterol 41 06/19/2017 12:10 PM    LDL, calculated 132 06/19/2017 12:10 PM    Triglyceride 145 06/19/2017 12:10 PM    CHOL/HDL Ratio 4.9 09/13/2010 11:06 AM       Lab Results   Component Value Date/Time    TSH 1.430 03/22/2011 11:04 AM      Lab Results   Component Value Date/Time    Sodium 141 08/11/2017 10:09 AM    Potassium 4.1 08/11/2017 10:09 AM    Chloride 97 08/11/2017 10:09 AM    CO2 27 08/11/2017 10:09 AM    Anion gap 5 03/11/2015 01:35 PM    Glucose 105 08/11/2017 10:09 AM    BUN 13 08/11/2017 10:09 AM    Creatinine 0.82 08/11/2017 10:09 AM    BUN/Creatinine ratio 16 08/11/2017 10:09 AM    GFR est AA 88 08/11/2017 10:09 AM    GFR est non-AA 76 08/11/2017 10:09 AM    Calcium 9.7 08/11/2017 10:09 AM    Bilirubin, total 0.2 08/11/2017 10:09 AM    ALT (SGPT) 9 08/11/2017 10:09 AM    AST (SGOT) 9 08/11/2017 10:09 AM    Alk. phosphatase 70 08/11/2017 10:09 AM    Protein, total 6.8 08/11/2017 10:09 AM    Albumin 4.6 08/11/2017 10:09 AM    Globulin 3.5 03/11/2015 01:35 PM    A-G Ratio 2.1 08/11/2017 10:09 AM      Lab Results   Component Value Date/Time    Hemoglobin A1c 6.7 10/15/2013 12:55 PM    Hemoglobin A1c (POC) 6.2 01/15/2018 10:35 AM            ROS:  Feeling well. No dyspnea or chest pain on exertion. No abdominal pain, change in bowel habits, black or bloody stools. No urinary tract symptoms. GYN ROS: no breast pain or new or enlarging lumps on self exam.  No neurological complaints. Objective:     Visit Vitals    /86    Pulse 74    Temp 97.3 °F (36.3 °C) (Oral)    Resp 20    Ht 5' 6\" (1.676 m)    Wt 256 lb 3.2 oz (116.2 kg)    LMP 11/19/1998    SpO2 98%     L/min    BMI 41.35 kg/m2     The patient appears well, alert, oriented x 3, in no distress.   ENT normal. Neck supple. No adenopathy or thyromegaly. CYNDI. Lungs are clear, good air entry, no wheezes, rhonchi or rales. S1 and S2 normal, no murmurs, regular rate and rhythm. Abdomen soft without tenderness, guarding, mass or organomegaly. Extremities show no edema, normal peripheral pulses. Neurological is normal, no focal findings. BREAST EXAM: breasts appear normal, no suspicious masses, no skin or nipple changes or axillary nodes    PELVIC EXAM: RECTAL: normal rectal, no masses, guaiac negative stool obtained,    Assessment/Plan:   well woman  mammogram  pap smear  counseled on breast self exam, mammography screening, menopause, osteoporosis and adequate intake of calcium and vitamin D  additional lab tests per orders  Unique Weems was seen today for complete physical.    ASSESSMENT and PLAN  Diagnoses and all orders for this visit:    1. Routine general medical examination at a health care facility    2. Essential hypertension  Discussed sodium restriction, high k rich diet, maintaining ideal body weight and regular exercise program such as daily walking 30 min perday 4-5 times per week, as physiologic means to achieve blood pressure control.  Medication compliance advised. 3. Diet-controlled diabetes mellitus ([facility] [address]  -     AMB POC HEMOGLOBIN A1C  Continue to monitor. Work on diet, weight reduction and exercise. 4. Mixed hyperlipidemia  -     LIPID PANEL    5. Hypovitaminosis D  -     VITAMIN D, 25 HYDROXY    6. Depression, unspecified depression type  Stable on celexa. 7. Encounter for medication monitoring  -     METABOLIC PANEL, COMPREHENSIVE  -     CBC W/O DIFF  -     AMB POC URINALYSIS DIP STICK AUTO W/ MICRO    8. Encounter for immunization  -     Influenza virus vaccine (QUADRIVALENT PRES FREE SYRINGE) IM (02120)  -     AR IMMUNIZ ADMIN,1 SINGLE/COMB VAC/TOXOID    9.  Need for shingles vaccine  -     varicella-zoster recombinant, PF, (SHINGRIX) 50 mcg/0.5 mL susr injection; 0.5 mL by IntraMUSCular route once for 1 dose. Repeat second dose in 6 months      Follow-up Disposition:  Return in about 4 months (around 5/15/2018). reviewed diet, exercise and weight control  cardiovascular risk and specific lipid/LDL goals reviewed  reviewed medications and side effects in detail  specific diabetic recommendations: low cholesterol diet, weight control and daily exercise discussed and glycohemoglobin and other lab monitoring discussed     I have discussed diagnosis listed in this note with pt and/or family. I have discussed treatment plans and options and the risk/benefit analysis of those options, including safe use of medications and possible medication side effects. Through the use of shared decision making we have agreed to the above plan. The patient has received an after-visit summary and questions were answered concerning future plans and follow up. Advise pt of any urgent changes then to proceed to the ER.

## 2018-01-15 NOTE — PROGRESS NOTES
Chief Complaint   Patient presents with    Complete Physical     1. Have you been to the ER, urgent care clinic since your last visit? Hospitalized since your last visit? NO    2. Have you seen or consulted any other health care providers outside of the [address] [place] [address] since your last visit? Include any pap smears or colon screening.  Yes Dr Bijan Groves [id number]/4525 [facility] for right breast.

## 2018-01-16 LAB
25(OH)D3+25(OH)D2 SERPL-MCNC: 44.2 NG/ML (ref 30–100)
ALBUMIN SERPL-MCNC: 4.3 G/DL (ref 3.6–4.8)
ALBUMIN/GLOB SERPL: 2 {RATIO} (ref 1.2–2.2)
ALP SERPL-CCNC: 73 IU/L (ref 39–117)
ALT SERPL-CCNC: 11 IU/L (ref 0–32)
AST SERPL-CCNC: 14 IU/L (ref 0–40)
BILIRUB SERPL-MCNC: 0.4 MG/DL (ref 0–1.2)
BUN SERPL-MCNC: 13 MG/DL (ref 8–27)
BUN/CREAT SERPL: 16 (ref 12–28)
CALCIUM SERPL-MCNC: 9.6 MG/DL (ref 8.7–10.3)
CHLORIDE SERPL-SCNC: 99 MMOL/L (ref 96–106)
CHOLEST SERPL-MCNC: 175 MG/DL (ref 100–199)
CO2 SERPL-SCNC: 29 MMOL/L (ref 18–29)
CREAT SERPL-MCNC: 0.79 MG/DL (ref 0.57–1)
ERYTHROCYTE [DISTWIDTH] IN BLOOD BY AUTOMATED COUNT: 14.2 % (ref 12.3–15.4)
GLOBULIN SER CALC-MCNC: 2.2 G/DL (ref 1.5–4.5)
GLUCOSE SERPL-MCNC: 102 MG/DL (ref 65–99)
HCT VFR BLD AUTO: 42.9 % (ref 34–46.6)
HDLC SERPL-MCNC: 41 MG/DL
HGB BLD-MCNC: 14.2 G/DL (ref 11.1–15.9)
INTERPRETATION, 910389: NORMAL
LDLC SERPL CALC-MCNC: 104 MG/DL (ref 0–99)
MCH RBC QN AUTO: 29.8 PG (ref 26.6–33)
MCHC RBC AUTO-ENTMCNC: 33.1 G/DL (ref 31.5–35.7)
MCV RBC AUTO: 90 FL (ref 79–97)
PLATELET # BLD AUTO: 317 X10E3/UL (ref 150–379)
POTASSIUM SERPL-SCNC: 4.2 MMOL/L (ref 3.5–5.2)
PROT SERPL-MCNC: 6.5 G/DL (ref 6–8.5)
RBC # BLD AUTO: 4.77 X10E6/UL (ref 3.77–5.28)
SODIUM SERPL-SCNC: 141 MMOL/L (ref 134–144)
TRIGL SERPL-MCNC: 151 MG/DL (ref 0–149)
VLDLC SERPL CALC-MCNC: 30 MG/DL (ref 5–40)
WBC # BLD AUTO: 12.7 X10E3/UL (ref 3.4–10.8)

## 2018-01-19 ENCOUNTER — DOCUMENTATION ONLY (OUTPATIENT)
Dept: SURGERY | Age: 63
End: 2018-01-19

## 2018-02-15 RX ORDER — PRAVASTATIN SODIUM 20 MG/1
TABLET ORAL
Qty: 90 TAB | Refills: 2 | Status: SHIPPED | OUTPATIENT
Start: 2018-02-15 | End: 2018-11-17 | Stop reason: SDUPTHER

## 2018-02-20 DIAGNOSIS — F32.A DEPRESSION, UNSPECIFIED DEPRESSION TYPE: ICD-10-CM

## 2018-02-20 RX ORDER — ALPRAZOLAM 0.5 MG/1
TABLET ORAL
Qty: 90 TAB | Refills: 0 | OUTPATIENT
Start: 2018-02-20

## 2018-02-24 DIAGNOSIS — F32.A DEPRESSION, UNSPECIFIED DEPRESSION TYPE: ICD-10-CM

## 2018-02-26 RX ORDER — CITALOPRAM 20 MG/1
TABLET, FILM COATED ORAL
Qty: 90 TAB | Refills: 2 | Status: SHIPPED | OUTPATIENT
Start: 2018-02-26 | End: 2018-11-24 | Stop reason: SDUPTHER

## 2018-05-15 ENCOUNTER — OFFICE VISIT (OUTPATIENT)
Dept: FAMILY MEDICINE CLINIC | Age: 63
End: 2018-05-15

## 2018-05-15 VITALS
RESPIRATION RATE: 16 BRPM | HEART RATE: 71 BPM | HEIGHT: 66 IN | WEIGHT: 256.2 LBS | BODY MASS INDEX: 41.17 KG/M2 | DIASTOLIC BLOOD PRESSURE: 57 MMHG | TEMPERATURE: 97.6 F | OXYGEN SATURATION: 96 % | SYSTOLIC BLOOD PRESSURE: 121 MMHG

## 2018-05-15 DIAGNOSIS — Z91.09 ENVIRONMENTAL ALLERGIES: ICD-10-CM

## 2018-05-15 DIAGNOSIS — E11.9 DIET-CONTROLLED DIABETES MELLITUS (HCC): ICD-10-CM

## 2018-05-15 DIAGNOSIS — E78.2 MIXED HYPERLIPIDEMIA: ICD-10-CM

## 2018-05-15 DIAGNOSIS — Z51.81 ENCOUNTER FOR MEDICATION MONITORING: ICD-10-CM

## 2018-05-15 DIAGNOSIS — E66.01 OBESITY, MORBID (HCC): ICD-10-CM

## 2018-05-15 DIAGNOSIS — K21.9 GASTROESOPHAGEAL REFLUX DISEASE WITHOUT ESOPHAGITIS: ICD-10-CM

## 2018-05-15 DIAGNOSIS — R05.8 ALLERGIC COUGH: ICD-10-CM

## 2018-05-15 DIAGNOSIS — I10 BENIGN ESSENTIAL HTN: Primary | ICD-10-CM

## 2018-05-15 PROBLEM — Z80.0 FH: COLON CANCER: Status: ACTIVE | Noted: 2018-05-15

## 2018-05-15 LAB
GLUCOSE POC: 114 MG/DL
HBA1C MFR BLD HPLC: 6.1 %

## 2018-05-15 RX ORDER — LORATADINE 10 MG/1
10 TABLET ORAL
Qty: 30 TAB | Refills: 11
Start: 2018-05-15 | End: 2018-11-13 | Stop reason: ALTCHOICE

## 2018-05-15 RX ORDER — TOPIRAMATE 50 MG/1
50 TABLET, FILM COATED ORAL 2 TIMES DAILY
Qty: 60 TAB | Refills: 3 | Status: SHIPPED | OUTPATIENT
Start: 2018-05-15 | End: 2018-11-05 | Stop reason: SDUPTHER

## 2018-05-15 RX ORDER — LANOLIN ALCOHOL/MO/W.PET/CERES
1000 CREAM (GRAM) TOPICAL DAILY
COMMUNITY
End: 2018-11-13 | Stop reason: ALTCHOICE

## 2018-05-15 RX ORDER — RANITIDINE 300 MG/1
300 TABLET ORAL DAILY
Qty: 90 TAB | Refills: 3 | Status: SHIPPED | OUTPATIENT
Start: 2018-05-15 | End: 2018-09-23

## 2018-05-15 NOTE — PROGRESS NOTES
Chief Complaint   Patient presents with    Hypertension     follow up    Cholesterol Problem     follow up    Cough     x 1 week    Rash     under breast       Mammogram 9/7/2017    Colonoscopy 4/2/2015 by Dr. Delia Jimenez- repeat in 10 years

## 2018-05-15 NOTE — PATIENT INSTRUCTIONS
We will begin topamax to help with weight loss tends to work on the brain to make you less hungry and sometimes can change the taste of foods.     You will slowly increase your dose as follows:  - begin 1/2 of a 50 mg tablet at bedtime x 1 week, if tolerating without excessive daytime sleepiness or dizziness or numbness/tingling in your hands or feet, increase to  - 1/2 of a 50 mg tablet at breakfast and bedtime x 1 week, then  - 1/2 tablet at breakfast and a whole tablet at bedtime x 1 week, then  - a whole 50 tablet at breakfast and bedtime

## 2018-05-15 NOTE — PROGRESS NOTES
HISTORY OF PRESENT ILLNESS  Dawson Dobson is a 58 y.o. female. HPI   Follow up on chronic medical problems. Wants to get off of the [employer] d/t safety concerns. Discussed other options. Cardiovascular Review:  The patient has hypertension and hyperlipidemia. Diet and Lifestyle: generally follows a low fat low cholesterol diet, generally follows a low sodium diet, follows a diabetic diet regularly, exercises sporadically  Home BP Monitoring: is not measured at home. Pertinent ROS: taking medications as instructed, no medication side effects noted, no TIA's, no chest pain on exertion, no dyspnea on exertion, no swelling of ankles. DM type II follow up:  Diet controlled. Compliant w/ diabetic diet, and exercise. Has not been cheking BS recently. Denies any tingling sensation, polyuria and polydipsia. No blurred vision. No significant weight changes. Feeling well since last OV. Depression Review:  Patient is seen for followup of depression. Treatment includes Celexa. Ongoing symptoms include fatigue. She denies depressed mood, insomnia and hopelessness. She experiences the following side effects from the treatment: none.     Patient Active Problem List   Diagnosis Code    Environmental allergies Z91.09    Depression F32.9    C section     H/O: hysterectomy Z90.710    Hypovitaminosis D E55.9    Mixed hyperlipidemia E78.2    Benign essential hematuria R31.9    Diet-controlled diabetes mellitus ([address] E11.9    Hypertensive retinopathy H35.039    Encounter for medication monitoring Z51.81    At high risk for breast cancer Z91.89    Obesity, morbid (HCC) E66.01       Current Outpatient Prescriptions   Medication Sig Dispense Refill    citalopram (CELEXA) 20 mg tablet TAKE ONE TABLET BY MOUTH DAILY 90 Tab 2    ALPRAZolam (XANAX) 0.5 mg tablet TAKE ONE TABLET BY MOUTH AT NIGHTLY AS NEEDED 90 Tab 0    pravastatin (PRAVACHOL) 20 mg tablet TAKE ONE TABLET BY MOUTH EVERY NIGHT AT BEDTIME 90 Tab 2    promethazine-codeine (PHENERGAN WITH CODEINE) 6.25-10 mg/5 mL syrup Take 5 mL by mouth every six (6) hours as needed for Cough. Max Daily Amount: 20 mL. 240 mL 0    albuterol (PROAIR HFA) 90 mcg/actuation inhaler Take 2 Puffs by inhalation every four (4) hours as needed for Wheezing or Shortness of Breath. Please instruct Pt on how to use an inhaler. 1 Inhaler 3    albuterol (PROVENTIL HFA, VENTOLIN HFA, PROAIR HFA) 90 mcg/actuation inhaler Take 2 Puffs by inhalation every four (4) hours as needed for Wheezing. 1 Inhaler 0    furosemide (LASIX) 20 mg tablet TAKE ONE TABLET BY MOUTH DAILY 90 Tab 1    melatonin 3 mg tablet Take  by mouth.  levocetirizine (XYZAL) 5 mg tablet TAKE ONE TABLET BY MOUTH DAILY 30 Tab 6    omeprazole (PRILOSEC) 40 mg capsule TAKE ONE CAPSULE BY MOUTH DAILY 90 Cap 3    losartan-hydroCHLOROthiazide (HYZAAR) 50-12.5 mg per tablet Take 1 Tab by mouth daily. 90 Tab 3    acetaminophen (TYLENOL ARTHRITIS PAIN) 650 mg CR tablet Take 1,300 mg by mouth every six (6) hours as needed for Pain.  fluticasone (FLONASE) 50 mcg/actuation nasal spray Place 2 sprays in each nostril once daily. 1 Bottle 11    MULTIVITAMIN PO Take 1 tablet by mouth daily.  cholecalciferol, VITAMIN D3, (VITAMIN D3) 5,000 unit tab tablet Take 5,000 Units by mouth daily.  aspirin 81 mg chewable tablet Take 81 mg by mouth daily.  CALCIUM CARB/VIT D3/MINERALS (CALTRATE PLUS PO) Take 1 Tab by mouth daily. Allergies   Allergen Reactions    Ciprofloxacin Other (comments)     Insomnia and hot flashes    Demerol [Meperidine] Other (comments)     hallucinations    Doxycycline Unknown (comments)       \"Feeling flushed\"    Floxin [Ofloxacin] Other (comments)     insomnia    Lisinopril Cough    Phenergan-Codeine [Promethazine-Codeine] Itching     Patient stated she started itching when she was tapering off of medication.     Prednisone Other (comments)     Has feelings of depression, \"weird thoughts\"   ANS  Past Medical History:   Diagnosis Date    C section 3/12/2010    Depression 3/12/2010    Diabetes ([address]     Environmental allergies 3/12/2010    H/O: hysterectomy 3/12/2010    High cholesterol 3/12/2010    HTN (hypertension) 3/12/2010    Pneumonia 04/2017   PLANT  11/30/2011    left eye-     HX CYSTOSTOMY  3/2015    HX HYSTERECTOMY  01/01/1998                                                    Cancer Father      lung    Breast Cancer Sister     MS Sister     High Cholesterol Sister     Hypertension Sister     Hypertension Brother     Liver Disease Brother      Hep C    High Cholesterol Brother     Cancer Brother      colon    Hypertension Sister     Diabetes Sister     High Cholesterol Sister     Hypertension Sister     Other Sister      obese    High Cholesterol Sister        Social History   Substance Use Topics    Smoking status: Never Smoker    Smokeless tobacco: Never Used    Alcohol use 0.0 oz/week     0 Standard drinks or equivalent per week      Comment: occasional        Lab Results   Component Value Date/Time    WBC 12.7 (H) 01/15/2018 10:45 AM    Hemoglobin (POC) 15.0 09/29/2016 05:26 PM    HGB 14.2 01/15/2018 10:45 AM    Hematocrit (POC) 44 09/29/2016 05:26 PM    HCT 42.9 01/15/2018 10:45 AM    PLATELET 867 34/80/6746 10:45 AM    MCV 90 01/15/2018 10:45 AM       Lab Results   Component Value Date/Time    Cholesterol, total 175 01/15/2018 10:45 AM    HDL Cholesterol 41 01/15/2018 10:45 AM    LDL, calculated 104 (H) 01/15/2018 10:45 AM    Triglyceride 151 (H) 01/15/2018 10:45 AM    CHOL/HDL Ratio 4.9 09/13/2010 11:06 AM       Lab Results   Component Value Date/Time    TSH 1.430 03/22/2011 11:04 AM      Lab Results   Component Value Date/Time    Sodium 141 01/15/2018 10:45 AM    Potassium 4.2 01/15/2018 10:45 AM    Chloride 99 01/15/2018 10:45 AM    CO2 29 01/15/2018 10:45 AM    Anion gap 5 03/11/2015 01:35 PM    Glucose 102 (H) 01/15/2018 10:45 AM    BUN 13 01/15/2018 10:45 AM    Creatinine 0.79 01/15/2018 10:45 AM    BUN/Creatinine ratio 16 01/15/2018 10:45 AM    GFR est AA 92 01/15/2018 10:45 AM    GFR est non-AA 80 01/15/2018 10:45 AM    Calcium 9.6 01/15/2018 10:45 AM    Bilirubin, total 0.4 01/15/2018 10:45 AM    ALT (SGPT) 11 01/15/2018 10:45 AM    AST (SGOT) 14 01/15/2018 10:45 AM    Alk. phosphatase 73 01/15/2018 10:45 AM    Protein, total 6.5 01/15/2018 10:45 AM    Albumin 4.3 01/15/2018 10:45 AM    Globulin 3.5 03/11/2015 01:35 PM    A-G Ratio 2.0 01/15/2018 10:45 AM      Lab Results   Component Value Date/Time    Hemoglobin A1c 6.7 (H) 10/15/2013 12:55 PM    Hemoglobin A1c (POC) 6.2 01/15/2018 10:35 AM         Review of Systems   Constitutional: Negative for malaise/fatigue. HENT: Negative for congestion. Eyes: Negative for blurred vision. Respiratory: Negative for shortness of breath. Has had a cough over the past few weeks thinks d/t allergies. Cardiovascular: Negative for chest pain, palpitations and leg swelling. Gastrointestinal: Negative for abdominal pain, constipation and heartburn. Genitourinary: Negative for dysuria, frequency and urgency. Musculoskeletal: Negative for back pain and joint pain. Neurological: Negative for dizziness, tingling and headaches. Psychiatric/Behavioral: Negative for depression. The patient does not have insomnia. Physical Exam   Constitutional: She appears well-developed and well-nourished. /57 (BP 1 Location: Left arm, BP Patient Position: Sitting)  Pulse 71  Temp 97.6 °F (36.4 °C) (Oral)   Resp 16  Ht 5' 6\" (1.676 m)  Wt 256 lb 3.2 oz (116.2 kg)  LMP 11/19/1998  SpO2 96%  BMI 41.35 kg/m2  HENT:   Right Ear: Tympanic membrane and ear canal normal.   Left Ear: Tympanic membrane and ear canal normal.   Nose: No mucosal edema or rhinorrhea. Mouth/Throat: Oropharynx is clear and moist and mucous membranes are normal.   Neck: Normal range of motion. Neck supple. No thyromegaly present. Cardiovascular: Normal rate and regular rhythm. Pulmonary/Chest: Effort normal and breath sounds normal.   Abdominal: Soft. Bowel sounds are normal. There is no tenderness. Musculoskeletal: Normal range of motion. She exhibits no edema. Lymphadenopathy:     She has no cervical adenopathy. Skin: Skin is warm and dry. Rash under the breast is cleared. Psychiatric: She has a normal mood and affect. Nursing note and vitals reviewed. ASSESSMENT and PLAN  Diagnoses and all orders for this visit:    1. Benign essential HTN  Stable at goal.      2. Diet-controlled diabetes mellitus (HCC)  a1c level is 6.1%  -     AMB POC HEMOGLOBIN A1C  -     AMB POC GLUCOSE, QUANTITATIVE, BLOOD    3. Mixed hyperlipidemia  -     LIPID PANEL  -     SPECIMEN HANDLING,DR OFF->LAB    4. Allergic cough//  5. Environmental allergies  -     loratadine (CLARITIN) 10 mg tablet; Take 1 Tab by mouth daily as needed for Allergies. 6. Encounter for medication monitoring  -     METABOLIC PANEL, COMPREHENSIVE  -     SPECIMEN HANDLING,DR OFF->LAB    7. Obesity, morbid ([address]  Wants to get something to help controll appetite to help with weight loss. -     topiramate (TOPAMAX) 50 mg tablet; Take 1 Tab by mouth two (2) times a day. Discussed weight loss options, diet and exercise. Also reviewed health issues related to obesity. Initial goal of weight loss 10% of current weight. Counseled on goal for exercise of eventual goal of 30-60 minutes 5-7 times a week as per AHA guidelines. Decrease carbohydrates (white foods, sweet foods, sweet drinks and alcohol), increase green leafy vegetables and protein (lean meats and beans). Avoid fried foods. Increase water intake. Eat 3-5 small meals daily. Increase physical activity. 8. Gastroesophageal reflux disease without esophagitis  -     Change to raNITIdine (ZANTAC) 300 mg tablet; Take 1 Tab by mouth daily.       Follow-up Disposition:  Return in about 3 months (around 8/15/2018). reviewed diet, exercise and weight control  cardiovascular risk and specific lipid/LDL goals reviewed  reviewed medications and side effects in detail  specific diabetic recommendations: low cholesterol diet, weight control and daily exercise discussed, home glucose monitoring emphasized, foot care discussed and Podiatry visits discussed, annual eye examinations at Ophthalmology discussed, glycohemoglobin and other lab monitoring discussed and long term diabetic complications discussed     I have discussed diagnosis listed in this note with pt and/or family. I have discussed treatment plans and options and the risk/benefit analysis of those options, including safe use of medications and possible medication side effects. Through the use of shared decision making we have agreed to the above plan. The patient has received an after-visit summary and questions were answered concerning future plans and follow up. Advise pt of any urgent changes then to proceed to the ER.

## 2018-05-15 NOTE — MR AVS SNAPSHOT
[facility] 
 
 
 [address] [facility] [place] [address] [id number] 
[phone] Patient: Lam Morales MRN: [id number] RXO:7/22/1861 Visit Information Date & Time Provider Department Dept. Phone Encounter #  
 5/15/2018  7:45 AM Thao Montoya MD [facility] [phone] [id number] Follow-up Instructions Return in about 3 months (around 8/15/2018). Upcoming Health Maintenance Date Due HEMOGLOBIN A1C Q6M 7/15/2018 Influenza Age 5 to Adult 8/1/2018 LIPID PANEL Q1 1/15/2019 PAP AKA CERVICAL CYTOLOGY 2/5/2019 BREAST CANCER SCRN MAMMOGRAM 9/7/2019 COLONOSCOPY 4/2/2020 DTaP/Tdap/Td series (2 - Td) 6/19/2027 Allergies as of 5/15/2018  Review Complete On: 5/15/2018 By: Thao Montoya MD  
  
 Severity Noted Reaction Type Reactions Ciprofloxacin  08/30/2016    Other (comments) Insomnia and hot flashes Demerol [Meperidine]  06/14/2010    Other (comments)  
 hallucinations Doxycycline  06/14/2010    Unknown (comments) \"Feeling flushed\" Floxin [Ofloxacin]  06/14/2010    Other (comments)  
 insomnia Lisinopril  08/11/2017    Cough Phenergan-codeine [Promethazine-codeine]  01/15/2018    Itching Patient stated she started itching when she was tapering off of medication. Prednisone  10/25/2017    Other (comments) Has feelings of depression, \"weird thoughts\" Current Immunizations  Reviewed on 5/15/2018 Name Date Influenza Vaccine 11/19/2014, 10/15/2013 Influenza Vaccine (Quad) PF 1/15/2018, 12/5/2016 Pneumococcal Polysaccharide (PPSV-23) 12/5/2016 Tdap 6/19/2017 Reviewed by Thao Montoya MD on 5/15/2018 at  8:18 AM  
You Were Diagnosed With   
  
 Codes Comments Benign essential hematuria    -  Primary ICD-10-CM: R31.9 ICD-9-CM: 599.70 Diet-controlled diabetes mellitus ([facility] [address]     ICD-10-CM: E11.9 ICD-9-CM: 250.00 Mixed hyperlipidemia     ICD-10-CM: E78.2 ICD-9-CM: 272.2 Encounter for medication monitoring     ICD-10-CM: Z51.81 
ICD-9-CM: V58.83 Obesity, morbid ([facility] [address]     ICD-10-CM: E66.01 
ICD-9-CM: 278.01 Vitals BP Pulse Temp Resp Height(growth percentile) Weight(growth percentile) 121/57 (BP 1 Location: Left arm, BP Patient Position: Sitting) 71 97.6 °F (36.4 °C) (Oral) 16 5' 6\" (1.676 m) 256 lb 3.2 oz (116.2 kg) LMP SpO2 BMI OB Status Smoking Status 11/19/1998 96% 41.35 kg/m2 Hysterectomy Never Smoker BMI and BSA Data Body Mass Index Body Surface Area  
 41.35 kg/m 2 2.33 m 2 Preferred Pharmacy Pharmacy Name Phone Dania Delgado [address] [facility], [address] [place] [address] [phone] Your Updated Medication List  
  
   
This list is accurate as of 5/15/18  9:02 AM.  Always use your most recent med list.  
  
  
  
  
 albuterol 90 mcg/actuation inhaler Commonly known as:  PROAIR HFA Take 2 Puffs by inhalation every four (4) hours as needed for Wheezing or Shortness of Breath. Please instruct Pt on how to use an inhaler. ALPRAZolam 0.5 mg tablet Commonly known as:  XANAX  
TAKE ONE TABLET BY MOUTH AT NIGHTLY AS NEEDED  
  
 aspirin 81 mg chewable tablet Take 81 mg by mouth daily. CALTRATE PLUS PO Take 1 Tab by mouth two (2) times a week. cholecalciferol (VITAMIN D3) 5,000 unit Tab tablet Commonly known as:  VITAMIN D3 Take 5,000 Units by mouth daily. citalopram 20 mg tablet Commonly known as:  CELEXA  
TAKE ONE TABLET BY MOUTH DAILY  
  
 fluticasone 50 mcg/actuation nasal spray Commonly known as:  Eleazar Rios Place 2 sprays in each nostril once daily. furosemide 20 mg tablet Commonly known as:  LASIX TAKE ONE TABLET BY MOUTH DAILY  
  
 levocetirizine 5 mg tablet Commonly known as:  Dewanda Gather TAKE ONE TABLET BY MOUTH DAILY losartan-hydroCHLOROthiazide 50-12.5 mg per tablet Commonly known as:  HYZAAR Take 1 Tab by mouth daily. melatonin 3 mg tablet Take 3 mg by mouth nightly as needed. MULTIVITAMIN PO Take 1 Tab by mouth two (2) times a week. pravastatin 20 mg tablet Commonly known as:  PRAVACHOL  
TAKE ONE TABLET BY MOUTH EVERY NIGHT AT BEDTIME  
  
 promethazine-codeine 6.25-10 mg/5 mL syrup Commonly known as:  PHENERGAN with CODEINE Take 5 mL by mouth every six (6) hours as needed for Cough. Max Daily Amount: 20 mL. raNITIdine 300 mg tablet Commonly known as:  ZANTAC Take 1 Tab by mouth daily. topiramate 50 mg tablet Commonly known as:  TOPAMAX Take 1 Tab by mouth two (2) times a day. TYLENOL ARTHRITIS PAIN 650 mg Gayleen Rising Generic drug:  acetaminophen Take 1,300 mg by mouth every six (6) hours as needed for Pain. VITAMIN B-12 1,000 mcg tablet Generic drug:  cyanocobalamin Take 1,000 mcg by mouth daily. Prescriptions Sent to Pharmacy Refills  
 raNITIdine (ZANTAC) 300 mg tablet 3 Sig: Take 1 Tab by mouth daily. Class: Normal  
 Pharmacy: [address] Ph #: [phone] Route: Oral  
 topiramate (TOPAMAX) 50 mg tablet 3 Sig: Take 1 Tab by mouth two (2) times a day. Class: Normal  
 Pharmacy: [address] Ph #: [phone] Route: Oral  
  
We Performed the Following AMB POC GLUCOSE, QUANTITATIVE, BLOOD [20250 CPT(R)] AMB POC HEMOGLOBIN A1C [63289 CPT(R)] LIPID PANEL [76265 CPT(R)] METABOLIC PANEL, COMPREHENSIVE [29884 CPT(R)] SPECIMEN HANDLING,DR OFF->LAB [id number] CPT(R)] Follow-up Instructions Return in about 3 months (around 8/15/2018). Patient Instructions We will begin topamax to help with weight loss tends to work on the brain to make you less hungry and sometimes can change the taste of foods. You will slowly increase your dose as follows: - begin 1/2 of a 50 mg tablet at bedtime x 1 week, if tolerating without excessive daytime sleepiness or dizziness or numbness/tingling in your hands or feet, increase to 
- 1/2 of a 50 mg tablet at breakfast and bedtime x 1 week, then 
- 1/2 tablet at breakfast and a whole tablet at bedtime x 1 week, then - a whole 50 tablet at breakfast and bedtime Introducing [facility] HE[facility]! Dear Mary Collins: 
Thank you for requesting a Messagemind account. Our records indicate that you already have an active Messagemind account. You can access your account anytime at [email or web address] Did you know that you can access your hospital and ER discharge instructions at any time in Messagemind? You can also review all of your test results from your hospital stay or ER visit. Additional Information If you have questions, please visit the Frequently Asked Questions section of the Messagemind website at [email or web address] Remember, Messagemind is NOT to be used for urgent needs. For medical emergencies, dial 911. Now available from your iPhone and Android! Please provide this summary of care documentation to your next provider. Your primary care clinician is listed as Samantha Slade. If you have any questions after today's visit, please call [phone].

## 2018-05-16 LAB
ALBUMIN SERPL-MCNC: 4 G/DL (ref 3.6–4.8)
ALBUMIN/GLOB SERPL: 2.1 {RATIO} (ref 1.2–2.2)
ALP SERPL-CCNC: 55 IU/L (ref 39–117)
ALT SERPL-CCNC: 13 IU/L (ref 0–32)
AST SERPL-CCNC: 11 IU/L (ref 0–40)
BILIRUB SERPL-MCNC: 0.4 MG/DL (ref 0–1.2)
BUN SERPL-MCNC: 10 MG/DL (ref 8–27)
BUN/CREAT SERPL: 14 (ref 12–28)
CALCIUM SERPL-MCNC: 9.4 MG/DL (ref 8.7–10.3)
CHLORIDE SERPL-SCNC: 104 MMOL/L (ref 96–106)
CHOLEST SERPL-MCNC: 162 MG/DL (ref 100–199)
CO2 SERPL-SCNC: 24 MMOL/L (ref 18–29)
CREAT SERPL-MCNC: 0.71 MG/DL (ref 0.57–1)
GFR SERPLBLD CREATININE-BSD FMLA CKD-EPI: 104 ML/MIN/1.73
GFR SERPLBLD CREATININE-BSD FMLA CKD-EPI: 90 ML/MIN/1.73
GLOBULIN SER CALC-MCNC: 1.9 G/DL (ref 1.5–4.5)
GLUCOSE SERPL-MCNC: 108 MG/DL (ref 65–99)
HDLC SERPL-MCNC: 38 MG/DL
INTERPRETATION, 910389: NORMAL
LDLC SERPL CALC-MCNC: 98 MG/DL (ref 0–99)
POTASSIUM SERPL-SCNC: 3.8 MMOL/L (ref 3.5–5.2)
PROT SERPL-MCNC: 5.9 G/DL (ref 6–8.5)
SODIUM SERPL-SCNC: 143 MMOL/L (ref 134–144)
TRIGL SERPL-MCNC: 131 MG/DL (ref 0–149)
VLDLC SERPL CALC-MCNC: 26 MG/DL (ref 5–40)

## 2018-06-22 RX ORDER — OMEPRAZOLE 40 MG/1
40 CAPSULE, DELAYED RELEASE ORAL DAILY
Qty: 90 CAP | Refills: 3 | Status: SHIPPED | OUTPATIENT
Start: 2018-06-22 | End: 2019-07-27 | Stop reason: SDUPTHER

## 2018-06-22 NOTE — TELEPHONE ENCOUNTER
Patient returning call and states her GERD medication was changed to Zantac 300mg daily but not helping. Patient reports she belching a lot at night and would like to go back to Omeprazole since she did not have a problem with that medication and it helped. Informed patient will check with Dr. Yovanny Shields and call back. Patient verbalized understanding. Called patient and informed dr. Yovanny Shields will be sending a RX for Omeprazole to the pharm. Patient verbalized understanding.

## 2018-06-22 NOTE — TELEPHONE ENCOUNTER
Patient called stating that she had a change in her acid reflux medication and now she is having problems with chest tightness and pain only at night. Patient would like to know what can be done to help with this problem. Patient stated she is also dealing with vertigo when she goes to get out of bed. Patient can be reached at [phone].

## 2018-07-13 DIAGNOSIS — Z91.09 ENVIRONMENTAL ALLERGIES: ICD-10-CM

## 2018-07-13 RX ORDER — FLUTICASONE PROPIONATE 50 MCG
SPRAY, SUSPENSION (ML) NASAL
Qty: 1 BOTTLE | Refills: 0 | Status: SHIPPED | OUTPATIENT
Start: 2018-07-13 | End: 2018-10-15 | Stop reason: SDUPTHER

## 2018-08-17 RX ORDER — LOSARTAN POTASSIUM AND HYDROCHLOROTHIAZIDE 12.5; 5 MG/1; MG/1
TABLET ORAL
Qty: 90 TAB | Refills: 2 | Status: SHIPPED | OUTPATIENT
Start: 2018-08-17 | End: 2019-05-24 | Stop reason: SDUPTHER

## 2018-08-28 RX ORDER — LEVOCETIRIZINE DIHYDROCHLORIDE 5 MG/1
TABLET, FILM COATED ORAL
Qty: 30 TAB | Refills: 5 | Status: SHIPPED | OUTPATIENT
Start: 2018-08-28 | End: 2018-11-13 | Stop reason: SDUPTHER

## 2018-09-23 ENCOUNTER — OFFICE VISIT (OUTPATIENT)
Dept: URGENT CARE | Age: 63
End: 2018-09-23

## 2018-09-23 VITALS
WEIGHT: 254 LBS | TEMPERATURE: 97.6 F | SYSTOLIC BLOOD PRESSURE: 143 MMHG | RESPIRATION RATE: 16 BRPM | OXYGEN SATURATION: 99 % | BODY MASS INDEX: 40.82 KG/M2 | HEART RATE: 77 BPM | HEIGHT: 66 IN | DIASTOLIC BLOOD PRESSURE: 82 MMHG

## 2018-09-23 DIAGNOSIS — J01.00 ACUTE NON-RECURRENT MAXILLARY SINUSITIS: Primary | ICD-10-CM

## 2018-09-23 RX ORDER — AMOXICILLIN AND CLAVULANATE POTASSIUM 875; 125 MG/1; MG/1
1 TABLET, FILM COATED ORAL 2 TIMES DAILY
Qty: 20 TAB | Refills: 0 | Status: SHIPPED | OUTPATIENT
Start: 2018-09-23 | End: 2018-10-03

## 2018-09-23 RX ORDER — BENZONATATE 100 MG/1
100 CAPSULE ORAL
Qty: 30 CAP | Refills: 0 | Status: SHIPPED | OUTPATIENT
Start: 2018-09-23 | End: 2018-09-30

## 2018-09-23 NOTE — PATIENT INSTRUCTIONS
Rest and seek medical care for increased problems, any questions or concern including but not limited to the ones discussed with you here today. Drink plenty of fluids use saline nasal spray, muccinex as directed and a humidifier may help. Sinusitis: Care Instructions  Your Care Instructions    Sinusitis is an infection of the lining of the sinus cavities in your head. Sinusitis often follows a cold. It causes pain and pressure in your head and face. In most cases, sinusitis gets better on its own in 1 to 2 weeks. But some mild symptoms may last for several weeks. Sometimes antibiotics are needed. Follow-up care is a key part of your treatment and safety. Be sure to make and go to all appointments, and call your doctor if you are having problems. It's also a good idea to know your test results and keep a list of the medicines you take. How can you care for yourself at home? · Take an over-the-counter pain medicine, such as acetaminophen (Tylenol), ibuprofen (Advil, Motrin), or naproxen (Aleve). Read and follow all instructions on the label. · If the doctor prescribed antibiotics, take them as directed. Do not stop taking them just because you feel better. You need to take the full course of antibiotics. · Be careful when taking over-the-counter cold or flu medicines and Tylenol at the same time. Many of these medicines have acetaminophen, which is Tylenol. Read the labels to make sure that you are not taking more than the recommended dose. Too much acetaminophen (Tylenol) can be harmful. · Breathe warm, moist air from a steamy shower, a hot bath, or a sink filled with hot water. Avoid cold, dry air. Using a humidifier in your home may help. Follow the directions for cleaning the machine. · Use saline (saltwater) nasal washes to help keep your nasal passages open and wash out mucus and bacteria. You can buy saline nose drops at a grocery store or drugstore.  Or you can make your own at home by adding 1 teaspoon of salt and 1 teaspoon of baking soda to 2 cups of distilled water. If you make your own, fill a bulb syringe with the solution, insert the tip into your nostril, and squeeze gently. Doris Brusly your nose. · Put a hot, wet towel or a warm gel pack on your face 3 or 4 times a day for 5 to 10 minutes each time. · Try a decongestant nasal spray like oxymetazoline (Afrin). Do not use it for more than 3 days in a row. Using it for more than 3 days can make your congestion worse. When should you call for help? Call your doctor now or seek immediate medical care if:    · You have new or worse swelling or redness in your face or around your eyes.     · You have a new or higher fever.    Watch closely for changes in your health, and be sure to contact your doctor if:    · You have new or worse facial pain.     · The mucus from your nose becomes thicker (like pus) or has new blood in it.     · You are not getting better as expected. Where can you learn more? Go to http://royal-maeve.info/. Enter V984 in the search box to learn more about \"Sinusitis: Care Instructions. \"  Current as of: May 12, 2017  Content Version: 11.7  © 6995-1745 Netscape. Care instructions adapted under license by [employer] (which disclaims liability or warranty for this information). If you have questions about a medical condition or this instruction, always ask your healthcare professional. [place] [address] any warranty or liability for your use of this information.

## 2018-09-23 NOTE — LETTER
NOTIFICATION RETURN TO WORK / SCHOOL 
 
9/23/2018 3:24 PM 
 
Ms. Anita Langford [address] [place] [id number] To Whom It May Concern: 
 
Anita Langford is currently under the care of [address] [place] [address]. She will return to work/school on: 9/26/18 If there are questions or concerns please have the patient contact our office. Sincerely, Victor Manuel Buckley

## 2018-09-23 NOTE — MR AVS SNAPSHOT
[address] [place] [address] 
[phone] Patient: Mckenna Granda MRN: [id number] SPF:[birth year] Visit Information Date & Time Provider Department Dept. Phone Encounter #  
 9/23/2018  2:45 PM [address] [phone] [id number] Your Appointments 10/30/2018 11:45 AM  
ROUTINE CARE with Shiv Flynn MD  
[facility]) Appt Note: routine follow up; r/s routine follow up 08.27.18 ALG  
 [address] [facility] [address] [id number]  
[phone] [address] [facility] [address] Upcoming Health Maintenance Date Due Influenza Age 5 to Adult 8/1/2018 HEMOGLOBIN A1C Q6M 11/15/2018 PAP AKA CERVICAL CYTOLOGY 2/5/2019 LIPID PANEL Q1 5/15/2019 BREAST CANCER SCRN MAMMOGRAM 9/7/2019 COLONOSCOPY 4/2/2020 DTaP/Tdap/Td series (2 - Td) 6/19/2027 Allergies as of 9/23/2018  Review Complete On: 9/23/2018 By: Mark Anthony Luna,  Severity Noted Reaction Type Reactions Ciprofloxacin  08/30/2016    Other (comments) Insomnia and hot flashes Demerol [Meperidine]  06/14/2010    Other (comments)  
 hallucinations Doxycycline  06/14/2010    Unknown (comments) \"Feeling flushed\" Floxin [Ofloxacin]  06/14/2010    Other (comments)  
 insomnia Lisinopril  08/11/2017    Cough Phenergan-codeine [Promethazine-codeine]  01/15/2018    Itching Patient stated she started itching when she was tapering off of medication. Prednisone  10/25/2017    Other (comments) Has feelings of depression, \"weird thoughts\" Current Immunizations  Reviewed on 5/15/2018 Name Date Influenza Vaccine 11/19/2014, 10/15/2013 Influenza Vaccine (Quad) PF 1/15/2018, 12/5/2016 Pneumococcal Polysaccharide (PPSV-23) 12/5/2016 Tdap 6/19/2017 Not reviewed this visit You Were Diagnosed With   
  
 Codes Comments Acute non-recurrent maxillary sinusitis    -  Primary ICD-10-CM: J01.00 ICD-9-CM: 461.0 Vitals BP Pulse Temp Resp Height(growth percentile) Weight(growth percentile) 143/82 77 97.6 °F (36.4 °C) 16 5' 6\" (1.676 m) 254 lb (115.2 kg) LMP SpO2 BMI OB Status Smoking Status 11/19/1998 99% 41 kg/m2 Hysterectomy Never Smoker BMI and BSA Data Body Mass Index Body Surface Area 41 kg/m 2 2.32 m 2 Preferred Pharmacy Pharmacy Name Phone Maged Martinez [address] , [address] [facility] [phone] Your Updated Medication List  
  
   
This list is accurate as of 9/23/18  3:25 PM.  Always use your most recent med list.  
  
  
  
  
 albuterol 90 mcg/actuation inhaler Commonly known as:  PROAIR HFA Take 2 Puffs by inhalation every four (4) hours as needed for Wheezing or Shortness of Breath. Please instruct Pt on how to use an inhaler. ALPRAZolam 0.5 mg tablet Commonly known as:  XANAX  
TAKE ONE TABLET BY MOUTH AT NIGHTLY AS NEEDED  
  
 amoxicillin-clavulanate 875-125 mg per tablet Commonly known as:  AUGMENTIN Take 1 Tab by mouth two (2) times a day for 10 days. aspirin 81 mg chewable tablet Take 81 mg by mouth daily. benzonatate 100 mg capsule Commonly known as:  TESSALON PERLES Take 1 Cap by mouth three (3) times daily as needed for Cough for up to 7 days. CALTRATE PLUS PO Take 1 Tab by mouth two (2) times a week. cholecalciferol (VITAMIN D3) 5,000 unit Tab tablet Commonly known as:  VITAMIN D3 Take 5,000 Units by mouth daily. citalopram 20 mg tablet Commonly known as:  CELEXA  
TAKE ONE TABLET BY MOUTH DAILY  
  
 fluticasone 50 mcg/actuation nasal spray Commonly known as:  Ifeoma Shames SPRAY TWO SPRAYS IN EACH NOSTRIL ONCE DAILY  
  
 furosemide 20 mg tablet Commonly known as:  LASIX TAKE ONE TABLET BY MOUTH DAILY  
  
 levocetirizine 5 mg tablet Commonly known as:  Alexandre Ansari TAKE ONE TABLET BY MOUTH DAILY  
  
 loratadine 10 mg tablet Commonly known as:  Silverio Itzel Take 1 Tab by mouth daily as needed for Allergies. losartan-hydroCHLOROthiazide 50-12.5 mg per tablet Commonly known as:  HYZAAR  
TAKE ONE TABLET BY MOUTH DAILY  
  
 melatonin 3 mg tablet Take 3 mg by mouth nightly as needed. MULTIVITAMIN PO Take 1 Tab by mouth two (2) times a week. omeprazole 40 mg capsule Commonly known as:  PRILOSEC Take 1 Cap by mouth daily. pravastatin 20 mg tablet Commonly known as:  PRAVACHOL  
TAKE ONE TABLET BY MOUTH EVERY NIGHT AT BEDTIME  
  
 topiramate 50 mg tablet Commonly known as:  TOPAMAX Take 1 Tab by mouth two (2) times a day. TYLENOL ARTHRITIS PAIN 650 mg Merril Cave Generic drug:  acetaminophen Take 1,300 mg by mouth every six (6) hours as needed for Pain. VITAMIN B-12 1,000 mcg tablet Generic drug:  cyanocobalamin Take 1,000 mcg by mouth daily. Prescriptions Sent to Pharmacy Refills  
 amoxicillin-clavulanate (AUGMENTIN) 875-125 mg per tablet 0 Sig: Take 1 Tab by mouth two (2) times a day for 10 days. Class: Normal  
 Pharmacy: [address] Ph #: [phone] Route: Oral  
 benzonatate (TESSALON PERLES) 100 mg capsule 0 Sig: Take 1 Cap by mouth three (3) times daily as needed for Cough for up to 7 days. Class: Normal  
 Pharmacy: [address] Ph #: [phone] Route: Oral  
  
Patient Instructions Rest and seek medical care for increased problems, any questions or concern including but not limited to the ones discussed with you here today. Drink plenty of fluids use saline nasal spray, muccinex as directed and a humidifier may help. Sinusitis: Care Instructions Your Care Instructions Sinusitis is an infection of the lining of the sinus cavities in your head. Sinusitis often follows a cold. It causes pain and pressure in your head and face. In most cases, sinusitis gets better on its own in 1 to 2 weeks. But some mild symptoms may last for several weeks. Sometimes antibiotics are needed. Follow-up care is a key part of your treatment and safety. Be sure to make and go to all appointments, and call your doctor if you are having problems. It's also a good idea to know your test results and keep a list of the medicines you take. How can you care for yourself at home? · Take an over-the-counter pain medicine, such as acetaminophen (Tylenol), ibuprofen (Advil, Motrin), or naproxen (Aleve). Read and follow all instructions on the label. · If the doctor prescribed antibiotics, take them as directed. Do not stop taking them just because you feel better. You need to take the full course of antibiotics. · Be careful when taking over-the-counter cold or flu medicines and Tylenol at the same time. Many of these medicines have acetaminophen, which is Tylenol. Read the labels to make sure that you are not taking more than the recommended dose. Too much acetaminophen (Tylenol) can be harmful. · Breathe warm, moist air from a steamy shower, a hot bath, or a sink filled with hot water. Avoid cold, dry air. Using a humidifier in your home may help. Follow the directions for cleaning the machine. · Use saline (saltwater) nasal washes to help keep your nasal passages open and wash out mucus and bacteria. You can buy saline nose drops at a grocery store or drugstore. Or you can make your own at home by adding 1 teaspoon of salt and 1 teaspoon of baking soda to 2 cups of distilled water. If you make your own, fill a bulb syringe with the solution, insert the tip into your nostril, and squeeze gently. Milford Cornea your nose. · Put a hot, wet towel or a warm gel pack on your face 3 or 4 times a day for 5 to 10 minutes each time. · Try a decongestant nasal spray like oxymetazoline (Afrin). Do not use it for more than 3 days in a row. Using it for more than 3 days can make your congestion worse. When should you call for help? Call your doctor now or seek immediate medical care if: 
  · You have new or worse swelling or redness in your face or around your eyes.  
  · You have a new or higher fever.  
 Watch closely for changes in your health, and be sure to contact your doctor if: 
  · You have new or worse facial pain.  
  · The mucus from your nose becomes thicker (like pus) or has new blood in it.  
  · You are not getting better as expected. Where can you learn more? Go to http://royal-maeve.info/. Enter J510 in the search box to learn more about \"Sinusitis: Care Instructions. \" Current as of: May 12, 2017 Content Version: 11.7 © 3690-1952 Classiq[employer]. Care instructions adapted under license by [employer] (which disclaims liability or warranty for this information). If you have questions about a medical condition or this instruction, always ask your healthcare professional. [place] [address] any warranty or liability for your use of this information. Introducing [facility]! Dear Chrissy Hernandez: 
Thank you for requesting a Mtime account. Our records indicate that you already have an active Mtime account. You can access your account anytime at [email or web address] Did you know that you can access your hospital and ER discharge instructions at any time in Mtime? You can also review all of your test results from your hospital stay or ER visit. Additional Information If you have questions, please visit the Frequently Asked Questions section of the Mtime website at [email or web address] Remember, Mtime is NOT to be used for urgent needs. For medical emergencies, dial 911. Now available from your iPhone and Android! Please provide this summary of care documentation to your next provider. Your primary care clinician is listed as Juan Jose Ramos. If you have any questions after today's visit, please call [phone].

## 2018-09-23 NOTE — PROGRESS NOTES
Patient is a 58 y.o. female presenting with sinus pain. The history is provided by the patient. Sinus Pain   This is a new problem. The current episode started more than 1 week ago. The problem occurs constantly. The problem has been gradually worsening. Associated symptoms include headaches (sinus HA). Pertinent negatives include no chest pain, no abdominal pain and no shortness of breath. Associated symptoms comments: Dry cough. Nothing aggravates the symptoms. Nothing relieves the symptoms. Treatments tried: cold medications and rest. The treatment provided mild relief.         Past Medical History:   Diagnosis Date    C section 3/12/2010    Depression 3/12/2010    Diabetes ([facility] [address]     Environmental allergies 3/12/2010    H/O: hysterectomy 3/12/2010    High cholesterol 3/12/2010    HTN (hypertension) 3/12/2010    Pneumonia 2017        Past Surgical History:   Procedure Laterality Date    ENDOSCOPY, COLON, DIAGNOSTIC      repeat 10 yrs (Dr. Ralf Felix)    HX BREAST BIOPSY  1980    both breast    HX BREAST BIOPSY  1986    left breast    HX  SECTION      x 3    HX CORNEAL TRANSPLANT  2011    left eye-     HX CYSTOSTOMY  3/2015    HX HYSTERECTOMY  1998         Family History   Problem Relation Age of Onset    Hypertension Mother     Other Mother      obesity, chf    Heart Disease Mother      CHF    Kidney Disease Mother     High Cholesterol Mother     Cancer Father      lung    Breast Cancer Sister     MS Sister     High Cholesterol Sister     Hypertension Sister     Hypertension Brother     Liver Disease Brother      Hep C    High Cholesterol Brother     Cancer Brother      colon    Hypertension Sister     Diabetes Sister     High Cholesterol Sister     Hypertension Sister     Other Sister      obese    High Cholesterol Sister         Social History     Social History    Marital status:      Spouse name: N/A    Number of children: N/A  Years of education: N/A     Occupational History    Not on file. Social History Main Topics    Smoking status: Never Smoker    Smokeless tobacco: Never Used    Alcohol use 0.0 oz/week     0 Standard drinks or equivalent per week      Comment: occasional    Drug use: No    Sexual activity: Yes     Partners: Male     Birth control/ protection: Surgical     Other Topics Concern    Not on file     Social History Narrative                ALLERGIES: Ciprofloxacin; Demerol [meperidine]; Doxycycline; Floxin [ofloxacin]; Lisinopril; Phenergan-codeine [promethazine-codeine]; and Prednisone    Review of Systems   Constitutional: Negative. HENT: Positive for congestion, postnasal drip, sinus pain and sinus pressure. Eyes: Negative. Respiratory: Positive for cough. Negative for shortness of breath. Cardiovascular: Negative. Negative for chest pain. Gastrointestinal: Negative for abdominal pain. Neurological: Positive for headaches (sinus HA). Vitals:    09/23/18 1507   BP: 143/82   Pulse: 77   Resp: 16   Temp: 97.6 °F (36.4 °C)   SpO2: 99%   Weight: 254 lb (115.2 kg)   Height: 5' 6\" (1.676 m)       Physical Exam   Constitutional: She is oriented to person, place, and time. She appears well-developed and well-nourished. HENT:   Head: Normocephalic. Mouth/Throat: No oropharyngeal exudate. Nasal congestion, maxillary sinus pain, PND, BL fluid behind the TM's without erythema     Eyes: Conjunctivae are normal.   Neck: Normal range of motion. Neck supple. No tracheal deviation present. No thyromegaly present. Cardiovascular: Normal rate, regular rhythm and normal heart sounds. No murmur heard. Pulmonary/Chest: Effort normal and breath sounds normal. No respiratory distress. She has no wheezes. She has no rales. Occasional dry cough   Musculoskeletal: Normal range of motion. She exhibits no edema or tenderness. Lymphadenopathy:     She has no cervical adenopathy.    Neurological: She is alert and oriented to person, place, and time. Skin: Skin is warm. No erythema. Psychiatric: She has a normal mood and affect. Her behavior is normal.   Nursing note and vitals reviewed. MDM     Risk of Significant Complications, Morbidity, and/or Mortality:   Presenting problems: Moderate  Management options: Moderate  Progress:   Patient progress:  Stable      Procedures                         ICD-10-CM ICD-9-CM    1. Acute non-recurrent maxillary sinusitis J01.00 461.0      Medications Ordered Today   Medications    amoxicillin-clavulanate (AUGMENTIN) 875-125 mg per tablet     Sig: Take 1 Tab by mouth two (2) times a day for 10 days. Dispense:  20 Tab     Refill:  0    benzonatate (TESSALON PERLES) 100 mg capsule     Sig: Take 1 Cap by mouth three (3) times daily as needed for Cough for up to 7 days. Dispense:  30 Cap     Refill:  0     The patients condition was discussed with the patient and they understand. The patient is to follow up with primary care doctor ,If signs and symptoms become worse the pt is to go to the ER. The patient is to take medications as prescribed.

## 2018-10-15 DIAGNOSIS — Z91.09 ENVIRONMENTAL ALLERGIES: ICD-10-CM

## 2018-10-15 RX ORDER — FLUTICASONE PROPIONATE 50 MCG
SPRAY, SUSPENSION (ML) NASAL
Qty: 1 BOTTLE | Refills: 0 | Status: SHIPPED | OUTPATIENT
Start: 2018-10-15 | End: 2018-11-13 | Stop reason: SDUPTHER

## 2018-11-05 DIAGNOSIS — E66.01 OBESITY, MORBID (HCC): ICD-10-CM

## 2018-11-05 RX ORDER — TOPIRAMATE 50 MG/1
TABLET, FILM COATED ORAL
Qty: 60 TAB | Refills: 2 | Status: SHIPPED | OUTPATIENT
Start: 2018-11-05 | End: 2018-11-13 | Stop reason: SDUPTHER

## 2018-11-13 ENCOUNTER — OFFICE VISIT (OUTPATIENT)
Dept: FAMILY MEDICINE CLINIC | Age: 63
End: 2018-11-13

## 2018-11-13 VITALS
DIASTOLIC BLOOD PRESSURE: 60 MMHG | HEART RATE: 76 BPM | HEIGHT: 66 IN | RESPIRATION RATE: 18 BRPM | OXYGEN SATURATION: 96 % | SYSTOLIC BLOOD PRESSURE: 122 MMHG | BODY MASS INDEX: 40.08 KG/M2 | WEIGHT: 249.4 LBS | TEMPERATURE: 97.9 F

## 2018-11-13 DIAGNOSIS — Z23 ENCOUNTER FOR IMMUNIZATION: ICD-10-CM

## 2018-11-13 DIAGNOSIS — F32.A DEPRESSION, UNSPECIFIED DEPRESSION TYPE: ICD-10-CM

## 2018-11-13 DIAGNOSIS — Z23 NEED FOR SHINGLES VACCINE: ICD-10-CM

## 2018-11-13 DIAGNOSIS — I10 ESSENTIAL HYPERTENSION: Primary | ICD-10-CM

## 2018-11-13 DIAGNOSIS — E78.2 MIXED HYPERLIPIDEMIA: ICD-10-CM

## 2018-11-13 DIAGNOSIS — Z91.09 ENVIRONMENTAL ALLERGIES: ICD-10-CM

## 2018-11-13 DIAGNOSIS — E66.01 OBESITY, MORBID (HCC): ICD-10-CM

## 2018-11-13 DIAGNOSIS — Z12.31 ENCOUNTER FOR SCREENING MAMMOGRAM FOR BREAST CANCER: ICD-10-CM

## 2018-11-13 DIAGNOSIS — Z51.81 ENCOUNTER FOR MEDICATION MONITORING: ICD-10-CM

## 2018-11-13 DIAGNOSIS — E11.9 DIET-CONTROLLED DIABETES MELLITUS (HCC): ICD-10-CM

## 2018-11-13 DIAGNOSIS — Z91.89 AT HIGH RISK FOR BREAST CANCER: ICD-10-CM

## 2018-11-13 DIAGNOSIS — Z13.820 SCREENING FOR OSTEOPOROSIS: ICD-10-CM

## 2018-11-13 LAB
GLUCOSE POC: 106 MG/DL
HBA1C MFR BLD HPLC: 6 %

## 2018-11-13 RX ORDER — LANOLIN ALCOHOL/MO/W.PET/CERES
1000 CREAM (GRAM) TOPICAL DAILY
COMMUNITY
End: 2022-02-15 | Stop reason: ALTCHOICE

## 2018-11-13 RX ORDER — MELATONIN
1000 DAILY
COMMUNITY
End: 2022-02-24

## 2018-11-13 RX ORDER — BENZONATATE 200 MG/1
CAPSULE ORAL
COMMUNITY
End: 2018-11-13 | Stop reason: ALTCHOICE

## 2018-11-13 RX ORDER — FLUTICASONE PROPIONATE 50 MCG
SPRAY, SUSPENSION (ML) NASAL
Qty: 1 BOTTLE | Refills: 11 | Status: SHIPPED | OUTPATIENT
Start: 2018-11-13 | End: 2019-12-15 | Stop reason: SDUPTHER

## 2018-11-13 RX ORDER — DOXYCYCLINE 100 MG/1
CAPSULE ORAL
COMMUNITY
End: 2018-11-13 | Stop reason: ALTCHOICE

## 2018-11-13 RX ORDER — LORATADINE 10 MG/1
10 TABLET ORAL
COMMUNITY
End: 2020-05-29 | Stop reason: SDUPTHER

## 2018-11-13 RX ORDER — TOPIRAMATE 50 MG/1
TABLET, FILM COATED ORAL
Qty: 60 TAB | Refills: 11 | Status: SHIPPED | OUTPATIENT
Start: 2018-11-13 | End: 2019-11-15 | Stop reason: SDUPTHER

## 2018-11-13 RX ORDER — LEVOCETIRIZINE DIHYDROCHLORIDE 5 MG/1
TABLET, FILM COATED ORAL
Qty: 30 TAB | Refills: 11 | Status: SHIPPED | OUTPATIENT
Start: 2018-11-13 | End: 2019-12-13 | Stop reason: SDUPTHER

## 2018-11-13 RX ORDER — FLUTICASONE PROPIONATE 220 UG/1
AEROSOL, METERED RESPIRATORY (INHALATION)
COMMUNITY
End: 2018-11-13 | Stop reason: ALTCHOICE

## 2018-11-13 NOTE — LETTER
11/15/2018 1:11 PM 
 
Ms. Sloan Valdez [address] [place] [id number] Dear Sloan Valdez: 
 
Please find your most recent results below. Resulted Orders AMB POC GLUCOSE, QUANTITATIVE, BLOOD Result Value Ref Range Glucose  mg/dL Narrative Reference Range  WB Glucose   mg/dl [address] METABOLIC PANEL, BASIC Result Value Ref Range Glucose 97 65 - 99 mg/dL BUN 16 8 - 27 mg/dL Creatinine 0.96 0.57 - 1.00 mg/dL GFR est non-AA 63 >59 mL/min/1.73 GFR est AA 72 >59 mL/min/1.73  
 BUN/Creatinine ratio 17 12 - 28 Sodium 145 (H) 134 - 144 mmol/L Potassium 3.8 3.5 - 5.2 mmol/L Chloride 106 96 - 106 mmol/L  
 CO2 25 20 - 29 mmol/L Calcium 9.5 8.7 - 10.3 mg/dL Narrative Performed at:  [address]  [id number] : Lucas Coreas MD, Phone:  [phone] AMB POC HEMOGLOBIN A1C Result Value Ref Range Hemoglobin A1c (POC) 6.0 % Narrative Hemoglobin A1c Increased risk for diabetes: 5.7-6.4% Diabetes >6.4% Glycemic control for diabetics: <7.0% [address] LIPID PANEL Result Value Ref Range Cholesterol, total 168 100 - 199 mg/dL Triglyceride 150 (H) 0 - 149 mg/dL HDL Cholesterol 38 (L) >39 mg/dL VLDL, calculated 30 5 - 40 mg/dL LDL, calculated 100 (H) 0 - 99 mg/dL Narrative Performed at:  [address]  [id number] : Lucas Coreas MD, Phone:  [phone] RECOMMENDATIONS: 
None. Keep up the good work! Your A1C level (test for diabetes) is still slightly high. We will continue to monitor this test for now. Please  watch your diet closely and engaged in regular exercise to help keep your blood sugars in a normal range. Please plan to repeat this test in 6 months. Please call me if you have any questions: [phone] Sincerely, Rodrigo Adams MD

## 2018-11-13 NOTE — PROGRESS NOTES
HISTORY OF PRESENT ILLNESS Humberto Gustafson is a 58 y.o. female. HPI Follow up on chronic medical problems. Cardiovascular Review: 
The patient has hypertension and hyperlipidemia. Diet and Lifestyle: generally follows a low fat low cholesterol diet, generally follows a low sodium diet, follows a diabetic diet regularly, exercises sporadically Home BP Monitoring: is not measured at home. Pertinent ROS: taking medications as instructed, no medication side effects noted, no TIA's, no chest pain on exertion, no dyspnea on exertion, slight swelling of ankles. DM type II follow up: 
Diet controlled. Compliant w/ diabetic diet, and exercise. Has not been cheking BS recently. Denies any tingling sensation, polyuria and polydipsia. No blurred vision. No significant weight changes. Feeling well since last OV. Depression Review: 
Patient is seen for followup of depression. Treatment includes Celexa. Ongoing symptoms include fatigue. She denies depressed mood, insomnia and hopelessness. She experiences the following side effects from the treatment: none. Patient Active Problem List  
Diagnosis Code  Environmental allergies Z91.09  
 Depression F32.9  C section  H/O: hysterectomy Z90.710  
 Hypovitaminosis D E55.9  Mixed hyperlipidemia E78.2  Benign essential hematuria R31.9  Diet-controlled diabetes mellitus ([facility] [address] E11.9  Hypertensive retinopathy H35.039  
 Encounter for medication monitoring Z51.81  
 At high risk for breast cancer Z91.89  
 Obesity, morbid (HCC) E66.01  
 FH: colon cancer Z80.0 Current Outpatient Medications Medication Sig Dispense Refill  fluticasone (FLONASE) 50 mcg/actuation nasal spray SPRAY TWO SPRAYS IN EACH NOSTRIL ONCE DAILY 1 Bottle 11  
 topiramate (TOPAMAX) 50 mg tablet TAKE ONE TABLET BY MOUTH TWICE A DAY 60 Tab 11  
 levocetirizine (XYZAL) 5 mg tablet TAKE ONE TABLET BY MOUTH DAILY 30 Tab 11  
  losartan-hydroCHLOROthiazide (HYZAAR) 50-12.5 mg per tablet TAKE ONE TABLET BY MOUTH DAILY 90 Tab 2  
 omeprazole (PRILOSEC) 40 mg capsule Take 1 Cap by mouth daily. 90 Cap 3  furosemide (LASIX) 20 mg tablet TAKE ONE TABLET BY MOUTH DAILY 90 Tab 3  
 citalopram (CELEXA) 20 mg tablet TAKE ONE TABLET BY MOUTH DAILY 90 Tab 2  ALPRAZolam (XANAX) 0.5 mg tablet TAKE ONE TABLET BY MOUTH AT NIGHTLY AS NEEDED 90 Tab 0  pravastatin (PRAVACHOL) 20 mg tablet TAKE ONE TABLET BY MOUTH EVERY NIGHT AT BEDTIME 90 Tab 2  
 albuterol (PROAIR HFA) 90 mcg/actuation inhaler Take 2 Puffs by inhalation every four (4) hours as needed for Wheezing or Shortness of Breath. Please instruct Pt on how to use an inhaler. 1 Inhaler 3  
 melatonin 3 mg tablet Take 3 mg by mouth nightly as needed.  acetaminophen (TYLENOL ARTHRITIS PAIN) 650 mg CR tablet Take 1,300 mg by mouth every six (6) hours as needed for Pain.  MULTIVITAMIN PO Take 1 Tab by mouth daily.  aspirin 81 mg chewable tablet Take 81 mg by mouth daily.  CALCIUM CARB/VIT D3/MINERALS (CALTRATE PLUS PO) Take 1 Tab by mouth two (2) times a week. Allergies Allergen Reactions  Ciprofloxacin Other (comments) Insomnia and hot flashes  Demerol [Meperidine] Other (comments)  
  hallucinations  Doxycycline Unknown (comments) \"Feeling flushed\"  Floxin [Ofloxacin] Other (comments)  
  insomnia  Lisinopril Cough  Phenergan-Codeine [Promethazine-Codeine] Itching Patient stated she started itching when she was tapering off of medication.  Prednisone Other (comments) Has feelings of depression, \"weird thoughts\" Past Medical History:  
Diagnosis Date  C section 3/12/2010  Depression 3/12/2010  Diabetes ([facility] [address]  Environmental allergies 3/12/2010  
 H/O: hysterectomy 3/12/2010  High cholesterol 3/12/2010  
 HTN (hypertension) 3/12/2010  Pneumonia 04/2017 Past Surgical History: Procedure Laterality Date  ENDOSCOPY, COLON, DIAGNOSTIC  2006  
 repeat 10 yrs (Dr. Gael Mcbride)  HX BREAST BIOPSY  1980  
 both breast  
 HX BREAST BIOPSY  1986 left breast  
 HX  SECTION    
 x 3  
 HX CORNEAL TRANSPLANT  2011  
 left eye-   
 HX CYSTOSTOMY  3/2015  HX HYSTERECTOMY  1998 Family History Problem Relation Age of Onset  Hypertension Mother  Other Mother   
     obesity, chf  
 Heart Disease Mother CHF  Kidney Disease Mother  High Cholesterol Mother  Cancer Father   
     lung  Breast Cancer Sister  MS Sister  High Cholesterol Sister  Hypertension Sister  Hypertension Brother  Liver Disease Brother Hep C  
 High Cholesterol Brother  Cancer Brother   
     colon  Hypertension Sister  Diabetes Sister  High Cholesterol Sister  Hypertension Sister  Other Sister   
     obese  High Cholesterol Sister Social History Tobacco Use  Smoking status: Never Smoker  Smokeless tobacco: Never Used Substance Use Topics  Alcohol use: Yes Alcohol/week: 0.0 oz  
  Comment: occasional  
 
  
Lab Results Component Value Date/Time WBC 12.7 (H) 01/15/2018 10:45 AM  
 HGB 14.2 01/15/2018 10:45 AM  
 Hemoglobin (POC) 15.0 2016 05:26 PM  
 HCT 42.9 01/15/2018 10:45 AM  
 Hematocrit (POC) 44 2016 05:26 PM  
 PLATELET 760  10:45 AM  
 MCV 90 01/15/2018 10:45 AM  
 
Lab Results Component Value Date/Time Cholesterol, total 162 05/15/2018 08:45 AM  
 HDL Cholesterol 38 (L) 05/15/2018 08:45 AM  
 LDL, calculated 98 05/15/2018 08:45 AM  
 Triglyceride 131 05/15/2018 08:45 AM  
 CHOL/HDL Ratio 4.9 2010 11:06 AM  
 
Lab Results Component Value Date/Time TSH 1.430 2011 11:04 AM  
  
Lab Results Component Value Date/Time  Sodium 143 05/15/2018 08:45 AM  
 Potassium 3.8 05/15/2018 08:45 AM  
 Chloride 104 05/15/2018 08:45 AM  
 CO2 24 05/15/2018 08:45 AM  
 Anion gap 5 03/11/2015 01:35 PM  
 Glucose 108 (H) 05/15/2018 08:45 AM  
 BUN 10 05/15/2018 08:45 AM  
 Creatinine 0.71 05/15/2018 08:45 AM  
 BUN/Creatinine ratio 14 05/15/2018 08:45 AM  
 GFR est  05/15/2018 08:45 AM  
 GFR est non-AA 90 05/15/2018 08:45 AM  
 Calcium 9.4 05/15/2018 08:45 AM  
 Bilirubin, total 0.4 05/15/2018 08:45 AM  
 ALT (SGPT) 13 05/15/2018 08:45 AM  
 AST (SGOT) 11 05/15/2018 08:45 AM  
 Alk. phosphatase 55 05/15/2018 08:45 AM  
 Protein, total 5.9 (L) 05/15/2018 08:45 AM  
 Albumin 4.0 05/15/2018 08:45 AM  
 Globulin 3.5 03/11/2015 01:35 PM  
 A-G Ratio 2.1 05/15/2018 08:45 AM  
  
Lab Results Component Value Date/Time Hemoglobin A1c 6.7 (H) 10/15/2013 12:55 PM  
 Hemoglobin A1c (POC) 6.1 05/15/2018 08:40 AM  
   
 
Review of Systems Constitutional: Negative for malaise/fatigue. HENT: Negative for congestion. Eyes: Negative for blurred vision. Respiratory: Negative for cough and shortness of breath. Cardiovascular: Negative for chest pain, palpitations and leg swelling. Gastrointestinal: Negative for abdominal pain, constipation and heartburn. Genitourinary: Negative for dysuria, frequency and urgency. Musculoskeletal: Negative for back pain and joint pain. Neurological: Negative for dizziness, tingling and headaches. Endo/Heme/Allergies: Negative for environmental allergies. Psychiatric/Behavioral: Negative for depression. The patient does not have insomnia. Physical Exam  
Constitutional: She appears well-developed and well-nourished. /60 (BP 1 Location: Left arm, BP Patient Position: Sitting)   Pulse 76   Temp 97.9 °F (36.6 °C) (Oral)   Resp 18   Ht 5' 6\" (1.676 m)   Wt 249 lb 6.4 oz (113.1 kg)   LMP 11/19/1998   SpO2 96%    L/min   BMI 40.25 kg/m² HENT:  
Right Ear: Tympanic membrane and ear canal normal.  
Left Ear: Tympanic membrane and ear canal normal.  
 Nose: No mucosal edema or rhinorrhea. Mouth/Throat: Oropharynx is clear and moist and mucous membranes are normal.  
Neck: Normal range of motion. Neck supple. No thyromegaly present. Cardiovascular: Normal rate, regular rhythm and normal heart sounds. Exam reveals no gallop. Pulmonary/Chest: Effort normal and breath sounds normal.  
Abdominal: Soft. Normal appearance and bowel sounds are normal. She exhibits no mass. There is no tenderness. Musculoskeletal: Normal range of motion. She exhibits edema (mild). She exhibits no tenderness. Lymphadenopathy:  
  She has no cervical adenopathy. Skin: Skin is warm and dry. Psychiatric: She has a normal mood and affect. Nursing note and vitals reviewed. ASSESSMENT and PLAN Diagnoses and all orders for this visit: 1. Essential hypertension Discussed sodium restriction, high k rich diet, maintaining ideal body weight and regular exercise program such as daily walking 30 min perday 4-5 times per week, as physiologic means to achieve blood pressure control.  Medication compliance advised. 2. Mixed hyperlipidemia Stable on pravastatin. 3. Diet-controlled diabetes mellitus ([facility] -     AMB POC GLUCOSE, QUANTITATIVE, BLOOD 
-     AMB POC HEMOGLOBIN A1C 4. Depression, unspecified depression type Stable on celexa. 5. Environmental allergies 
-     fluticasone (FLONASE) 50 mcg/actuation nasal spray; SPRAY TWO SPRAYS IN EACH NOSTRIL ONCE DAILY 
-     levocetirizine (XYZAL) 5 mg tablet; TAKE ONE TABLET BY MOUTH DAILY 6. Obesity, morbid ([facility] [address] -     topiramate (TOPAMAX) 50 mg tablet; TAKE ONE TABLET BY MOUTH TWICE A DAY Feels that the medication is helping to control her craving. Overall her weight is down by 10 pounds. Counseled on goal for exercise of eventual goal of 30-60 minutes 5-7 times a week as per AHA guidelines.   
Decrease carbohydrates (white foods, sweet foods, sweet drinks and alcohol), increase green leafy vegetables and protein (lean meats and beans). Avoid fried foods. Increase water intake. Eat 3-5 small meals daily. Increase physical activity. 7. Encounter for medication monitoring -     METABOLIC PANEL, BASIC 8. Encounter for screening mammogram for breast cancer -     SHAWN 3D JULIÁN W MAMMO BI SCREENING INCL CAD; Future 9. At high risk for breast cancer 
-     REFERRAL TO BREAST SURGERY 10. Screening for osteoporosis -     DEXA BONE DENSITY STUDY AXIAL; Future 11. Encounter for immunization 
-     INFLUENZA VIRUS VAC QUAD,SPLIT,PRESV FREE SYRINGE IM 
-     MD IMMUNIZ ADMIN,1 SINGLE/COMB VAC/TOXOID 12. Need for shingles vaccine 
-     varicella-zoster recombinant, PF, (SHINGRIX) 50 mcg/0.5 mL susr injection; 0.5 mL by IntraMUSCular route once for 1 dose. Repeat second dose in 6 months. Follow-up Disposition: 
Return in about 6 months (around 5/13/2019) for medicare wellness exam. 
reviewed diet, exercise and weight control 
cardiovascular risk and specific lipid/LDL goals reviewed 
reviewed medications and side effects in detail 
specific diabetic recommendations: low cholesterol diet, weight control and daily exercise discussed and glycohemoglobin and other lab monitoring discussed I have discussed diagnosis listed in this note with pt and/or family. I have discussed treatment plans and options and the risk/benefit analysis of those options, including safe use of medications and possible medication side effects. Through the use of shared decision making we have agreed to the above plan. The patient has received an after-visit summary and questions were answered concerning future plans and follow up. Advise pt of any urgent changes then to proceed to the ER.

## 2018-11-13 NOTE — PROGRESS NOTES
Chief Complaint Patient presents with  Hypertension  
  follow up  Cholesterol Problem  
  follow up Mammogram 9/7/2017 Colonoscopy 4/2/2015 by Dr. Shirley Soni- repeat in 10 years 1. Have you been to the ER, urgent care clinic since your last visit? Hospitalized since your last visit? Yes 9/23/2018 for cough and congestion 2. Have you seen or consulted any other health care providers outside of the [address] [place] [address] since your last visit? Include any pap smears or colon screening. No 
 
Verbal order received per Dr. Sukh Cabezas- flu vaccine IM- Mil Boyd Pt received flu vaccine IM in left deltoid without any difficulty.

## 2018-11-14 LAB
BUN SERPL-MCNC: 16 MG/DL (ref 8–27)
BUN/CREAT SERPL: 17 (ref 12–28)
CALCIUM SERPL-MCNC: 9.5 MG/DL (ref 8.7–10.3)
CHLORIDE SERPL-SCNC: 106 MMOL/L (ref 96–106)
CHOLEST SERPL-MCNC: 168 MG/DL (ref 100–199)
CO2 SERPL-SCNC: 25 MMOL/L (ref 20–29)
CREAT SERPL-MCNC: 0.96 MG/DL (ref 0.57–1)
GLUCOSE SERPL-MCNC: 97 MG/DL (ref 65–99)
HDLC SERPL-MCNC: 38 MG/DL
INTERPRETATION, 910389: NORMAL
LDLC SERPL CALC-MCNC: 100 MG/DL (ref 0–99)
POTASSIUM SERPL-SCNC: 3.8 MMOL/L (ref 3.5–5.2)
SODIUM SERPL-SCNC: 145 MMOL/L (ref 134–144)
TRIGL SERPL-MCNC: 150 MG/DL (ref 0–149)
VLDLC SERPL CALC-MCNC: 30 MG/DL (ref 5–40)

## 2018-11-19 RX ORDER — PRAVASTATIN SODIUM 20 MG/1
TABLET ORAL
Qty: 90 TAB | Refills: 1 | Status: SHIPPED | OUTPATIENT
Start: 2018-11-19 | End: 2019-05-24 | Stop reason: SDUPTHER

## 2018-11-24 DIAGNOSIS — F32.A DEPRESSION, UNSPECIFIED DEPRESSION TYPE: ICD-10-CM

## 2018-11-25 RX ORDER — CITALOPRAM 20 MG/1
TABLET, FILM COATED ORAL
Qty: 90 TAB | Refills: 1 | Status: SHIPPED | OUTPATIENT
Start: 2018-11-25 | End: 2019-05-28 | Stop reason: SDUPTHER

## 2018-11-27 ENCOUNTER — DOCUMENTATION ONLY (OUTPATIENT)
Dept: FAMILY MEDICINE CLINIC | Age: 63
End: 2018-11-27

## 2018-12-06 ENCOUNTER — OFFICE VISIT (OUTPATIENT)
Dept: SURGERY | Age: 63
End: 2018-12-06

## 2018-12-06 ENCOUNTER — TELEPHONE (OUTPATIENT)
Dept: SURGERY | Age: 63
End: 2018-12-06

## 2018-12-06 VITALS
HEART RATE: 70 BPM | HEIGHT: 66 IN | BODY MASS INDEX: 40.02 KG/M2 | DIASTOLIC BLOOD PRESSURE: 70 MMHG | SYSTOLIC BLOOD PRESSURE: 131 MMHG | WEIGHT: 249 LBS

## 2018-12-06 DIAGNOSIS — Z91.89 AT HIGH RISK FOR BREAST CANCER: ICD-10-CM

## 2018-12-06 DIAGNOSIS — N60.12 FIBROCYSTIC BREAST CHANGES OF BOTH BREASTS: Primary | ICD-10-CM

## 2018-12-06 DIAGNOSIS — Z80.3 FAMILY HISTORY OF BREAST CANCER: ICD-10-CM

## 2018-12-06 DIAGNOSIS — N60.11 FIBROCYSTIC BREAST CHANGES OF BOTH BREASTS: Primary | ICD-10-CM

## 2018-12-06 NOTE — PATIENT INSTRUCTIONS
Learning About Breast Cancer Screening  What is breast cancer screening? Breast cancer occurs when cells that are not normal grow in one or both of your breasts. Screening tests can help find breast cancer early. Cancer is easier to treat when it's found early. Having concerns about breast cancer is common. That's why it's important to talk with your doctor about when to start and how often to get screened for breast cancer. How is breast cancer screening done? Several screening tests can be used to check for breast cancer. · Mammograms check for signs of cancer using X-rays. They can show tumors that are too small for you or your doctor to feel. During a mammogram, a machine squeezes your breasts to make them flatter and easier to X-ray. At least two pictures are taken of each breast. One is taken from the top and one from the side. · 3-D mammograms are also called digital breast tomosynthesis. Your breast is positioned on a flat plate. A top plate is pressed against your breast to keep it in position. The X-ray arm then moves in an arc above the breast and takes many pictures. A computer uses these X-rays to create a three-dimensional image. · Clinical breast exams are a doctor's exam. Your doctor carefully feels your breasts and under your arms to check for lumps or other changes. After the screening, your doctor will tell you the results. You will also be told if you need any follow-up tests. When should you get screened? Talk with your doctor about when you should start being tested for breast cancer. How often you get tested and the kind of tests you get will depend on your age and your risk. The guidelines that follow are for women who have an average risk for breast cancer. If you have a higher risk for breast cancer, such as having a family history of breast cancer in multiple relatives or at a young age, your doctor may recommend different screening for you.   · Ages 21 to 44: Some experts recommend that women have a clinical breast exam every 3 years, starting at age 21. Ask your doctor how often you should have this test. If you have a high risk for breast cancer, talk with your doctor about when to start yearly mammograms and other screening tests. · Ages 36 and older: Talk with your doctor about how often you should have mammograms and clinical breast exams. What is your risk for breast cancer? If you don't already know your risk of breast cancer, you can ask your doctor about it. You can also look it up at www.cancer.gov/bcrisktool/. If your doctor says that you have a high or very high risk, ask about ways to reduce your risk. These could include getting extra screening, taking medicine, or having surgery. If you have a strong family history of breast cancer, ask your doctor about genetic testing. What steps can you take to stay healthy? Some things that increase your risk of breast cancer, such as your age and being female, cannot be controlled. But you can do some things to stay as healthy as you can. · Learn what your breasts normally look and feel like. If you notice any changes, tell your doctor. · Drink alcohol wisely. Your risk goes up the more you drink. For the best health, women should have no more than 1 drink a day or 7 drinks a week. · If you smoke, quit. When you quit smoking, you lower your chances of getting many types of cancer. You can also do your best to eat well, be active, and stay at a healthy weight. Eating healthy foods and being active every day, as well as staying at a healthy weight, may help prevent cancer. Where can you learn more? Go to http://royal-maeve.info/. Enter I784 in the search box to learn more about \"Learning About Breast Cancer Screening. \"  Current as of: March 28, 2018  Content Version: 11.8  © 0963-5525 [employer].  Care instructions adapted under license by [employer] (which disclaims liability or warranty for this information). If you have questions about a medical condition or this instruction, always ask your healthcare professional. [place] [address] any warranty or liability for your use of this information.

## 2018-12-06 NOTE — PROGRESS NOTES
HISTORY OF PRESENT ILLNESS  Emelina Nielsen is a 58 y.o. female. HPI ESTABLISHED patient here for high risk screening for breast cancer. No breast problems or concerns at this time. She is interested in having future mammogram done at [facility]. Breast history-  History of bilateral breast biopsies, two on the LEFT and one on the RIGHT, all with benign pathology.     Using the Tyrer-Cuzick model, calculated pt's lifetime risk at 28% for breast cancer (calculated 10/2017). OB History     Obstetric Comments    Menarche:  14. LMP: 43.  # of Children:  3. Age at Delivery of First Child:  24.   Hysterectomy/oophorectomy:  YES/YES. Breast Bx:  Yes. Hx of Breast Feeding:  No.  BCP:  Yes, in the past. Hormone therapy:  Briefly in the past.           Past Surgical History:   Procedure Laterality Date    ENDOSCOPY, COLON, DIAGNOSTIC      repeat 10 yrs (Dr. Roman Almendarez)    HX BREAST BIOPSY  1980    both breast    HX BREAST BIOPSY  1986    left breast    HX  SECTION      x 3    HX CORNEAL TRANSPLANT  2011    left eye-     HX CYSTOSTOMY  3/2015    HX HYSTERECTOMY  1998     Family history-  Sister diagnosed with breast CA at age 32.    A niece had cervical cancer. Breast imaging-  Breast MRI (done at [facility]) 2017: BI-RADS 1. Mammogram ([facility]) 2017. ROS    Physical Exam   Constitutional: She appears well-developed and well-nourished. Pulmonary/Chest: Right breast exhibits no inverted nipple, no mass, no nipple discharge, no skin change and no tenderness. Left breast exhibits no inverted nipple, no mass, no nipple discharge, no skin change and no tenderness. Breasts are symmetrical.   Musculoskeletal: Normal range of motion. UE x 2   Lymphadenopathy:     She has no axillary adenopathy. Skin: Skin is warm, dry and intact. Chest and breasts examined   Psychiatric: She has a normal mood and affect.  Her speech is normal and behavior is normal. Visit Vitals  /70   Pulse 70   Ht 5' 6\" (1.676 m)   Wt 249 lb (112.9 kg)   LMP 11/19/1998   BMI 40.19 kg/m²     ASSESSMENT and PLAN  Encounter Diagnoses   Name Primary?  Fibrocystic breast changes of both breasts Yes    At high risk for breast cancer     Family history of breast cancer      Normal exam with no evidence of breast malignancy. Due for mammogram.  Discussed 3D vs 2D mammogram and she will have BSmammogram 3D. Also due for breast MRI since high risk. She will have this as well. RTC in 1 year or sooner PRN. We will continue to evaluate if a breast MRI is necessary yearly. She is comfortable with this plan. All questions answered and she stated understanding.

## 2018-12-06 NOTE — TELEPHONE ENCOUNTER
Please schedule for breast MRI - high risk. Lifetime risk is greater than 20%. Bilateral mammogram is being scheduled as well. She would like to have all breast imaging done through [facility].

## 2018-12-12 NOTE — TELEPHONE ENCOUNTER
Breast MRI 1/2/19:    Appointments for this Order     1/2/2019 1330  - 45 min [facility] MRI 2 ([facility]) [facility] Rad [facility]ri

## 2018-12-19 ENCOUNTER — HOSPITAL ENCOUNTER (OUTPATIENT)
Dept: MAMMOGRAPHY | Age: 63
Discharge: HOME | End: 2018-12-19
Attending: NURSE PRACTITIONER
Payer: COMMERCIAL

## 2018-12-19 DIAGNOSIS — Z12.39 SCREENING BREAST EXAMINATION: ICD-10-CM

## 2018-12-19 PROCEDURE — 77063 BREAST TOMOSYNTHESIS BI: CPT

## 2019-01-02 ENCOUNTER — HOSPITAL ENCOUNTER (OUTPATIENT)
Dept: MRI IMAGING | Age: 64
Discharge: HOME | End: 2019-01-02
Attending: NURSE PRACTITIONER
Payer: COMMERCIAL

## 2019-01-02 DIAGNOSIS — Z91.89 AT HIGH RISK FOR BREAST CANCER: ICD-10-CM

## 2019-01-02 DIAGNOSIS — N60.12 FIBROCYSTIC BREAST CHANGES OF BOTH BREASTS: ICD-10-CM

## 2019-01-02 DIAGNOSIS — Z80.3 FAMILY HISTORY OF BREAST CANCER: ICD-10-CM

## 2019-01-02 DIAGNOSIS — N60.11 FIBROCYSTIC BREAST CHANGES OF BOTH BREASTS: ICD-10-CM

## 2019-01-02 PROCEDURE — A9585 GADOBUTROL INJECTION: HCPCS | Performed by: NURSE PRACTITIONER

## 2019-01-02 PROCEDURE — 77049 MRI BREAST C-+ W/CAD BI: CPT

## 2019-01-02 PROCEDURE — C8908 MRI W/O FOL W/CONT, BREAST,: HCPCS

## 2019-01-02 PROCEDURE — 74011000258 HC RX REV CODE- 258: Performed by: NURSE PRACTITIONER

## 2019-01-02 PROCEDURE — 74011250636 HC RX REV CODE- 250/636: Performed by: NURSE PRACTITIONER

## 2019-01-02 RX ADMIN — GADOBUTROL 11 ML: 604.72 INJECTION INTRAVENOUS at 14:48

## 2019-01-02 RX ADMIN — SODIUM CHLORIDE 100 ML: 900 INJECTION, SOLUTION INTRAVENOUS at 14:49

## 2019-01-03 ENCOUNTER — TELEPHONE (OUTPATIENT)
Dept: SURGERY | Age: 64
End: 2019-01-03

## 2019-01-03 NOTE — TELEPHONE ENCOUNTER
Called patient. No answer; left message per HIPPA. Breast MRI normal.  Follow-up with BSmammogram and office visit in 12/2019 or contact office sooner PRN.

## 2019-03-04 NOTE — TELEPHONE ENCOUNTER
Advised patient to check with pharmacy to see if her particular lot number was involved in recall , she agreed.

## 2019-03-04 NOTE — TELEPHONE ENCOUNTER
----- Message from Shantel Pacheco sent at 3/4/2019  7:22 AM EST -----  Regarding: [facility]  Pt stated there is a recall for Losartan Hydroclodize  and she is requesting a different medication called to [facility]. She stopped taking it on Friday. Pts number is  [phone] or cell [phone].

## 2019-05-28 DIAGNOSIS — F32.A DEPRESSION, UNSPECIFIED DEPRESSION TYPE: ICD-10-CM

## 2019-05-28 RX ORDER — CITALOPRAM 20 MG/1
TABLET, FILM COATED ORAL
Qty: 90 TAB | Refills: 0 | Status: SHIPPED | OUTPATIENT
Start: 2019-05-28 | End: 2019-08-25 | Stop reason: SDUPTHER

## 2019-06-04 ENCOUNTER — OFFICE VISIT (OUTPATIENT)
Dept: FAMILY MEDICINE CLINIC | Age: 64
End: 2019-06-04

## 2019-06-04 VITALS
OXYGEN SATURATION: 97 % | TEMPERATURE: 97.1 F | HEART RATE: 69 BPM | BODY MASS INDEX: 40.21 KG/M2 | HEIGHT: 66 IN | DIASTOLIC BLOOD PRESSURE: 80 MMHG | WEIGHT: 250.2 LBS | SYSTOLIC BLOOD PRESSURE: 139 MMHG | RESPIRATION RATE: 16 BRPM

## 2019-06-04 DIAGNOSIS — F32.A MILD DEPRESSION: ICD-10-CM

## 2019-06-04 DIAGNOSIS — Z00.00 WELCOME TO MEDICARE PREVENTIVE VISIT: Primary | ICD-10-CM

## 2019-06-04 DIAGNOSIS — E11.9 DIET-CONTROLLED DIABETES MELLITUS (HCC): ICD-10-CM

## 2019-06-04 DIAGNOSIS — B37.31 YEAST VAGINITIS: ICD-10-CM

## 2019-06-04 DIAGNOSIS — M25.511 RIGHT SHOULDER PAIN, UNSPECIFIED CHRONICITY: ICD-10-CM

## 2019-06-04 DIAGNOSIS — R82.90 NONSPECIFIC FINDINGS ON EXAMINATION OF URINE: ICD-10-CM

## 2019-06-04 DIAGNOSIS — R53.83 FATIGUE, UNSPECIFIED TYPE: ICD-10-CM

## 2019-06-04 DIAGNOSIS — B35.6 TINEA CRURIS: ICD-10-CM

## 2019-06-04 DIAGNOSIS — Z12.11 ENCOUNTER FOR SCREENING FECAL OCCULT BLOOD TESTING: ICD-10-CM

## 2019-06-04 DIAGNOSIS — R06.02 SOB (SHORTNESS OF BREATH): ICD-10-CM

## 2019-06-04 DIAGNOSIS — Z01.89 NEEDS SLEEP APNEA ASSESSMENT: ICD-10-CM

## 2019-06-04 DIAGNOSIS — I10 ESSENTIAL HYPERTENSION: ICD-10-CM

## 2019-06-04 DIAGNOSIS — E78.2 MIXED HYPERLIPIDEMIA: ICD-10-CM

## 2019-06-04 DIAGNOSIS — Z51.81 ENCOUNTER FOR MEDICATION MONITORING: ICD-10-CM

## 2019-06-04 DIAGNOSIS — E66.01 OBESITY, MORBID (HCC): ICD-10-CM

## 2019-06-04 DIAGNOSIS — Z91.09 ENVIRONMENTAL ALLERGIES: ICD-10-CM

## 2019-06-04 DIAGNOSIS — Z23 NEED FOR SHINGLES VACCINE: ICD-10-CM

## 2019-06-04 DIAGNOSIS — Z23 ENCOUNTER FOR IMMUNIZATION: ICD-10-CM

## 2019-06-04 LAB
BILIRUB UR QL STRIP: NEGATIVE
GLUCOSE POC: 84 MG/DL
GLUCOSE UR-MCNC: NEGATIVE MG/DL
HBA1C MFR BLD HPLC: 6 %
HEMOCCULT STL QL: NEGATIVE
KETONES P FAST UR STRIP-MCNC: NEGATIVE MG/DL
PH UR STRIP: 7 [PH] (ref 4.6–8)
PROT UR QL STRIP: NEGATIVE
SP GR UR STRIP: 1.01 (ref 1–1.03)
UA UROBILINOGEN AMB POC: NORMAL (ref 0.2–1)
URINALYSIS CLARITY POC: CLEAR
URINALYSIS COLOR POC: YELLOW
URINE BLOOD POC: NORMAL
URINE LEUKOCYTES POC: NORMAL
URINE NITRITES POC: NEGATIVE
VALID INTERNAL CONTROL?: YES
WET MOUNT POCT, WMPOCT: NORMAL

## 2019-06-04 RX ORDER — DICLOFENAC SODIUM 75 MG/1
75 TABLET, DELAYED RELEASE ORAL
Qty: 30 TAB | Refills: 1 | Status: SHIPPED | OUTPATIENT
Start: 2019-06-04 | End: 2020-06-17 | Stop reason: ALTCHOICE

## 2019-06-04 RX ORDER — FLUCONAZOLE 150 MG/1
150 TABLET ORAL DAILY
Qty: 2 TAB | Refills: 0 | Status: SHIPPED | OUTPATIENT
Start: 2019-06-04 | End: 2019-06-06

## 2019-06-04 RX ORDER — KETOCONAZOLE 20 MG/G
CREAM TOPICAL
Qty: 30 G | Refills: 0 | Status: SHIPPED | OUTPATIENT
Start: 2019-06-04 | End: 2019-10-29 | Stop reason: SDUPTHER

## 2019-06-04 NOTE — PROGRESS NOTES
Order placed for Prevnar 13  per Verbal Order from Dr. Jeremy Hubbard on 6/4/2019 due to need    Prevnar 13 0.5 ml, given in left arm, IM no reaction noted. Girish Peace

## 2019-06-04 NOTE — PROGRESS NOTES
This is a \"Welcome to [employer]"  Initial Preventive Physical Examination (IPPE) providing Personalized Prevention Plan Services (Performed in the first 12 months of enrollment)    I have reviewed the patient's medical history in detail and updated the computerized patient record. Cardiovascular Review:  The patient has hypertension and hyperlipidemia. Diet and Lifestyle: generally follows a low fat low cholesterol diet, generally follows a low sodium diet, follows a diabetic diet regularly, exercises sporadically  Home BP Monitoring: is not measured at home. Pertinent ROS: taking medications as instructed, no medication side effects noted, no TIA's, no chest pain on exertion, no dyspnea on exertion, slight swelling of ankles. DM type II follow up:  Diet controlled. Compliant w/ diabetic diet, and exercise. Has not been cheking BS recently. Denies any tingling sensation, polyuria and polydipsia. No blurred vision. No significant weight changes. Feeling well since last OV. Depression Review:  Patient is seen for followup of depression. Treatment includes Celexa. Ongoing symptoms include fatigue. She denies depressed mood, insomnia and hopelessness. She experiences the following side effects from the treatment: none. History     Patient Active Problem List   Diagnosis Code    Environmental allergies Z91.09    Depression F32.9    C section     H/O: hysterectomy Z90.710    Hypovitaminosis D E55.9    Mixed hyperlipidemia E78.2    Benign essential hematuria R31.9    Diet-controlled diabetes mellitus ([address] E11.9    Hypertensive retinopathy H35.039    Encounter for medication monitoring Z51.81    At high risk for breast cancer Z91.89    Obesity, morbid ([facility] [address] E66.01    FH: colon cancer Z80.0       Current Outpatient Medications   Medication Sig Dispense Refill    diclofenac EC (VOLTAREN) 75 mg EC tablet Take 1 Tab by mouth two (2) times daily as needed for Pain (shoulder pain).  Take with food. 30 Tab 1    ketoconazole (NIZORAL) 2 % topical cream Apply  to affected area two (2) times daily as needed for Skin Irritation or Itching. 30 g 0    fluconazole (DIFLUCAN) 150 mg tablet Take 1 Tab by mouth daily for 2 days. Do not take your celexa for the 2 days of taking this medication. 2 Tab 0    citalopram (CELEXA) 20 mg tablet TAKE ONE TABLET BY MOUTH DAILY 90 Tab 0    losartan-hydroCHLOROthiazide (HYZAAR) 50-12.5 mg per tablet TAKE ONE TABLET BY MOUTH DAILY 90 Tab 3    pravastatin (PRAVACHOL) 20 mg tablet TAKE ONE TABLET BY MOUTH EVERY NIGHT AT BEDTIME 90 Tab 3    fluticasone (FLONASE) 50 mcg/actuation nasal spray SPRAY TWO SPRAYS IN EACH NOSTRIL ONCE DAILY 1 Bottle 11    topiramate (TOPAMAX) 50 mg tablet TAKE ONE TABLET BY MOUTH TWICE A DAY 60 Tab 11    levocetirizine (XYZAL) 5 mg tablet TAKE ONE TABLET BY MOUTH DAILY 30 Tab 11    cholecalciferol (VITAMIN D3) 1,000 unit tablet Take 1,000 Units by mouth daily.  cyanocobalamin (VITAMIN B-12) 1,000 mcg tablet Take 1,000 mcg by mouth daily.  omeprazole (PRILOSEC) 40 mg capsule Take 1 Cap by mouth daily. 90 Cap 3    furosemide (LASIX) 20 mg tablet TAKE ONE TABLET BY MOUTH DAILY 90 Tab 3    ALPRAZolam (XANAX) 0.5 mg tablet TAKE ONE TABLET BY MOUTH AT NIGHTLY AS NEEDED 90 Tab 0    albuterol (PROAIR HFA) 90 mcg/actuation inhaler Take 2 Puffs by inhalation every four (4) hours as needed for Wheezing or Shortness of Breath. Please instruct Pt on how to use an inhaler. 1 Inhaler 3    melatonin 3 mg tablet Take 3 mg by mouth nightly as needed.  acetaminophen (TYLENOL ARTHRITIS PAIN) 650 mg CR tablet Take 1,300 mg by mouth every six (6) hours as needed for Pain.  MULTIVITAMIN PO Take 1 Tab by mouth daily.  aspirin 81 mg chewable tablet Take 81 mg by mouth daily.  CALCIUM CARB/VIT D3/MINERALS (CALTRATE PLUS PO) Take 1 Tab by mouth every seven (7) days.       loratadine (CLARITIN) 10 mg tablet Take 10 mg by mouth daily as needed for Allergies. Allergies   Allergen Reactions    Ciprofloxacin Other (comments)     Insomnia and hot flashes    Demerol [Meperidine] Other (comments)     hallucinations    Doxycycline Unknown (comments)       \"Feeling flushed\"    Floxin [Ofloxacin] Other (comments)     insomnia    Lisinopril Cough    Phenergan-Codeine [Promethazine-Codeine] Itching     Patient stated she started itching when she was tapering off of medication.  Prednisone Other (comments)     Has feelings of depression, \"weird thoughts\"       Past Medical History:   Diagnosis Date    C section 3/12/2010    Depression 3/12/2010    Diabetes ([facility] [address]     Environmental allergies 3/12/2010    H/O: hysterectomy 3/12/2010    High cholesterol 3/12/2010    HTN (hypertension) 3/12/2010    Pneumonia 2017       Past Surgical History:   Procedure Laterality Date    ENDOSCOPY, COLON, DIAGNOSTIC  2006    repeat 10 yrs (Dr. Ralf Felix)    HX BREAST BIOPSY  1980    both breast    HX BREAST BIOPSY  1986    left breast    HX  SECTION      x 3    HX CORNEAL TRANSPLANT  2011    left eye-     HX CYSTOSTOMY  3/2015    HX HYSTERECTOMY  1998       Family History   Problem Relation Age of Onset    Hypertension Mother     Other Mother         obesity, chf    Heart Disease Mother         CHF    Kidney Disease Mother     High Cholesterol Mother     Cancer Father         lung    Breast Cancer Sister     MS Sister     High Cholesterol Sister     Hypertension Sister     Hypertension Brother     Liver Disease Brother         Hep C    High Cholesterol Brother     Cancer Brother         colon    Hypertension Sister     Diabetes Sister     High Cholesterol Sister     Hypertension Sister     Other Sister         obese    High Cholesterol Sister        Social History     Tobacco Use    Smoking status: Never Smoker    Smokeless tobacco: Never Used   Substance Use Topics    Alcohol use:  Yes Alcohol/week: 0.0 oz     Comment: occasional       Lab Results   Component Value Date/Time    WBC 12.7 (H) 01/15/2018 10:45 AM    HGB 14.2 01/15/2018 10:45 AM    Hemoglobin (POC) 15.0 09/29/2016 05:26 PM    HCT 42.9 01/15/2018 10:45 AM    Hematocrit (POC) 44 09/29/2016 05:26 PM    PLATELET 969 58/19/4604 10:45 AM    MCV 90 01/15/2018 10:45 AM     Lab Results   Component Value Date/Time    Cholesterol, total 168 11/13/2018 09:04 AM    HDL Cholesterol 38 (L) 11/13/2018 09:04 AM    LDL, calculated 100 (H) 11/13/2018 09:04 AM    Triglyceride 150 (H) 11/13/2018 09:04 AM    CHOL/HDL Ratio 4.9 09/13/2010 11:06 AM     Lab Results   Component Value Date/Time    TSH 1.430 03/22/2011 11:04 AM      Lab Results   Component Value Date/Time    Sodium 145 (H) 11/13/2018 09:04 AM    Potassium 3.8 11/13/2018 09:04 AM    Chloride 106 11/13/2018 09:04 AM    CO2 25 11/13/2018 09:04 AM    Anion gap 5 03/11/2015 01:35 PM    Glucose 97 11/13/2018 09:04 AM    BUN 16 11/13/2018 09:04 AM    Creatinine 0.96 11/13/2018 09:04 AM    BUN/Creatinine ratio 17 11/13/2018 09:04 AM    GFR est AA 72 11/13/2018 09:04 AM    GFR est non-AA 63 11/13/2018 09:04 AM    Calcium 9.5 11/13/2018 09:04 AM    Bilirubin, total 0.4 05/15/2018 08:45 AM    ALT (SGPT) 13 05/15/2018 08:45 AM    AST (SGOT) 11 05/15/2018 08:45 AM    Alk. phosphatase 55 05/15/2018 08:45 AM    Protein, total 5.9 (L) 05/15/2018 08:45 AM    Albumin 4.0 05/15/2018 08:45 AM    Globulin 3.5 03/11/2015 01:35 PM    A-G Ratio 2.1 05/15/2018 08:45 AM      Lab Results   Component Value Date/Time    Hemoglobin A1c 6.7 (H) 10/15/2013 12:55 PM    Hemoglobin A1c (POC) 6.0 11/13/2018 09:04 AM         Depression Risk Factor Screening:     PHQ over the last two weeks 11/7/2017   Little interest or pleasure in doing things Not at all   Feeling down, depressed or hopeless Not at all   Total Score PHQ 2 0     Alcohol Risk Factor Screening: You do not drink alcohol or very rarely.     Functional Ability and Level of Safety:   Hearing Loss  Hearing is good. Activities of Daily Living  The home contains: no safety equipment. Patient does total self care    Fall RiskFall Risk Assessment, last 12 mths 11/7/2017   Able to walk? Yes   Fall in past 12 months? No     Functional Ability:   Does the patient exhibit a steady gait? yes    How long did it take the patient to get up and walk from a sitting position? seconds    Is the patient self reliant? (ie can do own laundry, meals, household chores)  yes   Does the patient handle his/her own medications? yes   Does the patient handle his/her own money? yes   Is the patients home safe (ie good lighting, handrails on stairs and bath, etc.)? yes   Did you notice or did patient express any hearing difficulties? no   Did you notice or did patient express any vision difficulties?  no   Were distance and reading eye charts used? yes     Advance Care Planning:   Patient was offered the opportunity to discuss advance care planning:  yes    Does patient have an Advance Directive:  no   If no, did you provide information on Caring Connections? yes      Abuse Screen  Patient is not abused    Cognitive Screening   Evaluation of Cognitive Function:  Has your family/caregiver stated any concerns about your memory: no    Review of Systems   Constitutional: negative for fatigue and malaise  Eyes: negative for irritation and redness  Ears, nose, mouth, throat, and face: negative for earaches, nasal congestion and hoarseness  Respiratory: negative for cough, sputum or dyspnea on exertion  Cardiovascular: negative for chest pain, chest pressure/discomfort, palpitations, irregular heart beats. C/o increased SOB over the past  month or so. Notices that she gets more SOB with going up and down the steps at her house. Fatigue with doing her household chores. However she denies any chest pain with the SOB. No diaphoresis noted.   No lower extremity edema  Gastrointestinal: negative for dysphagia, dyspepsia, reflux symptoms, nausea, vomiting, change in bowel habits, melena, constipation and abdominal pain  Genitourinary:negative for frequency, dysuria, nocturia, urinary incontinence Integument/breast: has rahs and dryness in the groin fold with itching. C/o vaginal dryness. Hematologic/lymphatic: negative for easy bruising and lymphadenopathy  Musculoskeletal:negative for myalgias, arthralgias, stiff joints, neck pain, back pain and muscle weakness  Neurological: negative for headaches, dizziness, tremor and weakness  Endocrine: negative for diabetic symptoms including polyuria and polydipsia      Physical Examination     Evaluation of Cognitive Function:  Mood/affect:  neutral  Appearance: age appropriate  Family member/caregiver input: NA    Visit Vitals  /80 (BP 1 Location: Left arm, BP Patient Position: Sitting)   Pulse 69   Temp 97.1 °F (36.2 °C) (Oral)   Resp 16   Ht 5' 5.5\" (1.664 m)   Wt 250 lb 3.2 oz (113.5 kg)   LMP 11/19/1998   SpO2 97%   BMI 41.00 kg/m²       General:  Alert, cooperative, no distress, appears stated age. Head:  Normocephalic, without obvious abnormality, atraumatic. Ears:  Normal TMs and external ear canals both ears. Nose: Nares normal. Septum midline. Mucosa normal. No drainage or sinus tenderness. Throat: Lips, mucosa, and tongue normal. Teeth and gums normal.   Neck: Supple, symmetrical, trachea midline, no adenopathy, thyroid: no enlargement/tenderness/nodules, no carotid bruit and no JVD. Back:   Symmetric, no curvature. ROM normal. No CVA tenderness. Lungs:   Clear to auscultation bilaterally. Chest wall:  No tenderness or deformity. Heart:  Regular rate and rhythm, S1, S2 normal, no murmur, click, rub or gallop. Breast Exam:  No tenderness, masses, or nipple abnormality. Abdomen:   Soft, non-tender. Bowel sounds normal. No masses,  No organomegaly. Rectal:  Normal tone,  no masses or tenderness  Guaiac negative stool.   Has slight vaginal discharge noted on speculum exam.  Also noted rash in the groin fold. Extremities: Extremities normal, atraumatic, no cyanosis or edema. Pulses: 2+ and symmetric all extremities. Skin: Skin color, texture, turgor normal. No rashes or lesions. Lymph nodes: Cervical, supraclavicular, and axillary nodes normal.   Neurologic: CNII-XII intact. Normal strength, sensation and reflexes throughout. Patient Care Team   Patient Care Team:  Belinda Godinez MD as PCP - General    Assessment/Plan   Education and counseling provided:  Are appropriate based on today's review and evaluation  End-of-Life planning (with patient's consent)    ASSESSMENT and PLAN  Diagnoses and all orders for this visit:    1. Welcome to Medicare preventive visit  -     AMB POC URINALYSIS DIP STICK AUTO W/ MICRO  -     AMB POC EKG ROUTINE W/ 12 LEADS, INTER & REP  -     AMB POC FECAL BLOOD, OCCULT, QL 1 CARD    2. Essential hypertension  Discussed sodium restriction, high k rich diet, maintaining ideal body weight and regular exercise program such as daily walking 30 min perday 4-5 times per week, as physiologic means to achieve blood pressure control.  Medication compliance advised. 3. Mixed hyperlipidemia  -     LIPID PANEL    4. Diet-controlled diabetes mellitus (HCC)  -     MICROALBUMIN, UR, RAND W/ MICROALB/CREAT RATIO  -     AMB POC HEMOGLOBIN A1C  -     AMB POC GLUCOSE, QUANTITATIVE, BLOOD    5. Mild depression (HCC)  Stable     6. Environmental allergies  Stable     7. Encounter for medication monitoring  -     CBC W/O DIFF  -     METABOLIC PANEL, COMPREHENSIVE    8. Obesity, morbid ([address]  I have reviewed/discussed the above normal BMI with the patient. I have recommended the following interventions: dietary management education, guidance, and counseling . 9. Encounter for screening fecal occult blood testing    10.  Right shoulder pain, unspecified chronicity  -     diclofenac EC (VOLTAREN) 75 mg EC tablet; Take 1 Tab by mouth two (2) times daily as needed for Pain (shoulder pain). Take with food. -     XR SHOULDER RT AP/LAT MIN 2 V; Future    11. SOB (shortness of breath)  -     XR CHEST PA LAT; Future  -     ECHO STRESS; Future    12. Fatigue, unspecified type  -     TSH 3RD GENERATION    13. Nonspecific findings on examination of urine  -     CULTURE, URINE    14. Tinea cruris  -     ketoconazole (NIZORAL) 2 % topical cream; Apply  to affected area two (2) times daily as needed for Skin Irritation or Itching. 15. Yeast vaginitis  -     fluconazole (DIFLUCAN) 150 mg tablet; Take 1 Tab by mouth daily for 2 days. Do not take your celexa for the 2 days of taking this medication.  -     AMB POC SMEAR, STAIN & INTERPRET, WET MOUNT    16. Need for shingles vaccine  -     varicella-zoster recombinant, PF, (SHINGRIX) 50 mcg/0.5 mL susr injection; 0.5 mL by IntraMUSCular route once for 1 dose. Repeat second dose in 6 months. Follow-up and Dispositions    · Return in about 3 months (around 9/4/2019). reviewed diet, exercise and weight control  cardiovascular risk and specific lipid/LDL goals reviewed  reviewed medications and side effects in detail  specific diabetic recommendations: low cholesterol diet, weight control and daily exercise discussed and glycohemoglobin and other lab monitoring discussed    I have discussed diagnosis listed in this note with pt and/or family. I have discussed treatment plans and options and the risk/benefit analysis of those options, including safe use of medications and possible medication side effects. Through the use of shared decision making we have agreed to the above plan. The patient has received an after-visit summary and questions were answered concerning future plans and follow up. Advise pt of any urgent changes then to proceed to the ER.

## 2019-06-04 NOTE — PROGRESS NOTES
Chief Complaint   Patient presents with    Welcome To Medicare       Mammogram 12/19/2018    Colonoscopy 4/2/2015 repeat in 5 years        1. Have you been to the ER, urgent care clinic since your last visit? Hospitalized since your last visit? No    2. Have you seen or consulted any other health care providers outside of the [address] [place] [address] since your last visit? Include any pap smears or colon screening.  No

## 2019-06-04 NOTE — PATIENT INSTRUCTIONS
Shoulder Stretches: Exercises  Your Care Instructions  Here are some examples of exercises for your shoulder. Start each exercise slowly. Ease off the exercise if you start to have pain. Your doctor or physical therapist will tell you when you can start these exercises and which ones will work best for you. How to do the exercises  Shoulder stretch    1.  a doorway and place one arm against the door frame. Your elbow should be a little higher than your shoulder. 2. Relax your shoulders as you lean forward, allowing your chest and shoulder muscles to stretch. You can also turn your body slightly away from your arm to stretch the muscles even more. 3. Hold for 15 to 30 seconds. 4. Repeat 2 to 4 times with each arm. Shoulder and chest stretch    1. Shoulder and chest stretch  2. While sitting, relax your upper body so you slump slightly in your chair. 3. As you breathe in, straighten your back and open your arms out to the sides. 4. Gently pull your shoulder blades back and downward. 5. Hold for 15 to 30 seconds as your breathe normally. 6. Repeat 2 to 4 times. Overhead stretch    1. Reach up over your head with both arms. 2. Hold for 15 to 30 seconds. 3. Repeat 2 to 4 times. Follow-up care is a key part of your treatment and safety. Be sure to make and go to all appointments, and call your doctor if you are having problems. It's also a good idea to know your test results and keep a list of the medicines you take. Where can you learn more? Go to http://royal-maeve.info/. Enter S254 in the search box to learn more about \"Shoulder Stretches: Exercises. \"  Current as of: September 20, 2018  Content Version: 11.9  © 6354-7900 [employer]. Care instructions adapted under license by [employer] (which disclaims liability or warranty for this information).  If you have questions about a medical condition or this instruction, always ask your healthcare professional. [address] any warranty or liability for your use of this information. Shoulder Arthritis: Exercises  Your Care Instructions  Here are some examples of typical rehabilitation exercises for your condition. Start each exercise slowly. Ease off the exercise if you start to have pain. Your doctor or physical therapist will tell you when you can start these exercises and which ones will work best for you. How to do the exercises  Shoulder flexion (lying down)    1. Lie on your back, holding a wand with both hands. Your palms should face down as you hold the wand. 2. Keeping your elbows straight, slowly raise your arms over your head. Raise them until you feel a stretch in your shoulders, upper back, and chest.  3. Hold for 15 to 30 seconds. 4. Repeat 2 to 4 times. Shoulder rotation (lying down)    1. Lie on your back. Hold a wand with both hands with your elbows bent and palms up. 2. Keep your elbows close to your body, and move the wand across your body toward the sore arm. 3. Hold for 8 to 12 seconds. 4. Repeat 2 to 4 times. Shoulder internal rotation with towel    1. Hold a towel above and behind your head with the arm that is not sore. 2. With your sore arm, reach behind your back and grasp the towel. 3. With the arm above your head, pull the towel upward. Do this until you feel a stretch on the front and outside of your sore shoulder. 4. Hold 15 to 30 seconds. 5. Repeat 2 to 4 times. Shoulder blade squeeze    1. Stand with your arms at your sides, and squeeze your shoulder blades together. Do not raise your shoulders up as you squeeze. 2. Hold 6 seconds. 3. Repeat 8 to 12 times. Resisted rows    1. Put the band around a solid object at about waist level. (A bedpost will work well.) Each hand should hold an end of the band. 2. With your elbows at your sides and bent to 90 degrees, pull the band back.  Your shoulder blades should move toward each other. Return to the starting position. 3. Repeat 8 to 12 times. External rotator strengthening exercise    1. Start by tying a piece of elastic exercise material to a doorknob. You can use surgical tubing or Thera-Band. (You may also hold one end of the band in each hand.)  2. Stand or sit with your shoulder relaxed and your elbow bent 90 degrees. Your upper arm should rest comfortably against your side. Squeeze a rolled towel between your elbow and your body for comfort. This will help keep your arm at your side. 3. Hold one end of the elastic band with the hand of the painful arm. 4. Start with your forearm across your belly. Slowly rotate the forearm out away from your body. Keep your elbow and upper arm tucked against the towel roll or the side of your body until you begin to feel tightness in your shoulder. Slowly move your arm back to where you started. 5. Repeat 8 to 12 times. Internal rotator strengthening exercise    1. Start by tying a piece of elastic exercise material to a doorknob. You can use surgical tubing or Thera-Band. 2. Stand or sit with your shoulder relaxed and your elbow bent 90 degrees. Your upper arm should rest comfortably against your side. Squeeze a rolled towel between your elbow and your body for comfort. This will help keep your arm at your side. 3. Hold one end of the elastic band in the hand of the painful arm. 4. Slowly rotate your forearm toward your body until it touches your belly. Slowly move it back to where you started. 5. Keep your elbow and upper arm firmly tucked against the towel roll or at your side. 6. Repeat 8 to 12 times. Pendulum swing    1. Hold on to a table or the back of a chair with your good arm. Then bend forward a little and let your sore arm hang straight down. This exercise does not use the arm muscles. Rather, use your legs and your hips to create movement that makes your arm swing freely.   2. Use the movement from your hips and legs to guide the slightly swinging arm back and forth like a pendulum (or elephant trunk). Then guide it in circles that start small (about the size of a dinner plate). Make the circles a bit larger each day, as your pain allows. 3. Do this exercise for 5 minutes, 5 to 7 times each day. 4. As you have less pain, try bending over a little farther to do this exercise. This will increase the amount of movement at your shoulder. Follow-up care is a key part of your treatment and safety. Be sure to make and go to all appointments, and call your doctor if you are having problems. It's also a good idea to know your test results and keep a list of the medicines you take. Where can you learn more? Go to http://royal-maeve.info/. Enter H562 in the search box to learn more about \"Shoulder Arthritis: Exercises. \"  Current as of: September 20, 2018  Content Version: 11.9  © 4897-0294 [employer]. Care instructions adapted under license by [employer] (which disclaims liability or warranty for this information). If you have questions about a medical condition or this instruction, always ask your healthcare professional. [address] any warranty or liability for your use of this information.

## 2019-06-05 LAB
ALBUMIN SERPL-MCNC: 4.4 G/DL (ref 3.6–4.8)
ALBUMIN/CREAT UR: 3.1 MG/G CREAT (ref 0–30)
ALBUMIN/GLOB SERPL: 2 {RATIO} (ref 1.2–2.2)
ALP SERPL-CCNC: 65 IU/L (ref 39–117)
ALT SERPL-CCNC: 13 IU/L (ref 0–32)
AST SERPL-CCNC: 12 IU/L (ref 0–40)
BACTERIA UR CULT: NORMAL
BILIRUB SERPL-MCNC: 0.4 MG/DL (ref 0–1.2)
BUN SERPL-MCNC: 12 MG/DL (ref 8–27)
BUN/CREAT SERPL: 14 (ref 12–28)
CALCIUM SERPL-MCNC: 9.7 MG/DL (ref 8.7–10.3)
CHLORIDE SERPL-SCNC: 103 MMOL/L (ref 96–106)
CHOLEST SERPL-MCNC: 184 MG/DL (ref 100–199)
CO2 SERPL-SCNC: 27 MMOL/L (ref 20–29)
CREAT SERPL-MCNC: 0.87 MG/DL (ref 0.57–1)
CREAT UR-MCNC: 153.1 MG/DL
ERYTHROCYTE [DISTWIDTH] IN BLOOD BY AUTOMATED COUNT: 14.2 % (ref 12.3–15.4)
GLOBULIN SER CALC-MCNC: 2.2 G/DL (ref 1.5–4.5)
GLUCOSE SERPL-MCNC: 91 MG/DL (ref 65–99)
HCT VFR BLD AUTO: 43.4 % (ref 34–46.6)
HDLC SERPL-MCNC: 44 MG/DL
HGB BLD-MCNC: 13.8 G/DL (ref 11.1–15.9)
INTERPRETATION, 910389: NORMAL
LDLC SERPL CALC-MCNC: 114 MG/DL (ref 0–99)
Lab: NORMAL
MCH RBC QN AUTO: 28.6 PG (ref 26.6–33)
MCHC RBC AUTO-ENTMCNC: 31.8 G/DL (ref 31.5–35.7)
MCV RBC AUTO: 90 FL (ref 79–97)
MICROALBUMIN UR-MCNC: 4.7 UG/ML
PLATELET # BLD AUTO: 297 X10E3/UL (ref 150–450)
POTASSIUM SERPL-SCNC: 4.2 MMOL/L (ref 3.5–5.2)
PROT SERPL-MCNC: 6.6 G/DL (ref 6–8.5)
RBC # BLD AUTO: 4.82 X10E6/UL (ref 3.77–5.28)
SODIUM SERPL-SCNC: 142 MMOL/L (ref 134–144)
TRIGL SERPL-MCNC: 132 MG/DL (ref 0–149)
TSH SERPL DL<=0.005 MIU/L-ACNC: 2.45 UIU/ML (ref 0.45–4.5)
VLDLC SERPL CALC-MCNC: 26 MG/DL (ref 5–40)
WBC # BLD AUTO: 9 X10E3/UL (ref 3.4–10.8)

## 2019-07-12 ENCOUNTER — HOSPITAL ENCOUNTER (OUTPATIENT)
Dept: NON INVASIVE DIAGNOSTICS | Age: 64
Discharge: HOME | End: 2019-07-12
Attending: FAMILY MEDICINE
Payer: MEDICARE

## 2019-07-12 VITALS
SYSTOLIC BLOOD PRESSURE: 130 MMHG | DIASTOLIC BLOOD PRESSURE: 80 MMHG | BODY MASS INDEX: 41.65 KG/M2 | HEIGHT: 65 IN | WEIGHT: 250 LBS

## 2019-07-12 DIAGNOSIS — R06.02 SOB (SHORTNESS OF BREATH): ICD-10-CM

## 2019-07-12 LAB
STRESS ANGINA INDEX: 0
STRESS BASELINE DIAS BP: 80 MMHG
STRESS BASELINE HR: 76 BPM
STRESS BASELINE SYS BP: 130 MMHG
STRESS ESTIMATED WORKLOAD: 6.4 METS
STRESS EXERCISE DUR MIN: NORMAL
STRESS PEAK DIAS BP: 80 MMHG
STRESS PEAK SYS BP: 150 MMHG
STRESS PERCENT HR ACHIEVED: 90 %
STRESS POST PEAK HR: 139 BPM
STRESS RATE PRESSURE PRODUCT: NORMAL BPM*MMHG
STRESS SR DUKE TREADMILL SCORE: 0
STRESS ST DEPRESSION: 0 MM
STRESS ST ELEVATION: 0 MM
STRESS STAGE 1 BP: NORMAL MMHG
STRESS STAGE 1 DURATION: NORMAL MIN:SEC
STRESS STAGE 1 HR: 118 BPM
STRESS STAGE 2 DURATION: NORMAL MIN:SEC
STRESS STAGE 2 HR: 137 BPM
STRESS STAGE RECOVERY 1 BP: NORMAL MMHG
STRESS STAGE RECOVERY 1 DURATION: NORMAL MIN:SEC
STRESS STAGE RECOVERY 1 HR: 76 BPM
STRESS TARGET HR: 155 BPM

## 2019-07-12 PROCEDURE — 93351 STRESS TTE COMPLETE: CPT

## 2019-07-27 DIAGNOSIS — I10 ESSENTIAL HYPERTENSION: ICD-10-CM

## 2019-07-27 RX ORDER — FUROSEMIDE 20 MG/1
TABLET ORAL
Qty: 90 TAB | Refills: 2 | Status: SHIPPED | OUTPATIENT
Start: 2019-07-27 | End: 2021-03-18

## 2019-07-27 RX ORDER — OMEPRAZOLE 40 MG/1
CAPSULE, DELAYED RELEASE ORAL
Qty: 90 CAP | Refills: 2 | Status: SHIPPED | OUTPATIENT
Start: 2019-07-27 | End: 2020-05-27

## 2019-10-29 DIAGNOSIS — B35.6 TINEA CRURIS: ICD-10-CM

## 2019-10-29 RX ORDER — KETOCONAZOLE 20 MG/G
CREAM TOPICAL
Qty: 15 G | Refills: 1 | Status: SHIPPED | OUTPATIENT
Start: 2019-10-29 | End: 2020-05-29 | Stop reason: SDUPTHER

## 2019-11-15 DIAGNOSIS — E66.01 OBESITY, MORBID (HCC): ICD-10-CM

## 2019-11-15 RX ORDER — TOPIRAMATE 50 MG/1
TABLET, FILM COATED ORAL
Qty: 60 TAB | Refills: 10 | Status: SHIPPED | OUTPATIENT
Start: 2019-11-15 | End: 2021-01-05 | Stop reason: ALTCHOICE

## 2019-12-12 NOTE — PROGRESS NOTES
HISTORY OF PRESENT ILLNESS  Kale Garcia is a 71 y.o. female. HPI   Follow up on chronic medical problems. Overall has been feeling well. Cardiovascular Review:  The patient has hypertension and hyperlipidemia. Diet and Lifestyle: generally follows a low fat low cholesterol diet, generally follows a low sodium diet, follows a diabetic diet regularly, exercises sporadically  Home BP Monitoring: is not measured at home. Pertinent ROS: taking medications as instructed, no medication side effects noted, no TIA's, no chest pain on exertion, no dyspnea on exertion, slight swelling of ankles. DM type II follow up:  Diet controlled. Compliant w/ diabetic diet, and exercise. Has not been cheking BS recently. Denies any tingling sensation, polyuria and polydipsia. No blurred vision. No significant weight changes. Feeling well since last OV. Depression Review:  Patient is seen for followup of depression. Treatment includes Celexa. Ongoing symptoms include fatigue. She denies depressed mood, insomnia and hopelessness. She experiences the following side effects from the treatment: none.     Patient Active Problem List   Diagnosis Code    Environmental allergies Z91.09    Depression F32.9    C section     H/O: hysterectomy Z90.710    Hypovitaminosis D E55.9    Mixed hyperlipidemia E78.2    Benign essential hematuria R31.9    Diet-controlled diabetes mellitus ([facility] [address] E11.9    Hypertensive retinopathy H35.039    Encounter for medication monitoring Z51.81    At high risk for breast cancer Z91.89    Obesity, morbid ([facility] [address] E66.01    FH: colon cancer Z80.0       Current Outpatient Medications   Medication Sig Dispense Refill    topiramate (TOPAMAX) 50 mg tablet TAKE ONE TABLET BY MOUTH TWICE A DAY 60 Tab 10    ketoconazole (NIZORAL) 2 % topical cream APPLY TO AFFECTED AREA(S) TWO TIMES A DAY AS NEEDED FOR SKIN IRRITATION OR ITCHING 15 g 1    citalopram (CELEXA) 20 mg tablet TAKE ONE TABLET BY MOUTH DAILY 90 Tab 3    omeprazole (PRILOSEC) 40 mg capsule TAKE ONE CAPSULE BY MOUTH DAILY 90 Cap 2    furosemide (LASIX) 20 mg tablet TAKE ONE TABLET BY MOUTH DAILY 90 Tab 2    diclofenac EC (VOLTAREN) 75 mg EC tablet Take 1 Tab by mouth two (2) times daily as needed for Pain (shoulder pain). Take with food. 30 Tab 1    losartan-hydroCHLOROthiazide (HYZAAR) 50-12.5 mg per tablet TAKE ONE TABLET BY MOUTH DAILY 90 Tab 3    pravastatin (PRAVACHOL) 20 mg tablet TAKE ONE TABLET BY MOUTH EVERY NIGHT AT BEDTIME 90 Tab 3    fluticasone (FLONASE) 50 mcg/actuation nasal spray SPRAY TWO SPRAYS IN EACH NOSTRIL ONCE DAILY 1 Bottle 11    levocetirizine (XYZAL) 5 mg tablet TAKE ONE TABLET BY MOUTH DAILY 30 Tab 11    loratadine (CLARITIN) 10 mg tablet Take 10 mg by mouth daily as needed for Allergies.  cholecalciferol (VITAMIN D3) 1,000 unit tablet Take 1,000 Units by mouth daily.  cyanocobalamin (VITAMIN B-12) 1,000 mcg tablet Take 1,000 mcg by mouth daily.  ALPRAZolam (XANAX) 0.5 mg tablet TAKE ONE TABLET BY MOUTH AT NIGHTLY AS NEEDED 90 Tab 0    albuterol (PROAIR HFA) 90 mcg/actuation inhaler Take 2 Puffs by inhalation every four (4) hours as needed for Wheezing or Shortness of Breath. Please instruct Pt on how to use an inhaler. 1 Inhaler 3    melatonin 3 mg tablet Take 3 mg by mouth nightly as needed.  acetaminophen (TYLENOL ARTHRITIS PAIN) 650 mg CR tablet Take 1,300 mg by mouth every six (6) hours as needed for Pain.  MULTIVITAMIN PO Take 1 Tab by mouth daily.  aspirin 81 mg chewable tablet Take 81 mg by mouth daily.  CALCIUM CARB/VIT D3/MINERALS (CALTRATE PLUS PO) Take 1 Tab by mouth every seven (7) days.          Allergies   Allergen Reactions    Ciprofloxacin Other (comments)     Insomnia and hot flashes    Demerol [Meperidine] Other (comments)     hallucinations    Doxycycline Unknown (comments)       \"Feeling flushed\"    Floxin [Ofloxacin] Other (comments)     insomnia  Lisinopril Cough    Phenergan-Codeine [Promethazine-Codeine] Itching     Patient stated she started itching when she was tapering off of medication.  Prednisone Other (comments)     Has feelings of depression, \"weird thoughts\"         Past Medical History:   Diagnosis Date    C section 3/12/2010    Depression 3/12/2010    Diabetes ([address]     Environmental allergies 3/12/2010    H/O: hysterectomy 3/12/2010    High cholesterol 3/12/2010    HTN (hypertension) 3/12/2010    Pneumonia 2017         Past Surgical History:   Procedure Laterality Date    ENDOSCOPY, COLON, DIAGNOSTIC  2006    repeat 10 yrs (Dr. 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-     HX CYSTOSTOMY  3/2015    HX HYSTERECTOMY  1998         Family History   Problem Relation Age of Onset    Hypertension Mother     Other Mother         obesity, chf    Heart Disease Mother         CHF    Kidney Disease Mother     High Cholesterol Mother     Cancer Father         lung    Breast Cancer Sister     MS Sister     High Cholesterol Sister     Hypertension Sister     Hypertension Brother     Liver Disease Brother         Hep C    High Cholesterol Brother     Cancer Brother         colon    Hypertension Sister     Diabetes Sister     High Cholesterol Sister     Hypertension Sister     Other Sister         obese    High Cholesterol Sister        Social History     Tobacco Use    Smoking status: Never Smoker    Smokeless tobacco: Never Used   Substance Use Topics    Alcohol use:  Yes     Alcohol/week: 0.0 standard drinks     Comment: occasional        Lab Results   Component Value Date/Time    WBC 9.0 2019 01:29 PM    HGB 13.8 2019 01:29 PM    Hemoglobin (POC) 15.0 2016 05:26 PM    HCT 43.4 2019 01:29 PM    Hematocrit (POC) 44 2016 05:26 PM PLATELET 498 44/72/8917 01:29 PM    MCV 90 06/04/2019 01:29 PM     Lab Results   Component Value Date/Time    Cholesterol, total 184 06/04/2019 01:29 PM    HDL Cholesterol 44 06/04/2019 01:29 PM    LDL, calculated 114 (H) 06/04/2019 01:29 PM    Triglyceride 132 06/04/2019 01:29 PM    CHOL/HDL Ratio 4.9 09/13/2010 11:06 AM     Lab Results   Component Value Date/Time    TSH 2.450 06/04/2019 01:29 PM      Lab Results   Component Value Date/Time    Sodium 142 06/04/2019 01:29 PM    Potassium 4.2 06/04/2019 01:29 PM    Chloride 103 06/04/2019 01:29 PM    CO2 27 06/04/2019 01:29 PM    Anion gap 5 03/11/2015 01:35 PM    Glucose 91 06/04/2019 01:29 PM    BUN 12 06/04/2019 01:29 PM    Creatinine 0.87 06/04/2019 01:29 PM    BUN/Creatinine ratio 14 06/04/2019 01:29 PM    GFR est AA 81 06/04/2019 01:29 PM    GFR est non-AA 70 06/04/2019 01:29 PM    Calcium 9.7 06/04/2019 01:29 PM    Bilirubin, total 0.4 06/04/2019 01:29 PM    ALT (SGPT) 13 06/04/2019 01:29 PM    AST (SGOT) 12 06/04/2019 01:29 PM    Alk. phosphatase 65 06/04/2019 01:29 PM    Protein, total 6.6 06/04/2019 01:29 PM    Albumin 4.4 06/04/2019 01:29 PM    Globulin 3.5 03/11/2015 01:35 PM    A-G Ratio 2.0 06/04/2019 01:29 PM      Lab Results   Component Value Date/Time    Hemoglobin A1c 6.7 (H) 10/15/2013 12:55 PM    Hemoglobin A1c (POC) 6.0 06/04/2019 01:29 PM         Review of Systems   Constitutional: Negative for malaise/fatigue. HENT: Positive for congestion. Negative for sore throat. Has had some increased nasal congestion and cough over the past 2 to 3 days. Eyes: Negative for blurred vision. Respiratory: Positive for cough. Negative for sputum production and shortness of breath. Cardiovascular: Negative for chest pain, palpitations and leg swelling. Gastrointestinal: Negative for abdominal pain, constipation and heartburn. Genitourinary: Negative for dysuria, frequency and urgency.         Has rash and itching in from the vagina and around the labia over the past several weeks that comes and goes. Musculoskeletal: Negative for back pain and joint pain. Neurological: Negative for dizziness, tingling and headaches. Endo/Heme/Allergies: Positive for environmental allergies. Psychiatric/Behavioral: Negative for depression. The patient does not have insomnia. Physical Exam   Constitutional: She appears well-developed and well-nourished. /60 (BP 1 Location: Left arm, BP Patient Position: Sitting)   Pulse 60   Temp 98.8 °F (37.1 °C) (Oral)   Resp 18   Ht 5' 5\" (1.651 m)   Wt 252 lb (114.3 kg)   LMP 11/19/1998   SpO2 98%   BMI 41.93 kg/m²        HENT:   Right Ear: Tympanic membrane and ear canal normal.   Left Ear: Tympanic membrane and ear canal normal.   Nose: Rhinorrhea present. No mucosal edema. Mouth/Throat: Mucous membranes are normal. Posterior oropharyngeal edema present. Neck: Normal range of motion. Neck supple. No thyromegaly present. Cardiovascular: Normal rate, regular rhythm and normal heart sounds. Exam reveals no gallop. Pulmonary/Chest: Effort normal and breath sounds normal.   Abdominal: Soft. Normal appearance and bowel sounds are normal. She exhibits no mass. There is no tenderness. Musculoskeletal: Normal range of motion. General: Edema (mild) present. No tenderness. Lymphadenopathy:     She has no cervical adenopathy. Skin: Skin is warm and dry. Psychiatric: She has a normal mood and affect. Nursing note and vitals reviewed. ASSESSMENT and PLAN  Diagnoses and all orders for this visit:    1. Essential hypertension  Discussed sodium restriction, high k rich diet, maintaining ideal body weight and regular exercise program such as daily walking 30 min perday 4-5 times per week, as physiologic means to achieve blood pressure control.  Medication compliance advised.      2. Mixed hyperlipidemia  -     LIPID PANEL    3. Diet-controlled diabetes mellitus (HCC)  -     AMB POC HEMOGLOBIN A1C  -     AMB POC GLUCOSE, QUANTITATIVE, BLOOD    4. Mild depression (HCC)  Stable     5. Environmental allergies//  6. Upper respiratory tract infection, unspecified type  Add claritin as needed. 7. Encounter for medication monitoring  -     METABOLIC PANEL, COMPREHENSIVE  -     AMB POC URINALYSIS DIP STICK AUTO W/ MICRO    8. Encounter for screening mammogram for breast cancer  -     [facility]D [address] [facility] MAMMO BI SCREENING INCL CAD; Future    9. At high risk for breast cancer  -     REFERRAL TO BREAST SURGERY  Needs follow up with surgery. 10. Encounter for screening for osteoporosis  -     DEXA BONE DENSITY STUDY AXIAL; Future    11. Postmenopause  -     DEXA BONE DENSITY STUDY AXIAL; Future    12. Colon cancer screening  13. Family hx of colon cancer  -     REFERRAL TO GASTROENTEROLOGY    14. Need for shingles vaccine  -     varicella-zoster recombinant, PF, (SHINGRIX) 50 mcg/0.5 mL susr injection; 0.5 mL by IntraMUSCular route once for 1 dose. Repeat second dose in 6 months. 15. Vulvovaginitis due to yeast  -     fluconazole (DIFLUCAN) 100 mg tablet; Take 1 Tab by mouth daily for 5 days. Do not take the celexa while taking this medication. Follow-up and Dispositions    · Return in about 6 months (around 6/13/2020) for medicare wellness exam.       reviewed diet, exercise and weight control  cardiovascular risk and specific lipid/LDL goals reviewed  reviewed medications and side effects in detail  specific diabetic recommendations: low cholesterol diet, weight control and daily exercise discussed and glycohemoglobin and other lab monitoring discussed      I have discussed diagnosis listed in this note with pt and/or family. I have discussed treatment plans and options and the risk/benefit analysis of those options, including safe use of medications and possible medication side effects. Through the use of shared decision making we have agreed to the above plan.  The patient has received an after-visit summary and questions were answered concerning future plans and follow up. Advise pt of any urgent changes then to proceed to the ER.

## 2019-12-13 ENCOUNTER — OFFICE VISIT (OUTPATIENT)
Dept: FAMILY MEDICINE CLINIC | Age: 64
End: 2019-12-13

## 2019-12-13 ENCOUNTER — HOSPITAL ENCOUNTER (OUTPATIENT)
Dept: LAB | Age: 64
Discharge: HOME | End: 2019-12-13
Payer: MEDICARE

## 2019-12-13 VITALS
BODY MASS INDEX: 41.99 KG/M2 | DIASTOLIC BLOOD PRESSURE: 60 MMHG | OXYGEN SATURATION: 98 % | SYSTOLIC BLOOD PRESSURE: 124 MMHG | RESPIRATION RATE: 18 BRPM | HEART RATE: 60 BPM | HEIGHT: 65 IN | WEIGHT: 252 LBS | TEMPERATURE: 98.8 F

## 2019-12-13 DIAGNOSIS — B37.31 VULVOVAGINITIS DUE TO YEAST: ICD-10-CM

## 2019-12-13 DIAGNOSIS — Z78.0 POSTMENOPAUSE: ICD-10-CM

## 2019-12-13 DIAGNOSIS — F32.A MILD DEPRESSION: ICD-10-CM

## 2019-12-13 DIAGNOSIS — Z12.11 COLON CANCER SCREENING: ICD-10-CM

## 2019-12-13 DIAGNOSIS — E78.2 MIXED HYPERLIPIDEMIA: ICD-10-CM

## 2019-12-13 DIAGNOSIS — Z91.89 AT HIGH RISK FOR BREAST CANCER: ICD-10-CM

## 2019-12-13 DIAGNOSIS — J06.9 UPPER RESPIRATORY TRACT INFECTION, UNSPECIFIED TYPE: ICD-10-CM

## 2019-12-13 DIAGNOSIS — Z51.81 ENCOUNTER FOR MEDICATION MONITORING: ICD-10-CM

## 2019-12-13 DIAGNOSIS — Z80.0 FAMILY HX OF COLON CANCER: ICD-10-CM

## 2019-12-13 DIAGNOSIS — Z91.09 ENVIRONMENTAL ALLERGIES: ICD-10-CM

## 2019-12-13 DIAGNOSIS — Z23 NEED FOR SHINGLES VACCINE: ICD-10-CM

## 2019-12-13 DIAGNOSIS — Z12.31 ENCOUNTER FOR SCREENING MAMMOGRAM FOR BREAST CANCER: ICD-10-CM

## 2019-12-13 DIAGNOSIS — I10 ESSENTIAL HYPERTENSION: Primary | ICD-10-CM

## 2019-12-13 DIAGNOSIS — Z13.820 ENCOUNTER FOR SCREENING FOR OSTEOPOROSIS: ICD-10-CM

## 2019-12-13 DIAGNOSIS — E11.9 DIET-CONTROLLED DIABETES MELLITUS (HCC): ICD-10-CM

## 2019-12-13 LAB
BILIRUB UR QL STRIP: NEGATIVE
GLUCOSE POC: 100 MG/DL
GLUCOSE UR-MCNC: NEGATIVE MG/DL
HBA1C MFR BLD HPLC: 5.9 %
KETONES P FAST UR STRIP-MCNC: NEGATIVE MG/DL
PH UR STRIP: 6 [PH] (ref 4.6–8)
PROT UR QL STRIP: NEGATIVE
SP GR UR STRIP: 1.02 (ref 1–1.03)
UA UROBILINOGEN AMB POC: NORMAL (ref 0.2–1)
URINALYSIS CLARITY POC: CLEAR
URINALYSIS COLOR POC: YELLOW
URINE BLOOD POC: NORMAL
URINE LEUKOCYTES POC: NORMAL
URINE NITRITES POC: NEGATIVE

## 2019-12-13 PROCEDURE — 80061 LIPID PANEL: CPT

## 2019-12-13 PROCEDURE — 36415 COLL VENOUS BLD VENIPUNCTURE: CPT

## 2019-12-13 PROCEDURE — 80053 COMPREHEN METABOLIC PANEL: CPT

## 2019-12-13 RX ORDER — LORATADINE 10 MG/1
10 TABLET ORAL DAILY
COMMUNITY
End: 2022-09-02 | Stop reason: ALTCHOICE

## 2019-12-13 RX ORDER — LEVOCETIRIZINE DIHYDROCHLORIDE 5 MG/1
TABLET, FILM COATED ORAL
Qty: 30 TAB | Refills: 10 | Status: SHIPPED | OUTPATIENT
Start: 2019-12-13 | End: 2020-12-04

## 2019-12-13 RX ORDER — FLUCONAZOLE 100 MG/1
100 TABLET ORAL DAILY
Qty: 5 TAB | Refills: 0 | Status: SHIPPED | OUTPATIENT
Start: 2019-12-13 | End: 2019-12-18

## 2019-12-13 NOTE — PROGRESS NOTES
Chief Complaint   Patient presents with    Cholesterol Problem     follow up    Hypertension     follow up    Diabetes     follow up    Cough     x 2-3 days       A1C 6/4/2019    Mammogram 1/2/2019    Colonoscopy 4/2/2015 by Dr. Evin Delgado repeat in 5 years. 1. Have you been to the ER, urgent care clinic since your last visit? Hospitalized since your last visit? No    2. Have you seen or consulted any other health care providers outside of the [address] [place] [address] since your last visit? Include any pap smears or colon screening.  No

## 2019-12-14 LAB
ALBUMIN SERPL-MCNC: 4.4 G/DL (ref 3.6–4.8)
ALBUMIN/GLOB SERPL: 1.8 {RATIO} (ref 1.2–2.2)
ALP SERPL-CCNC: 71 IU/L (ref 39–117)
ALT SERPL-CCNC: 12 IU/L (ref 0–32)
AST SERPL-CCNC: 15 IU/L (ref 0–40)
BILIRUB SERPL-MCNC: 0.3 MG/DL (ref 0–1.2)
BUN SERPL-MCNC: 17 MG/DL (ref 8–27)
BUN/CREAT SERPL: 18 (ref 12–28)
CALCIUM SERPL-MCNC: 9.4 MG/DL (ref 8.7–10.3)
CHLORIDE SERPL-SCNC: 104 MMOL/L (ref 96–106)
CHOLEST SERPL-MCNC: 208 MG/DL (ref 100–199)
CO2 SERPL-SCNC: 21 MMOL/L (ref 20–29)
CREAT SERPL-MCNC: 0.97 MG/DL (ref 0.57–1)
GLOBULIN SER CALC-MCNC: 2.5 G/DL (ref 1.5–4.5)
GLUCOSE SERPL-MCNC: 100 MG/DL (ref 65–99)
HDLC SERPL-MCNC: 42 MG/DL
INTERPRETATION, 910389: NORMAL
LDLC SERPL CALC-MCNC: 134 MG/DL (ref 0–99)
POTASSIUM SERPL-SCNC: 3.8 MMOL/L (ref 3.5–5.2)
PROT SERPL-MCNC: 6.9 G/DL (ref 6–8.5)
SODIUM SERPL-SCNC: 141 MMOL/L (ref 134–144)
TRIGL SERPL-MCNC: 162 MG/DL (ref 0–149)
VLDLC SERPL CALC-MCNC: 32 MG/DL (ref 5–40)

## 2019-12-15 DIAGNOSIS — Z91.09 ENVIRONMENTAL ALLERGIES: ICD-10-CM

## 2019-12-16 RX ORDER — FLUTICASONE PROPIONATE 50 MCG
SPRAY, SUSPENSION (ML) NASAL
Qty: 3 BOTTLE | Refills: 3 | Status: SHIPPED | OUTPATIENT
Start: 2019-12-16 | End: 2020-05-29 | Stop reason: SDUPTHER

## 2019-12-16 RX ORDER — PRAVASTATIN SODIUM 20 MG/1
TABLET ORAL
Qty: 180 TAB | Refills: 3 | Status: SHIPPED | OUTPATIENT
Start: 2019-12-16 | End: 2020-04-02

## 2020-01-06 ENCOUNTER — CLINICAL SUPPORT (OUTPATIENT)
Dept: FAMILY MEDICINE CLINIC | Age: 65
End: 2020-01-06

## 2020-01-06 VITALS
RESPIRATION RATE: 16 BRPM | SYSTOLIC BLOOD PRESSURE: 130 MMHG | DIASTOLIC BLOOD PRESSURE: 84 MMHG | HEIGHT: 65 IN | TEMPERATURE: 97.8 F | HEART RATE: 76 BPM | OXYGEN SATURATION: 95 % | BODY MASS INDEX: 42.32 KG/M2 | WEIGHT: 254 LBS

## 2020-01-06 DIAGNOSIS — Z23 ENCOUNTER FOR IMMUNIZATION: Primary | ICD-10-CM

## 2020-01-06 NOTE — PROGRESS NOTES
Chief Complaint   Patient presents with    Injection     Order placed for flu shot, per Verbal Order from Dr. Archie Fish on 1/6/2020 due to need    Flu shot 0.5 ml given in left arm IM, no reaction noted. Mack Reese

## 2020-01-08 ENCOUNTER — HOSPITAL ENCOUNTER (OUTPATIENT)
Dept: MAMMOGRAPHY | Age: 65
Discharge: HOME | End: 2020-01-08
Attending: FAMILY MEDICINE
Payer: MEDICARE

## 2020-01-08 DIAGNOSIS — Z12.31 ENCOUNTER FOR SCREENING MAMMOGRAM FOR BREAST CANCER: ICD-10-CM

## 2020-01-08 PROCEDURE — 77063 BREAST TOMOSYNTHESIS BI: CPT

## 2020-01-16 ENCOUNTER — HOSPITAL ENCOUNTER (OUTPATIENT)
Dept: BONE DENSITY | Age: 65
Discharge: HOME | End: 2020-01-16
Attending: FAMILY MEDICINE
Payer: MEDICARE

## 2020-01-16 DIAGNOSIS — Z78.0 POSTMENOPAUSE: ICD-10-CM

## 2020-01-16 DIAGNOSIS — Z13.820 ENCOUNTER FOR SCREENING FOR OSTEOPOROSIS: ICD-10-CM

## 2020-01-16 PROCEDURE — 77080 DXA BONE DENSITY AXIAL: CPT

## 2020-04-02 RX ORDER — PRAVASTATIN SODIUM 20 MG/1
TABLET ORAL
Qty: 90 TAB | Refills: 2 | Status: SHIPPED | OUTPATIENT
Start: 2020-04-02 | End: 2020-04-03 | Stop reason: SDUPTHER

## 2020-04-03 ENCOUNTER — PATIENT MESSAGE (OUTPATIENT)
Dept: OTHER | Age: 65
End: 2020-04-03

## 2020-04-03 DIAGNOSIS — E78.2 MIXED HYPERLIPIDEMIA: Primary | ICD-10-CM

## 2020-04-03 RX ORDER — PRAVASTATIN SODIUM 20 MG/1
40 TABLET ORAL
Qty: 180 TAB | Refills: 3 | OUTPATIENT
Start: 2020-04-03

## 2020-04-03 RX ORDER — PRAVASTATIN SODIUM 20 MG/1
40 TABLET ORAL
Qty: 180 TAB | Refills: 3 | Status: SHIPPED | OUTPATIENT
Start: 2020-04-03 | End: 2021-04-04

## 2020-05-26 RX ORDER — LOSARTAN POTASSIUM AND HYDROCHLOROTHIAZIDE 12.5; 5 MG/1; MG/1
TABLET ORAL
Qty: 90 TAB | Refills: 2 | Status: SHIPPED | OUTPATIENT
Start: 2020-05-26 | End: 2021-03-05

## 2020-05-29 ENCOUNTER — VIRTUAL VISIT (OUTPATIENT)
Dept: FAMILY MEDICINE CLINIC | Age: 65
End: 2020-05-29

## 2020-05-29 DIAGNOSIS — G56.01 CARPAL TUNNEL SYNDROME OF RIGHT WRIST: Primary | ICD-10-CM

## 2020-05-29 DIAGNOSIS — Z91.09 ENVIRONMENTAL ALLERGIES: ICD-10-CM

## 2020-05-29 DIAGNOSIS — B35.6 TINEA CRURIS: ICD-10-CM

## 2020-05-29 RX ORDER — GABAPENTIN 300 MG/1
300 CAPSULE ORAL 2 TIMES DAILY
Qty: 60 CAP | Refills: 3 | Status: SHIPPED | OUTPATIENT
Start: 2020-05-29 | End: 2020-06-17 | Stop reason: ALTCHOICE

## 2020-05-29 RX ORDER — KETOCONAZOLE 20 MG/G
CREAM TOPICAL
Qty: 15 G | Refills: 1 | Status: SHIPPED | OUTPATIENT
Start: 2020-05-29 | End: 2021-07-13 | Stop reason: ALTCHOICE

## 2020-05-29 RX ORDER — FLUTICASONE PROPIONATE 50 MCG
2 SPRAY, SUSPENSION (ML) NASAL
Qty: 3 BOTTLE | Refills: 3 | Status: SHIPPED | OUTPATIENT
Start: 2020-05-29 | End: 2021-06-15

## 2020-05-29 NOTE — PROGRESS NOTES
Chief Complaint   Patient presents with    Tingling     patient c/o tingling in right shoulder and radiates to right hand for  about 2 weeks      Patient states she is working at a part time job and on computer and not sure if that is causing the tingling in her right shoulder and radiating down to right hand. Patient thinks she had been on Gabapentin quite a whole ago. A1C 12/13/2019    Mammogram 1/8/2020    Colonoscopy 4/2/2015 by Dr. Jose Bradley repeat in 5 years. Patient had a colonoscopy scheduled for 4/3/2020 but patient cancelled due to the COVID 19. Patient states she had an eye exam 2/2020 with Dr. Amisha Oakes. 1. Have you been to the ER, urgent care clinic since your last visit? Hospitalized since your last visit? No    2. Have you seen or consulted any other health care providers outside of the [address] [place] [address] since your last visit? Include any pap smears or colon screening.  No

## 2020-05-29 NOTE — PROGRESS NOTES
HISTORY OF PRESENT ILLNESS  Vargas Luevano is a 76 y.o. female. HPI   Patient encounter by synchronous (real-time) audio-video technology which is patient-initiated. Patient is aware that this encounter is a billable service with coverage as determined by her insurance carrier, all discussed at the time of check-in. Patient has given verbal consent to proceed. C/o increase pain and tinging in the right hand and radiating up the arm. She thinks the pain has been triggered by her working on the computer more since working from home. She has the tingling and numbness in the hand and arm at night that keeps her awake. She has had abnormal EMG test a few years ago and was told d/t CTS. She was on gabapentin for a while and then sx got better so she has not taken it for a few years. She wants to restart back on the gabapentin. She has not been wearing a splint. Has not done any CTS exercises. Also we discussed adding a b-complex vitamin and taking frequent breaks from her computer work. She did not ever get a steroid injection for her CTS. Patient Active Problem List   Diagnosis Code    Environmental allergies Z91.09    Depression F32.9    C section     H/O: hysterectomy Z90.710    Hypovitaminosis D E55.9    Mixed hyperlipidemia E78.2    Benign essential hematuria R31.9    Diet-controlled diabetes mellitus ([address] E11.9    Hypertensive retinopathy H35.039    Encounter for medication monitoring Z51.81    At high risk for breast cancer Z91.89    Obesity, morbid ([address] E66.01    FH: colon cancer Z80.0       Current Outpatient Medications   Medication Sig Dispense Refill    fluticasone propionate (FLONASE) 50 mcg/actuation nasal spray 2 Sprays by Both Nostrils route daily as needed for Allergies.  3 Bottle 3    ketoconazole (NIZORAL) 2 % topical cream APPLY TO AFFECTED AREA(S) TWO TIMES A DAY AS NEEDED FOR SKIN IRRITATION OR ITCHING 15 g 1    gabapentin (NEURONTIN) 300 mg capsule Take 1 Cap by mouth two (2) times a day. Max Daily Amount: 600 mg. 60 Cap 3    omeprazole (PRILOSEC) 40 mg capsule TAKE ONE CAPSULE BY MOUTH DAILY 90 Cap 3    losartan-hydroCHLOROthiazide (HYZAAR) 50-12.5 mg per tablet TAKE ONE TABLET BY MOUTH DAILY 90 Tab 2    pravastatin (PRAVACHOL) 20 mg tablet Take 2 Tabs by mouth nightly. 180 Tab 3    levocetirizine (XYZAL) 5 mg tablet TAKE ONE TABLET BY MOUTH DAILY 30 Tab 10    loratadine (CLARITIN) 10 mg tablet Take 10 mg by mouth daily.  topiramate (TOPAMAX) 50 mg tablet TAKE ONE TABLET BY MOUTH TWICE A DAY 60 Tab 10    citalopram (CELEXA) 20 mg tablet TAKE ONE TABLET BY MOUTH DAILY 90 Tab 3    furosemide (LASIX) 20 mg tablet TAKE ONE TABLET BY MOUTH DAILY 90 Tab 2    cholecalciferol (VITAMIN D3) 1,000 unit tablet Take 1,000 Units by mouth every seven (7) days.  cyanocobalamin (VITAMIN B-12) 1,000 mcg tablet Take 1,000 mcg by mouth daily.  ALPRAZolam (XANAX) 0.5 mg tablet TAKE ONE TABLET BY MOUTH AT NIGHTLY AS NEEDED 90 Tab 0    albuterol (PROAIR HFA) 90 mcg/actuation inhaler Take 2 Puffs by inhalation every four (4) hours as needed for Wheezing or Shortness of Breath. Please instruct Pt on how to use an inhaler. 1 Inhaler 3    melatonin 3 mg tablet Take 3 mg by mouth nightly as needed.  acetaminophen (TYLENOL ARTHRITIS PAIN) 650 mg CR tablet Take 1,300 mg by mouth every six (6) hours as needed for Pain.  MULTIVITAMIN PO Take 1 Tab by mouth daily.  aspirin 81 mg chewable tablet Take 81 mg by mouth daily.  CALCIUM CARB/VIT D3/MINERALS (CALTRATE PLUS PO) Take 1 Tab by mouth every seven (7) days.  diclofenac EC (VOLTAREN) 75 mg EC tablet Take 1 Tab by mouth two (2) times daily as needed for Pain (shoulder pain). Take with food.  30 Tab 1       Allergies   Allergen Reactions    Ciprofloxacin Other (comments)     Insomnia and hot flashes    Demerol [Meperidine] Other (comments)     hallucinations    Doxycycline Unknown (comments) \"Feeling flushed\"    Floxin [Ofloxacin] Other (comments)     insomnia    Lisinopril Cough    Phenergan-Codeine [Promethazine-Codeine] Itching     Patient stated she started itching when she was tapering off of medication.  Prednisone Other (comments)     Has feelings of depression, \"weird thoughts\"       Past Medical History:   Diagnosis Date    C section 3/12/2010    Depression 3/12/2010    Diabetes ([address]     Environmental allergies 3/12/2010    H/O: hysterectomy 3/12/2010    High cholesterol 3/12/2010    HTN (hypertension) 3/12/2010    Pneumonia 2017       Past Surgical History:   Procedure Laterality Date    ENDOSCOPY, COLON, DIAGNOSTIC      repeat 10 yrs (Dr. Britany Saenz)    HX BREAST BIOPSY  1980    both breast    HX BREAST BIOPSY  1986    left breast    HX  SECTION      x 3    HX CORNEAL TRANSPLANT  2011    left eye-     HX CYSTOSTOMY  3/2015    HX HYSTERECTOMY  1998       Family History   Problem Relation Age of Onset    Hypertension Mother     Other Mother         obesity, chf    Heart Disease Mother         CHF    Kidney Disease Mother     High Cholesterol Mother     Cancer Father         lung    Breast Cancer Sister     MS Sister     High Cholesterol Sister     Hypertension Sister     Cancer Sister         breast    Hypertension Brother     Liver Disease Brother         Hep C    High Cholesterol Brother     Cancer Brother         colon    Hypertension Sister     Diabetes Sister     High Cholesterol Sister     Hypertension Sister     Other Sister         obese    High Cholesterol Sister     Cancer Niece         cervical       Social History     Tobacco Use    Smoking status: Never Smoker    Smokeless tobacco: Never Used   Substance Use Topics    Alcohol use:  Yes     Alcohol/week: 0.0 standard drinks     Comment: occasional        Lab Results   Component Value Date/Time    WBC 9.0 2019 01:29 PM    HGB 13.8 2019 01:29 PM    Hemoglobin (POC) 15.0 09/29/2016 05:26 PM    HCT 43.4 06/04/2019 01:29 PM    Hematocrit (POC) 44 09/29/2016 05:26 PM    PLATELET 344 51/33/0827 01:29 PM    MCV 90 06/04/2019 01:29 PM     Lab Results   Component Value Date/Time    Cholesterol, total 208 (H) 12/13/2019 08:19 AM    HDL Cholesterol 42 12/13/2019 08:19 AM    LDL, calculated 134 (H) 12/13/2019 08:19 AM    Triglyceride 162 (H) 12/13/2019 08:19 AM    CHOL/HDL Ratio 4.9 09/13/2010 11:06 AM     Lab Results   Component Value Date/Time    TSH 2.450 06/04/2019 01:29 PM      Lab Results   Component Value Date/Time    Sodium 141 12/13/2019 08:19 AM    Potassium 3.8 12/13/2019 08:19 AM    Chloride 104 12/13/2019 08:19 AM    CO2 21 12/13/2019 08:19 AM    Anion gap 5 03/11/2015 01:35 PM    Glucose 100 (H) 12/13/2019 08:19 AM    BUN 17 12/13/2019 08:19 AM    Creatinine 0.97 12/13/2019 08:19 AM    BUN/Creatinine ratio 18 12/13/2019 08:19 AM    GFR est AA 71 12/13/2019 08:19 AM    GFR est non-AA 61 12/13/2019 08:19 AM    Calcium 9.4 12/13/2019 08:19 AM    Bilirubin, total 0.3 12/13/2019 08:19 AM    ALT (SGPT) 12 12/13/2019 08:19 AM    Alk. phosphatase 71 12/13/2019 08:19 AM    Protein, total 6.9 12/13/2019 08:19 AM    Albumin 4.4 12/13/2019 08:19 AM    Globulin 3.5 03/11/2015 01:35 PM    A-G Ratio 1.8 12/13/2019 08:19 AM      Lab Results   Component Value Date/Time    Hemoglobin A1c 6.7 (H) 10/15/2013 12:55 PM    Hemoglobin A1c (POC) 5.9 12/13/2019 08:19 AM         Review of Systems   Musculoskeletal: Negative for neck pain. Neurological: Negative for focal weakness. Physical Exam  Constitutional:       Appearance: Normal appearance. Pulmonary:      Effort: Pulmonary effort is normal.   Neurological:      Mental Status: She is alert. Psychiatric:         Mood and Affect: Mood normal.         Thought Content: Thought content normal.         ASSESSMENT and PLAN  Diagnoses and all orders for this visit:    1.  Carpal tunnel syndrome of right wrist  - gabapentin (NEURONTIN) 300 mg capsule; Take 1 Cap by mouth two (2) times a day. Max Daily Amount: 600 mg. Wrist splint. Hand exercises. Follow up in 3 weeks. If not improving referr for dteroid injection. 2. Environmental allergies  -    Refill fluticasone propionate (FLONASE) 50 mcg/actuation nasal spray; 2 Sprays by Both Nostrils route daily as needed for Allergies.     3. Tinea cruris  -    Refill ketoconazole (NIZORAL) 2 % topical cream; APPLY TO AFFECTED AREA(S) TWO TIMES A DAY AS NEEDED FOR SKIN IRRITATION OR ITCHING          reviewed medications and side effects in detail

## 2020-06-10 ENCOUNTER — TELEPHONE (OUTPATIENT)
Dept: FAMILY MEDICINE CLINIC | Age: 65
End: 2020-06-10

## 2020-06-10 NOTE — TELEPHONE ENCOUNTER
----- Message from Jackelny Og sent at 6/10/2020 12:34 PM EDT -----  Regarding: [facility]/Telephone  General Message/Vendor Calls    Caller's first and last name:      Reason for call:  Appointment for June 17th    Callback required yes/no and why:  Yes, would like to know if she can still go into the office because she cannot wait for the next available CPE.     Best contact number(s):  [phone]    Details to clarify the request:      Jackelyn Og

## 2020-06-17 ENCOUNTER — OFFICE VISIT (OUTPATIENT)
Dept: FAMILY MEDICINE CLINIC | Age: 65
End: 2020-06-17

## 2020-06-17 VITALS
TEMPERATURE: 96.8 F | BODY MASS INDEX: 41.92 KG/M2 | RESPIRATION RATE: 18 BRPM | OXYGEN SATURATION: 97 % | SYSTOLIC BLOOD PRESSURE: 120 MMHG | WEIGHT: 251.6 LBS | DIASTOLIC BLOOD PRESSURE: 72 MMHG | HEART RATE: 66 BPM | HEIGHT: 65 IN

## 2020-06-17 DIAGNOSIS — Z00.00 MEDICARE ANNUAL WELLNESS VISIT, SUBSEQUENT: Primary | ICD-10-CM

## 2020-06-17 DIAGNOSIS — K92.1 BLOOD IN STOOL: ICD-10-CM

## 2020-06-17 DIAGNOSIS — Z91.09 ENVIRONMENTAL ALLERGIES: ICD-10-CM

## 2020-06-17 DIAGNOSIS — I10 ESSENTIAL HYPERTENSION: ICD-10-CM

## 2020-06-17 DIAGNOSIS — E11.9 DIET-CONTROLLED DIABETES MELLITUS (HCC): ICD-10-CM

## 2020-06-17 DIAGNOSIS — E78.2 MIXED HYPERLIPIDEMIA: ICD-10-CM

## 2020-06-17 DIAGNOSIS — F32.A MILD DEPRESSION: ICD-10-CM

## 2020-06-17 DIAGNOSIS — Z51.81 ENCOUNTER FOR MEDICATION MONITORING: ICD-10-CM

## 2020-06-17 PROBLEM — E11.40 TYPE 2 DIABETES MELLITUS WITH DIABETIC NEUROPATHY (HCC): Status: ACTIVE | Noted: 2020-06-17

## 2020-06-17 RX ORDER — GABAPENTIN 300 MG/1
300 CAPSULE ORAL
COMMUNITY
End: 2020-10-07

## 2020-06-17 RX ORDER — DICLOFENAC SODIUM 10 MG/G
GEL TOPICAL
COMMUNITY
End: 2022-09-02 | Stop reason: ALTCHOICE

## 2020-06-17 NOTE — PROGRESS NOTES
HISTORY OF PRESENT ILLNESS  Nikki Ba is a 76 y.o. female. HPI   Follow up on chronic medical problems. Overall has been feeling well. Doing the precautionary measures at home to reduce risks of exposure COVID19. She has been more anxious related to her family(her son and his wife and 2 kids) that lives with her being in and out out the home. Pt is wearing mask when she is going out. No known sick contacts or known exposure to [address]. Cardiovascular Review:  The patient has hypertension and hyperlipidemia. Diet and Lifestyle: generally follows a low fat low cholesterol diet, generally follows a low sodium diet, follows a diabetic diet regularly, exercises sporadically  Home BP Monitoring: is not measured at home. Pertinent ROS: taking medications as instructed, no medication side effects noted, no TIA's, no chest pain on exertion, no dyspnea on exertion, slight swelling of ankles. DM type II follow up:  Diet controlled. Compliant w/ diabetic diet, and exercise. Has not been cheking BS recently. Denies any tingling sensation, polyuria and polydipsia. No blurred vision. No significant weight changes. Feeling well since last OV. Depression Review:  Patient is seen for followup of depression. Treatment includes Celexa. Ongoing symptoms include fatigue. She denies depressed mood, insomnia and hopelessness. She experiences the following side effects from the treatment: none.   Patient Active Problem List   Diagnosis Code    Environmental allergies Z91.09    Depression F32.9    C section     H/O: hysterectomy Z90.710    Hypovitaminosis D E55.9    Mixed hyperlipidemia E78.2    Benign essential hematuria R31.9    Diet-controlled diabetes mellitus ([address] E11.9    Hypertensive retinopathy H35.039    Encounter for medication monitoring Z51.81    At high risk for breast cancer Z91.89    Obesity, morbid ([address] E66.01    FH: colon cancer Z80.0       Current Outpatient Medications   Medication Sig Dispense Refill    fluticasone propionate (FLONASE) 50 mcg/actuation nasal spray 2 Sprays by Both Nostrils route daily as needed for Allergies. 3 Bottle 3    ketoconazole (NIZORAL) 2 % topical cream APPLY TO AFFECTED AREA(S) TWO TIMES A DAY AS NEEDED FOR SKIN IRRITATION OR ITCHING 15 g 1    gabapentin (NEURONTIN) 300 mg capsule Take 1 Cap by mouth two (2) times a day. Max Daily Amount: 600 mg. 60 Cap 3    omeprazole (PRILOSEC) 40 mg capsule TAKE ONE CAPSULE BY MOUTH DAILY 90 Cap 3    losartan-hydroCHLOROthiazide (HYZAAR) 50-12.5 mg per tablet TAKE ONE TABLET BY MOUTH DAILY 90 Tab 2    pravastatin (PRAVACHOL) 20 mg tablet Take 2 Tabs by mouth nightly. 180 Tab 3    levocetirizine (XYZAL) 5 mg tablet TAKE ONE TABLET BY MOUTH DAILY 30 Tab 10    loratadine (CLARITIN) 10 mg tablet Take 10 mg by mouth daily.  topiramate (TOPAMAX) 50 mg tablet TAKE ONE TABLET BY MOUTH TWICE A DAY 60 Tab 10    citalopram (CELEXA) 20 mg tablet TAKE ONE TABLET BY MOUTH DAILY 90 Tab 3    furosemide (LASIX) 20 mg tablet TAKE ONE TABLET BY MOUTH DAILY 90 Tab 2    diclofenac EC (VOLTAREN) 75 mg EC tablet Take 1 Tab by mouth two (2) times daily as needed for Pain (shoulder pain). Take with food. 30 Tab 1    cholecalciferol (VITAMIN D3) 1,000 unit tablet Take 1,000 Units by mouth every seven (7) days.  cyanocobalamin (VITAMIN B-12) 1,000 mcg tablet Take 1,000 mcg by mouth daily.  ALPRAZolam (XANAX) 0.5 mg tablet TAKE ONE TABLET BY MOUTH AT NIGHTLY AS NEEDED 90 Tab 0    albuterol (PROAIR HFA) 90 mcg/actuation inhaler Take 2 Puffs by inhalation every four (4) hours as needed for Wheezing or Shortness of Breath. Please instruct Pt on how to use an inhaler. 1 Inhaler 3    melatonin 3 mg tablet Take 3 mg by mouth nightly as needed.  acetaminophen (TYLENOL ARTHRITIS PAIN) 650 mg CR tablet Take 1,300 mg by mouth every six (6) hours as needed for Pain.       MULTIVITAMIN PO Take 1 Tab by mouth daily.      aspirin 81 mg chewable tablet Take 81 mg by mouth daily.  CALCIUM CARB/VIT D3/MINERALS (CALTRATE PLUS PO) Take 1 Tab by mouth every seven (7) days. Allergies   Allergen Reactions    Ciprofloxacin Other (comments)     Insomnia and hot flashes    Demerol [Meperidine] Other (comments)     hallucinations    Doxycycline Unknown (comments)       \"Feeling flushed\"    Floxin [Ofloxacin] Other (comments)     insomnia    Lisinopril Cough    Phenergan-Codeine [Promethazine-Codeine] Itching     Patient stated she started itching when she was tapering off of medication.     Prednisone Other (comments)     Has feelings of depression, \"weird thoughts\"       Past Medical History:   Diagnosis Date    C section 3/12/2010    Depression 3/12/2010    Diabetes ([address]     Environmental allergies 3/12/2010    H/O: hysterectomy 3/12/2010    High cholesterol 3/12/2010    HTN (hypertension) 3/12/2010    Pneumonia 2017       Past Surgical History:   Procedure Laterality Date    ENDOSCOPY, COLON, DIAGNOSTIC      repeat 10 yrs (Dr. Esther Rain)    HX BREAST BIOPSY  1980    both breast    HX BREAST BIOPSY  1986    left breast    HX  SECTION      x 3    HX CORNEAL TRANSPLANT  2011    left eye-     HX CYSTOSTOMY  3/2015    HX HYSTERECTOMY  1998       Family History   Problem Relation Age of Onset    Hypertension Mother     Other Mother         obesity, chf    Heart Disease Mother         CHF    Kidney Disease Mother     High Cholesterol Mother     Cancer Father         lung    Breast Cancer Sister     MS Sister     High Cholesterol Sister     Hypertension Sister     Cancer Sister         breast    Hypertension Brother     Liver Disease Brother         Hep C    High Cholesterol Brother     Cancer Brother         colon    Hypertension Sister     Diabetes Sister     High Cholesterol Sister     Hypertension Sister     Other Sister         obese    High Cholesterol Sister     Cancer Niece         cervical       Social History     Tobacco Use    Smoking status: Never Smoker    Smokeless tobacco: Never Used   Substance Use Topics    Alcohol use: Yes     Alcohol/week: 0.0 standard drinks     Comment: occasional          Lab Results   Component Value Date/Time    WBC 9.0 06/04/2019 01:29 PM    HGB 13.8 06/04/2019 01:29 PM    Hemoglobin (POC) 15.0 09/29/2016 05:26 PM    HCT 43.4 06/04/2019 01:29 PM    Hematocrit (POC) 44 09/29/2016 05:26 PM    PLATELET 777 68/95/4135 01:29 PM    MCV 90 06/04/2019 01:29 PM     Lab Results   Component Value Date/Time    Cholesterol, total 208 (H) 12/13/2019 08:19 AM    HDL Cholesterol 42 12/13/2019 08:19 AM    LDL, calculated 134 (H) 12/13/2019 08:19 AM    Triglyceride 162 (H) 12/13/2019 08:19 AM    CHOL/HDL Ratio 4.9 09/13/2010 11:06 AM     Lab Results   Component Value Date/Time    TSH 2.450 06/04/2019 01:29 PM      Lab Results   Component Value Date/Time    Sodium 141 12/13/2019 08:19 AM    Potassium 3.8 12/13/2019 08:19 AM    Chloride 104 12/13/2019 08:19 AM    CO2 21 12/13/2019 08:19 AM    Anion gap 5 03/11/2015 01:35 PM    Glucose 100 (H) 12/13/2019 08:19 AM    BUN 17 12/13/2019 08:19 AM    Creatinine 0.97 12/13/2019 08:19 AM    BUN/Creatinine ratio 18 12/13/2019 08:19 AM    GFR est AA 71 12/13/2019 08:19 AM    GFR est non-AA 61 12/13/2019 08:19 AM    Calcium 9.4 12/13/2019 08:19 AM    Bilirubin, total 0.3 12/13/2019 08:19 AM    ALT (SGPT) 12 12/13/2019 08:19 AM    Alk. phosphatase 71 12/13/2019 08:19 AM    Protein, total 6.9 12/13/2019 08:19 AM    Albumin 4.4 12/13/2019 08:19 AM    Globulin 3.5 03/11/2015 01:35 PM    A-G Ratio 1.8 12/13/2019 08:19 AM      Lab Results   Component Value Date/Time    Hemoglobin A1c 6.7 (H) 10/15/2013 12:55 PM    Hemoglobin A1c (POC) 5.9 12/13/2019 08:19 AM         Review of Systems   Constitutional: Negative for malaise/fatigue. HENT: Negative for congestion.     Eyes: Negative for blurred vision. Respiratory: Negative for cough and shortness of breath. Cardiovascular: Negative for chest pain, palpitations and leg swelling. Gastrointestinal: Positive for blood in stool. Negative for abdominal pain, diarrhea, heartburn and melena. She has seen blood in her stool about a few times in a week over the past few weeks. Usually comes on with a hard stool. She is due for her 5 year follow up colonoscopy. Genitourinary: Negative for dysuria, frequency and urgency. Musculoskeletal: Negative for back pain and joint pain. Neurological: Negative for dizziness, tingling and headaches. Endo/Heme/Allergies: Negative for environmental allergies. Psychiatric/Behavioral: Negative for depression. The patient does not have insomnia. Physical Exam  Vitals signs and nursing note reviewed. Constitutional:       Appearance: Normal appearance. She is well-developed. Comments: /72 (BP 1 Location: Left arm, BP Patient Position: Sitting)   Pulse 66   Temp 96.8 °F (36 °C) (Oral)   Resp 18   Ht 5' 5\" (1.651 m)   Wt 251 lb 9.6 oz (114.1 kg)   LMP 11/19/1998   SpO2 97%   BMI 41.87 kg/m²    HENT:      Right Ear: Tympanic membrane and ear canal normal.      Left Ear: Tympanic membrane and ear canal normal.      Nose: Nose normal. No mucosal edema or rhinorrhea. Mouth/Throat:      Mouth: Mucous membranes are moist.      Pharynx: Oropharynx is clear. Neck:      Musculoskeletal: Normal range of motion and neck supple. Thyroid: No thyromegaly. Vascular: No carotid bruit. Cardiovascular:      Rate and Rhythm: Normal rate and regular rhythm. Pulses: Normal pulses. Heart sounds: Normal heart sounds. No gallop. Pulmonary:      Effort: Pulmonary effort is normal.      Breath sounds: Normal breath sounds. Abdominal:      General: Bowel sounds are normal.      Palpations: Abdomen is soft. There is no mass. Tenderness: There is no abdominal tenderness. Musculoskeletal: Normal range of motion. General: Swelling (mild) present. Lymphadenopathy:      Cervical: No cervical adenopathy. Skin:     General: Skin is warm and dry. Psychiatric:         Mood and Affect: Mood normal.         ASSESSMENT and PLAN  Diagnoses and all orders for this visit:    1. Medicare annual wellness visit, subsequent    2. Essential hypertension  Stable   Discussed sodium restriction, high k rich diet, maintaining ideal body weight and regular exercise program such as daily walking 30 min perday 4-5 times per week, as physiologic means to achieve blood pressure control.  Medication compliance advised. 3. Mixed hyperlipidemia  -     LIPID PANEL    4. Diet-controlled diabetes mellitus (HCC)  -     HEMOGLOBIN A1C WITH EAG    5. Mild depression ([address]  We discussed ways to help reduce her anxiety. Have family member to staty in a certain area of the house and routine sanitation thru out the home. 6. Environmental allergies  Stable     7. Encounter for medication monitoring  -     METABOLIC PANEL, COMPREHENSIVE  -     CBC W/O DIFF    8. Blood in stool  Due for her colonoscopy. -     REFERRAL TO GASTROENTEROLOGY          reviewed diet, exercise and weight control  cardiovascular risk and specific lipid/LDL goals reviewed  reviewed medications and side effects in detail  specific diabetic recommendations: low cholesterol diet, weight control and daily exercise discussed and glycohemoglobin and other lab monitoring discussed     I have discussed diagnosis listed in this note with pt and/or family. I have discussed treatment plans and options and the risk/benefit analysis of those options, including safe use of medications and possible medication side effects. Through the use of shared decision making we have agreed to the above plan. The patient has received an after-visit summary and questions were answered concerning future plans and follow up.   Advise pt of any urgent changes then to proceed to the ER.

## 2020-06-17 NOTE — PROGRESS NOTES
Chief Complaint   Patient presents with    Annual Wellness Visit     medicare     Patient states she has been feeling stressed/anxious due to the COVID 19. A1C 12/13/2019    Mammogram 1/8/2020    Colonoscopy 4/2/2015 by Dr. Jon Hinojosa repeat in 5 years. Patient had a colonoscopy scheduled for 4/3/2020 but patient cancelled due to the COVID 19. Patient states she will call to reschedule. Patient states she had an eye exam 2/2020 with Dr. Jose Mckeon. 1. Have you been to the ER, urgent care clinic since your last visit? Hospitalized since your last visit? No    2. Have you seen or consulted any other health care providers outside of the [address] [place] [address] since your last visit? Include any pap smears or colon screening.  No

## 2020-06-19 ENCOUNTER — HOSPITAL ENCOUNTER (OUTPATIENT)
Dept: LAB | Age: 65
Discharge: HOME | End: 2020-06-19
Payer: MEDICARE

## 2020-06-19 PROCEDURE — 80053 COMPREHEN METABOLIC PANEL: CPT

## 2020-06-19 PROCEDURE — 36415 COLL VENOUS BLD VENIPUNCTURE: CPT

## 2020-06-19 PROCEDURE — 85027 COMPLETE CBC AUTOMATED: CPT

## 2020-06-19 PROCEDURE — 83036 HEMOGLOBIN GLYCOSYLATED A1C: CPT

## 2020-06-19 PROCEDURE — 80061 LIPID PANEL: CPT

## 2020-06-20 LAB
ALBUMIN SERPL-MCNC: 4.4 G/DL (ref 3.8–4.8)
ALBUMIN/GLOB SERPL: 2.2 {RATIO} (ref 1.2–2.2)
ALP SERPL-CCNC: 62 IU/L (ref 39–117)
ALT SERPL-CCNC: 12 IU/L (ref 0–32)
AST SERPL-CCNC: 12 IU/L (ref 0–40)
BILIRUB SERPL-MCNC: 0.3 MG/DL (ref 0–1.2)
BUN SERPL-MCNC: 15 MG/DL (ref 8–27)
BUN/CREAT SERPL: 15 (ref 12–28)
CALCIUM SERPL-MCNC: 9.4 MG/DL (ref 8.7–10.3)
CHLORIDE SERPL-SCNC: 104 MMOL/L (ref 96–106)
CHOLEST SERPL-MCNC: 168 MG/DL (ref 100–199)
CO2 SERPL-SCNC: 25 MMOL/L (ref 20–29)
CREAT SERPL-MCNC: 0.99 MG/DL (ref 0.57–1)
ERYTHROCYTE [DISTWIDTH] IN BLOOD BY AUTOMATED COUNT: 13.4 % (ref 11.7–15.4)
EST. AVERAGE GLUCOSE BLD GHB EST-MCNC: 123 MG/DL
GLOBULIN SER CALC-MCNC: 2 G/DL (ref 1.5–4.5)
GLUCOSE SERPL-MCNC: 100 MG/DL (ref 65–99)
HBA1C MFR BLD: 5.9 % (ref 4.8–5.6)
HCT VFR BLD AUTO: 42.1 % (ref 34–46.6)
HDLC SERPL-MCNC: 40 MG/DL
HGB BLD-MCNC: 14 G/DL (ref 11.1–15.9)
INTERPRETATION, 910389: NORMAL
LDLC SERPL CALC-MCNC: 102 MG/DL (ref 0–99)
Lab: NORMAL
MCH RBC QN AUTO: 29.1 PG (ref 26.6–33)
MCHC RBC AUTO-ENTMCNC: 33.3 G/DL (ref 31.5–35.7)
MCV RBC AUTO: 88 FL (ref 79–97)
PLATELET # BLD AUTO: 325 X10E3/UL (ref 150–450)
POTASSIUM SERPL-SCNC: 3.9 MMOL/L (ref 3.5–5.2)
PROT SERPL-MCNC: 6.4 G/DL (ref 6–8.5)
RBC # BLD AUTO: 4.81 X10E6/UL (ref 3.77–5.28)
SODIUM SERPL-SCNC: 140 MMOL/L (ref 134–144)
TRIGL SERPL-MCNC: 132 MG/DL (ref 0–149)
VLDLC SERPL CALC-MCNC: 26 MG/DL (ref 5–40)
WBC # BLD AUTO: 9 X10E3/UL (ref 3.4–10.8)

## 2020-07-11 ENCOUNTER — HOSPITAL ENCOUNTER (OUTPATIENT)
Dept: PREADMISSION TESTING | Age: 65
Discharge: HOME | End: 2020-07-11
Payer: MEDICARE

## 2020-07-11 PROCEDURE — 87635 SARS-COV-2 COVID-19 AMP PRB: CPT

## 2020-07-12 LAB
SARS-COV-2, COV2NT: NOT DETECTED
SOURCE, COVRS: NORMAL
SPECIMEN SOURCE, FCOV2M: NORMAL

## 2020-07-15 ENCOUNTER — ANESTHESIA (OUTPATIENT)
Dept: ENDOSCOPY | Age: 65
End: 2020-07-15
Payer: MEDICARE

## 2020-07-15 ENCOUNTER — HOSPITAL ENCOUNTER (OUTPATIENT)
Age: 65
Setting detail: OUTPATIENT SURGERY
Discharge: HOME | End: 2020-07-15
Attending: INTERNAL MEDICINE | Admitting: INTERNAL MEDICINE
Payer: MEDICARE

## 2020-07-15 ENCOUNTER — ANESTHESIA EVENT (OUTPATIENT)
Dept: ENDOSCOPY | Age: 65
End: 2020-07-15
Payer: MEDICARE

## 2020-07-15 VITALS
TEMPERATURE: 98.2 F | OXYGEN SATURATION: 99 % | RESPIRATION RATE: 18 BRPM | DIASTOLIC BLOOD PRESSURE: 67 MMHG | SYSTOLIC BLOOD PRESSURE: 129 MMHG | HEIGHT: 66 IN | WEIGHT: 250.56 LBS | HEART RATE: 63 BPM | BODY MASS INDEX: 40.27 KG/M2

## 2020-07-15 PROCEDURE — 74011250636 HC RX REV CODE- 250/636: Performed by: ANESTHESIOLOGY

## 2020-07-15 PROCEDURE — 74011250636 HC RX REV CODE- 250/636: Performed by: INTERNAL MEDICINE

## 2020-07-15 PROCEDURE — 77030013992 HC SNR POLYP ENDOSC BSC -B: Performed by: INTERNAL MEDICINE

## 2020-07-15 PROCEDURE — 88305 TISSUE EXAM BY PATHOLOGIST: CPT

## 2020-07-15 PROCEDURE — 74011250637 HC RX REV CODE- 250/637: Performed by: INTERNAL MEDICINE

## 2020-07-15 PROCEDURE — 77030019988 HC FCPS ENDOSC DISP BSC -B: Performed by: INTERNAL MEDICINE

## 2020-07-15 PROCEDURE — 74011000250 HC RX REV CODE- 250: Performed by: ANESTHESIOLOGY

## 2020-07-15 PROCEDURE — 76060000031 HC ANESTHESIA FIRST 0.5 HR: Performed by: INTERNAL MEDICINE

## 2020-07-15 PROCEDURE — 76040000019: Performed by: INTERNAL MEDICINE

## 2020-07-15 RX ORDER — PROPOFOL 10 MG/ML
INJECTION, EMULSION INTRAVENOUS AS NEEDED
Status: DISCONTINUED | OUTPATIENT
Start: 2020-07-15 | End: 2020-07-15 | Stop reason: HOSPADM

## 2020-07-15 RX ORDER — DEXTROMETHORPHAN/PSEUDOEPHED 2.5-7.5/.8
1.2 DROPS ORAL
Status: DISCONTINUED | OUTPATIENT
Start: 2020-07-15 | End: 2020-07-15 | Stop reason: HOSPADM

## 2020-07-15 RX ORDER — ATROPINE SULFATE 0.1 MG/ML
0.5 INJECTION INTRAVENOUS
Status: DISCONTINUED | OUTPATIENT
Start: 2020-07-15 | End: 2020-07-15 | Stop reason: HOSPADM

## 2020-07-15 RX ORDER — FLUMAZENIL 0.1 MG/ML
0.2 INJECTION INTRAVENOUS
Status: DISCONTINUED | OUTPATIENT
Start: 2020-07-15 | End: 2020-07-15 | Stop reason: HOSPADM

## 2020-07-15 RX ORDER — LIDOCAINE HYDROCHLORIDE 20 MG/ML
INJECTION, SOLUTION EPIDURAL; INFILTRATION; INTRACAUDAL; PERINEURAL AS NEEDED
Status: DISCONTINUED | OUTPATIENT
Start: 2020-07-15 | End: 2020-07-15 | Stop reason: HOSPADM

## 2020-07-15 RX ORDER — SODIUM CHLORIDE 0.9 % (FLUSH) 0.9 %
5-40 SYRINGE (ML) INJECTION AS NEEDED
Status: DISCONTINUED | OUTPATIENT
Start: 2020-07-15 | End: 2020-07-15 | Stop reason: HOSPADM

## 2020-07-15 RX ORDER — MIDAZOLAM HYDROCHLORIDE 1 MG/ML
.25-5 INJECTION, SOLUTION INTRAMUSCULAR; INTRAVENOUS
Status: DISCONTINUED | OUTPATIENT
Start: 2020-07-15 | End: 2020-07-15 | Stop reason: HOSPADM

## 2020-07-15 RX ORDER — EPINEPHRINE 0.1 MG/ML
1 INJECTION INTRACARDIAC; INTRAVENOUS
Status: DISCONTINUED | OUTPATIENT
Start: 2020-07-15 | End: 2020-07-15 | Stop reason: HOSPADM

## 2020-07-15 RX ORDER — NALOXONE HYDROCHLORIDE 0.4 MG/ML
0.4 INJECTION, SOLUTION INTRAMUSCULAR; INTRAVENOUS; SUBCUTANEOUS
Status: DISCONTINUED | OUTPATIENT
Start: 2020-07-15 | End: 2020-07-15 | Stop reason: HOSPADM

## 2020-07-15 RX ORDER — SODIUM CHLORIDE 0.9 % (FLUSH) 0.9 %
5-40 SYRINGE (ML) INJECTION EVERY 8 HOURS
Status: DISCONTINUED | OUTPATIENT
Start: 2020-07-15 | End: 2020-07-15 | Stop reason: HOSPADM

## 2020-07-15 RX ORDER — SODIUM CHLORIDE 9 MG/ML
75 INJECTION, SOLUTION INTRAVENOUS CONTINUOUS
Status: DISCONTINUED | OUTPATIENT
Start: 2020-07-15 | End: 2020-07-15 | Stop reason: HOSPADM

## 2020-07-15 RX ADMIN — PROPOFOL 350 MG: 10 INJECTION, EMULSION INTRAVENOUS at 08:50

## 2020-07-15 RX ADMIN — SODIUM CHLORIDE 75 ML/HR: 900 INJECTION, SOLUTION INTRAVENOUS at 08:15

## 2020-07-15 RX ADMIN — SIMETHICONE 80 MG: 20 SUSPENSION/ DROPS ORAL at 08:48

## 2020-07-15 RX ADMIN — LIDOCAINE HYDROCHLORIDE 100 MG: 20 INJECTION, SOLUTION EPIDURAL; INFILTRATION; INTRACAUDAL; PERINEURAL at 08:25

## 2020-07-15 NOTE — PROCEDURES
[place] Office: [phone]      Esophagogastroduodenoscopy Procedure Note      Chapis Hernádnez  1955  [de-identified]    Indication: Cyst of esophagus     : Aurelio Lopez MD    Referring Provider:  Christi Aguilar MD    Sedation:  MAC anesthesia Propofol    Procedure Details:  After detailed informed consent was obtained for the procedure, with all risks and benefits of procedure explained the patient was taken to the endoscopy suite and placed in the left lateral decubitus position. Following sequential administration of sedation as per above, the endoscope was inserted into the mouth and advanced under direct vision to second portion of the duodenum. A careful inspection was made as the gastroscope was withdrawn, including a retroflexed view of the proximal stomach; findings and interventions are described below. Findings:     Esophagus:  A 8 mm round sub mucosal cystic lesion is noted at 36-37 cm. It has not increased in size from the last time. Another smaller 5 mm area is noted close to it. I took multiple biopsies. The esophageal mucosa in the proximal and distal esophagus is normal.   The squamo-columnar junction is at 40 cm where the Z-line was noted. Stomach: The gastric mucosa has erythema in the body with a few fundic gland appearing polyps: biopsies taken. .   The fundus was found to be normal with no lesions noted on retroflexion. The angularis is normal as well. Duodenum:   The bulb and post bulbar mucosa is normal in appearance. The duodenal folds are normal.     Therapies:    biopsy of esophagus cyst  biopsy of stomach body    Specimen:  Specimens were collected as described and send to the laboratory. Complications:   None were encountered during the procedure. EBL:  None. Recommendations:     -Acid suppression with a proton pump inhibitor. ,   -Await pathology. ,   -Consider EUS if she becomes symptomatic or cyst size increases. Thank you for entrusting me with this patient's care. Please do not hesitate to contact me with any questions or if I can be of assistance with any of your other patients' GI needs. Coy Preston MD  7/15/2020  8:37 AM

## 2020-07-15 NOTE — ANESTHESIA PREPROCEDURE EVALUATION
Relevant Problems   No relevant active problems       Anesthetic History               Review of Systems / Medical History      Pulmonary        Sleep apnea  Undiagnosed apnea    Pertinent negatives: No COPD and asthma     Neuro/Psych         Psychiatric history     Cardiovascular    Hypertension            Pertinent negatives: No past MI, angina, CHF and CABG  Exercise tolerance: >4 METS  Comments: 6-14-19·Baseline ECG: Normal EKG. ·7-12-19 Negative stress test.  ·7-12-19 Normal stress echocardiogram. Low risk study.    GI/Hepatic/Renal     GERD        Pertinent negatives: No liver disease and renal disease   Endo/Other    Diabetes (prediabetes, no meds)    Morbid obesity     Other Findings            Physical Exam    Airway  Mallampati: III  TM Distance: > 6 cm  Neck ROM: normal range of motion   Mouth opening: Normal     Cardiovascular  Regular rate and rhythm,  S1 and S2 normal,  no murmur, click, rub, or gallop             Dental  No notable dental hx       Pulmonary  Breath sounds clear to auscultation               Abdominal  GI exam deferred       Other Findings            Anesthetic Plan    ASA: 3  Anesthesia type: total IV anesthesia          Induction: Intravenous  Anesthetic plan and risks discussed with: Patient

## 2020-07-15 NOTE — DISCHARGE INSTRUCTIONS
[place] Office: [phone]    Amari Acuña  [de-identified]  1955    EGD/COLONOSCOPY DISCHARGE INSTRUCTIONS  Discomfort:  Sore throat- throat lozenges or warm salt water gargle  redness at IV site- apply warm compress to area; if redness or soreness persist- contact your physician  Gaseous discomfort- walking, belching will help relieve any discomfort  You may not operate a vehicle for 12 hours  You may not engage in an occupation involving machinery or appliances for rest of today. You may not drink alcoholic beverages for at least 12 hours  Avoid making any critical decisions for at least 24 hour  DIET  You may resume your regular diet - however -  remember your colon is empty and a heavy meal will produce gas. Avoid these foods:  fried / greasy foods, excessive carbonated drinks or too much caffeine  MEDICATIONS   Regarding Aspirin or Nonsteroidal medications specifically, please see below. ACTIVITY  You may resume your normal daily activities. Spend the remainder of the day resting -  avoid any strenuous activity. CALL M.D. ANY SIGN OF   Increasing pain, nausea, vomiting  Abdominal distension (swelling)  New increased bleeding (oral or rectal)  Fever (chills)  Pain in chest area  Bloody discharge from nose or mouth  Shortness of breath    You may not take any Advil, Aspirin, Ibuprofen, Motrin, Aleve, or Goodys for 7 days, ONLY  Tylenol as needed for pain. Follow-up Instructions:   Call  Coy Nielsen MD for any questions or concerns  Results of procedure / biopsy in 7 days   Telephone # [phone]      Follow-up Information    None

## 2020-07-15 NOTE — H&P
Pre-endoscopy H and P    The patient was seen and examined in the room/pre-op holding area. The airway was assessed and documented. The problem list, past medical history, and medications were reviewed. Patient Active Problem List   Diagnosis Code    Environmental allergies Z91.09    Depression F32.9    C section     H/O: hysterectomy Z90.710    Hypovitaminosis D E55.9    Mixed hyperlipidemia E78.2    Benign essential hematuria R31.9    Diet-controlled diabetes mellitus ([facility] E11.9    Hypertensive retinopathy H35.039    Encounter for medication monitoring Z51.81    At high risk for breast cancer Z91.89    Obesity, morbid ([facility] E66.01    FH: colon cancer Z80.0    Type 2 diabetes mellitus with diabetic neuropathy (HCC) E11.40     Social History     Socioeconomic History    Marital status:      Spouse name: Not on file    Number of children: Not on file    Years of education: Not on file    Highest education level: Not on file   Occupational History    Not on file   Social Needs    Financial resource strain: Not on file    Food insecurity     Worry: Not on file     Inability: Not on file    Transportation needs     Medical: Not on file     Non-medical: Not on file   Tobacco Use    Smoking status: Never Smoker    Smokeless tobacco: Never Used   Substance and Sexual Activity    Alcohol use:  Yes     Alcohol/week: 0.0 standard drinks     Comment: occasional    Drug use: No    Sexual activity: Yes     Partners: Male     Birth control/protection: Surgical   Lifestyle    Physical activity     Days per week: Not on file     Minutes per session: Not on file    Stress: Not on file   Relationships    Social connections     Talks on phone: Not on file     Gets together: Not on file     Attends Hinduism service: Not on file     Active member of club or organization: Not on file     Attends meetings of clubs or organizations: Not on file     Relationship status: Not on file    Intimate partner violence     Fear of current or ex partner: Not on file     Emotionally abused: Not on file     Physically abused: Not on file     Forced sexual activity: Not on file   Other Topics Concern    Not on file   Social History Narrative    Not on file     Past Medical History:   Diagnosis Date    C section 3/12/2010    Depression 3/12/2010    Diabetes ([address]     Environmental allergies 3/12/2010    GERD (gastroesophageal reflux disease)     H/O: hysterectomy 3/12/2010    High cholesterol 3/12/2010    HTN (hypertension) 3/12/2010    Pneumonia 04/2017         Prior to Admission Medications   Prescriptions Last Dose Informant Patient Reported? Taking? ALPRAZolam (XANAX) 0.5 mg tablet 7/8/2020 at Unknown time  No Yes   Sig: TAKE ONE TABLET BY MOUTH AT NIGHTLY AS NEEDED   CALCIUM CARB/VIT D3/MINERALS (CALTRATE PLUS PO) 7/14/2020 at Unknown time  Yes Yes   Sig: Take 1 Tab by mouth daily. MULTIVITAMIN PO 7/13/2020  Yes Yes   Sig: Take 1 Tab by mouth daily. acetaminophen (TYLENOL ARTHRITIS PAIN) 650 mg CR tablet 7/8/2020 at Unknown time  Yes Yes   Sig: Take 1,300 mg by mouth every six (6) hours as needed for Pain. albuterol (PROAIR HFA) 90 mcg/actuation inhaler Unknown at Unknown time  No No   Sig: Take 2 Puffs by inhalation every four (4) hours as needed for Wheezing or Shortness of Breath. Please instruct Pt on how to use an inhaler. aspirin 81 mg chewable tablet 7/14/2020 at Unknown time  Yes Yes   Sig: Take 81 mg by mouth daily. cholecalciferol (VITAMIN D3) 1,000 unit tablet 7/13/2020  Yes Yes   Sig: Take 1,000 Units by mouth every seven (7) days. citalopram (CELEXA) 20 mg tablet 7/14/2020 at Unknown time  No Yes   Sig: TAKE ONE TABLET BY MOUTH DAILY   cyanocobalamin (VITAMIN B-12) 1,000 mcg tablet 7/13/2020  Yes Yes   Sig: Take 1,000 mcg by mouth daily. diclofenac (Voltaren) 1 % gel 7/8/2020 at Unknown time  Yes Yes   Sig: Apply topically to affected area every other night.    fluticasone propionate (FLONASE) 50 mcg/actuation nasal spray 2020  No Yes   Si Sprays by Both Nostrils route daily as needed for Allergies. furosemide (LASIX) 20 mg tablet 2020  No Yes   Sig: TAKE ONE TABLET BY MOUTH DAILY   gabapentin (NEURONTIN) 300 mg capsule Not Taking at Unknown time  Yes No   Sig: Take 300 mg by mouth two (2) times daily as needed for Pain.   ketoconazole (NIZORAL) 2 % topical cream 2020  No Yes   Sig: APPLY TO AFFECTED AREA(S) TWO TIMES A DAY AS NEEDED FOR SKIN IRRITATION OR ITCHING   levocetirizine (XYZAL) 5 mg tablet 2020 at Unknown time  No Yes   Sig: TAKE ONE TABLET BY MOUTH DAILY   loratadine (CLARITIN) 10 mg tablet 2020 at Unknown time  Yes Yes   Sig: Take 10 mg by mouth daily. losartan-hydroCHLOROthiazide (HYZAAR) 50-12.5 mg per tablet 2020 at Unknown time  No Yes   Sig: TAKE ONE TABLET BY MOUTH DAILY   melatonin 3 mg tablet Unknown at Unknown time  Yes No   Sig: Take 3 mg by mouth nightly as needed. omeprazole (PRILOSEC) 40 mg capsule 2020 at Unknown time  No Yes   Sig: TAKE ONE CAPSULE BY MOUTH DAILY   pravastatin (PRAVACHOL) 20 mg tablet 2020 at Unknown time  No Yes   Sig: Take 2 Tabs by mouth nightly. topiramate (TOPAMAX) 50 mg tablet 2020  No No   Sig: TAKE ONE TABLET BY MOUTH TWICE A DAY      Facility-Administered Medications: None           The review of systems is:  Negative  for shortness of breath or chest pain      The heart, lungs, and mental status were satisfactory for the administration of deep sedation and for the procedure. I discussed with the patient the objectives, risks, consequences and alternatives to the procedure.       Fannie Dumont MD  7/15/2020  8:29 AM

## 2020-07-15 NOTE — PERIOP NOTES
Medications     Medication Rate/Dose/Volume Action Route Date Time   Administering User Audit    lidocaine (PF) 2% (mg) 100 mg Given IntraVENous 07/15/20  0825  John Ortiz DO     propofol 10 mg/mL (mg) 350 mg Given IntraVENous 07/15/20  0850  Alcestersabina Dunlap DO      Comment:  Titrated over case        0.9% sodium chloride infusion (mL) 450 mL Anesthesia Volume Adjustment IntraVENous 07/15/20  0850  Alcesterel Dunlap DO

## 2020-07-15 NOTE — PROCEDURES
Colonoscopy Procedure Note    Lucien Byrne  1955  [de-identified]    Indications:  Please see below. Pre-operative Diagnosis: HEMATOCHEZIA, FAMILY HX OF COLON POLYPS and CRC    Post-operative Diagnosis: cecal polyp,internal hemorrhoids,diverticulosis      : Coy Lemos MD    Referring Provider: Murtaza Gallo MD    Sedation:  MAC anesthesia Propofol        Procedure Details:    After detailed informed consent was obtained with all risks and benefits of procedure explained and preoperative exam completed, the patient was taken to the endoscopy suite and placed in the left lateral decubitus position. Upon sequential sedation as per above, a digital rectal exam was performed  And was normal.  The Olympus videocolonoscope  was inserted in the rectum and carefully advanced to the cecum, which was identified by the ileocecal valve and appendiceal orifice. The quality of preparation was good. The colonoscope was slowly withdrawn with careful evaluation between folds. Retroflexion in the rectum was performed. Findings:     · A single, 4 mm sessile, cecal polyp was removed with a cold biopsy forceps. · Mild sigmoid diverticulosis. · Medium sized internal hemorrhoids are noted. · Rest of colonic mucosa is normal appearing. Therapies:  biopsy of colon cecum    Specimen:  - Specimens were collected as described above and sent to pathology. Complications: None were encountered during the procedure. EBL:  None. Recommendations:     -Await pathology. -Repeat colonoscopy in 4 years  -For recurrent bleeding consider hemorrhoid banding.    -Naturally, for new bleeding, unexplained weight loss,change in bowel habits and anemia, an earlier colonoscopy should be considered. Coy Lemos MD  7/15/2020  8:50 AM

## 2020-07-15 NOTE — ROUTINE PROCESS
Marie Cherise  1955  [de-identified]    Situation:  Verbal report received from: Pilar Castro RN  Procedure: Procedure(s):  ESOPHAGOGASTRODUODENOSCOPY (EGD)  COLONOSCOPY  ESOPHAGOGASTRODUODENAL (EGD) BIOPSY  ENDOSCOPIC POLYPECTOMY    Background:    Preoperative diagnosis: HEMATOCHEZIA, FAMILY HX OF COLON POLYPS, SECOND DEGREE HEMORRHIODS, ABNORMAL CT SCAN  Postoperative diagnosis: EGD - gastritis, small gastric polyp, mucosal cyst  colon - rectal polyp, diverticulosis and hemorrhoids    :  Dr. Rosario Junior  Assistant(s): Endoscopy Technician-1: Annalee Evans  Endoscopy RN-1: Brianna Britton RN    Specimens:   ID Type Source Tests Collected by Time Destination   1 : Gastric BX Preservative Gastric  eHctor Walters MD 7/15/2020 9481 Pathology   2 : Esophageal BX Preservative   Hector Walters MD 7/15/2020 5111 Pathology   3 : rectal polyp Preservative   Hector Walters MD 7/15/2020 0845 Pathology     H. Pylori  no    Assessment:  Intra-procedure medications       Anesthesia gave intra-procedure sedation and medications, see anesthesia flow sheet yes    Intravenous fluids: NS@ KVO     Vital signs stable       Abdominal assessment: round and soft       Recommendation:  Discharge patient per MD order.     Family or Friend   Rolena Constant  Permission to share finding with family or friend yes

## 2020-08-30 DIAGNOSIS — J20.9 BRONCHITIS WITH BRONCHOSPASM: ICD-10-CM

## 2020-09-04 RX ORDER — ALBUTEROL SULFATE 90 UG/1
2 AEROSOL, METERED RESPIRATORY (INHALATION)
Qty: 1 INHALER | Refills: 3 | Status: SHIPPED | OUTPATIENT
Start: 2020-09-04 | End: 2021-01-05 | Stop reason: SDUPTHER

## 2020-09-04 NOTE — TELEPHONE ENCOUNTER
Request for albuterol PRN. Last office visit 6/17/20, next office visit 21/2/20.  Refill pending for provider approval.

## 2020-11-09 NOTE — TELEPHONE ENCOUNTER
----- Message from Aleksander Aguillon sent at 11/9/2020  3:53 PM EST -----  Regarding: Dr. Terri Banda Yoni/Telephone  General Message/Vendor Calls    Caller's first and last name: Self       Reason for call: Abigail Osman required yes/no and why: Yes       Best contact number(s): [phone]      Details to clarify the request: Patient would like a callback to discuss sinus infection symptoms, please contact patient at [phone] to discuss.        Aleksander Aguillon

## 2020-11-09 NOTE — TELEPHONE ENCOUNTER
Patient complaining of headache, itchy eyes, stuffiness, no fever x 2 days, tried Tylenol sinus relief but not helping.

## 2020-11-10 RX ORDER — AZITHROMYCIN 250 MG/1
TABLET, FILM COATED ORAL
Qty: 6 TAB | Refills: 0 | OUTPATIENT
Start: 2020-11-10 | End: 2020-11-14

## 2020-11-11 RX ORDER — AZITHROMYCIN 250 MG/1
TABLET, FILM COATED ORAL
Qty: 6 TAB | Refills: 0 | Status: SHIPPED | OUTPATIENT
Start: 2020-11-11 | End: 2020-11-16

## 2020-11-24 ENCOUNTER — TELEPHONE (OUTPATIENT)
Dept: FAMILY MEDICINE CLINIC | Age: 65
End: 2020-11-24

## 2020-11-24 NOTE — TELEPHONE ENCOUNTER
----- Message from Jackie Rimaca sent at 11/24/2020 12:31 PM EST -----  Regarding: Dr. Lilia Ralph first and last name and relationship to patient (if not the patient): n/a  Best contact number: [phone]  Preferred date and time: 1/4/21 9:20am  Scheduled appointment date and time: unable to sched   Reason for appointment: 6mo follow up  Details to clarify the request: Ms. Osmany Portillo is requesting to r/s her 12/2 to 1/4/2021 w/Dr. Maynor Zamora at 9:20am for 6 mo follow up w/labs. [facility] did not allow to schedule.

## 2020-12-04 DIAGNOSIS — Z91.09 ENVIRONMENTAL ALLERGIES: ICD-10-CM

## 2020-12-04 RX ORDER — LEVOCETIRIZINE DIHYDROCHLORIDE 5 MG/1
TABLET, FILM COATED ORAL
Qty: 30 TAB | Refills: 9 | Status: SHIPPED | OUTPATIENT
Start: 2020-12-04 | End: 2022-01-18 | Stop reason: SDUPTHER

## 2021-01-05 ENCOUNTER — OFFICE VISIT (OUTPATIENT)
Dept: FAMILY MEDICINE CLINIC | Age: 66
End: 2021-01-05
Payer: MEDICARE

## 2021-01-05 VITALS
WEIGHT: 251.6 LBS | RESPIRATION RATE: 16 BRPM | HEIGHT: 65 IN | SYSTOLIC BLOOD PRESSURE: 118 MMHG | DIASTOLIC BLOOD PRESSURE: 67 MMHG | TEMPERATURE: 97.1 F | OXYGEN SATURATION: 96 % | HEART RATE: 78 BPM | BODY MASS INDEX: 41.92 KG/M2

## 2021-01-05 DIAGNOSIS — E11.9 DIET-CONTROLLED DIABETES MELLITUS (HCC): ICD-10-CM

## 2021-01-05 DIAGNOSIS — E78.2 MIXED HYPERLIPIDEMIA: ICD-10-CM

## 2021-01-05 DIAGNOSIS — Z91.09 ENVIRONMENTAL ALLERGIES: ICD-10-CM

## 2021-01-05 DIAGNOSIS — F32.A MILD DEPRESSION: ICD-10-CM

## 2021-01-05 DIAGNOSIS — I10 ESSENTIAL HYPERTENSION: Primary | ICD-10-CM

## 2021-01-05 DIAGNOSIS — Z51.81 ENCOUNTER FOR MEDICATION MONITORING: ICD-10-CM

## 2021-01-05 DIAGNOSIS — Z23 NEED FOR SHINGLES VACCINE: ICD-10-CM

## 2021-01-05 DIAGNOSIS — Z23 ENCOUNTER FOR IMMUNIZATION: ICD-10-CM

## 2021-01-05 DIAGNOSIS — R07.89 ATYPICAL CHEST PAIN: ICD-10-CM

## 2021-01-05 DIAGNOSIS — Z12.31 ENCOUNTER FOR SCREENING MAMMOGRAM FOR BREAST CANCER: ICD-10-CM

## 2021-01-05 PROCEDURE — G8536 NO DOC ELDER MAL SCRN: HCPCS | Performed by: FAMILY MEDICINE

## 2021-01-05 PROCEDURE — 1101F PT FALLS ASSESS-DOCD LE1/YR: CPT | Performed by: FAMILY MEDICINE

## 2021-01-05 PROCEDURE — G8752 SYS BP LESS 140: HCPCS | Performed by: FAMILY MEDICINE

## 2021-01-05 PROCEDURE — 3017F COLORECTAL CA SCREEN DOC REV: CPT | Performed by: FAMILY MEDICINE

## 2021-01-05 PROCEDURE — 90732 PPSV23 VACC 2 YRS+ SUBQ/IM: CPT | Performed by: FAMILY MEDICINE

## 2021-01-05 PROCEDURE — G8399 PT W/DXA RESULTS DOCUMENT: HCPCS | Performed by: FAMILY MEDICINE

## 2021-01-05 PROCEDURE — G8417 CALC BMI ABV UP PARAM F/U: HCPCS | Performed by: FAMILY MEDICINE

## 2021-01-05 PROCEDURE — 1090F PRES/ABSN URINE INCON ASSESS: CPT | Performed by: FAMILY MEDICINE

## 2021-01-05 PROCEDURE — G9717 DOC PT DX DEP/BP F/U NT REQ: HCPCS | Performed by: FAMILY MEDICINE

## 2021-01-05 PROCEDURE — G8427 DOCREV CUR MEDS BY ELIG CLIN: HCPCS | Performed by: FAMILY MEDICINE

## 2021-01-05 PROCEDURE — 3046F HEMOGLOBIN A1C LEVEL >9.0%: CPT | Performed by: FAMILY MEDICINE

## 2021-01-05 PROCEDURE — 2022F DILAT RTA XM EVC RTNOPTHY: CPT | Performed by: FAMILY MEDICINE

## 2021-01-05 PROCEDURE — G8754 DIAS BP LESS 90: HCPCS | Performed by: FAMILY MEDICINE

## 2021-01-05 PROCEDURE — G0463 HOSPITAL OUTPT CLINIC VISIT: HCPCS | Performed by: FAMILY MEDICINE

## 2021-01-05 PROCEDURE — 99214 OFFICE O/P EST MOD 30 MIN: CPT | Performed by: FAMILY MEDICINE

## 2021-01-05 PROCEDURE — 93005 ELECTROCARDIOGRAM TRACING: CPT | Performed by: FAMILY MEDICINE

## 2021-01-05 PROCEDURE — 93010 ELECTROCARDIOGRAM REPORT: CPT | Performed by: FAMILY MEDICINE

## 2021-01-05 PROCEDURE — G9899 SCRN MAM PERF RSLTS DOC: HCPCS | Performed by: FAMILY MEDICINE

## 2021-01-05 RX ORDER — ALBUTEROL SULFATE 90 UG/1
2 AEROSOL, METERED RESPIRATORY (INHALATION)
Qty: 1 INHALER | Refills: 3 | Status: SHIPPED | OUTPATIENT
Start: 2021-01-05 | End: 2022-01-18 | Stop reason: SDUPTHER

## 2021-01-05 NOTE — PROGRESS NOTES
Chief Complaint   Patient presents with    Hypertension     f/u    Cholesterol Problem     f/u     1. Have you been to the ER, urgent care clinic since your last visit? Hospitalized since your last visit? NO    2. Have you seen or consulted any other health care providers outside of the [address] [place] [address] since your last visit? Include any pap smears or colon screening. Yes, 2020 Dr. Quiles    Concerns today: uneasy feeling or itching in the back of left calf, weight, anxiety, cousin  on new years day    Mammogram 2020    Colonoscopy  7/15/20 Alvaro Jeff  3 years    A1C 20    Eye Exam  10/12/20 Dr Nadia Rivera placed for Pneumo 23  per Verbal Order from Dr. Durbin on 2021 due to need    Pneumo 23 0.5 ml given in right arm IM, no reaction noted.

## 2021-01-05 NOTE — PROGRESS NOTES
HISTORY OF PRESENT ILLNESS  Denise Valles is a 76 y.o. female. HPI   Follow up on chronic medical problems. Overall has been feeling well. Doing the precautionary measures at home to reduce risks of exposure COVID19. She has been more anxious related to her family(her son and his wife and 2 kids) that lives with her being in and out out the home. Pt is wearing mask when she is going out. No known sick contacts or known exposure to [employer] [address]. Cardiovascular Review:  The patient has hypertension and hyperlipidemia. C/o chest discomfort a few times over the past several weeks. Seem to come on with increased anxiety. Feels like an ache. No pressure or chest tightness. Nonexertional.  No radiation of pain. No diaphoresis or SOB. No palpitations. Diet and Lifestyle: generally follows a low fat low cholesterol diet, generally follows a low sodium diet, follows a diabetic diet regularly, exercises sporadically  Home BP Monitoring: is not measured at home. Pertinent ROS: taking medications as instructed, no medication side effects noted, no TIA's, no chest pain on exertion, no dyspnea on exertion, slight swelling of ankles. DM type II follow up:  Diet controlled. Compliant w/ diabetic diet, and exercise. Has not been cheking BS recently. Denies any tingling sensation, polyuria and polydipsia. No blurred vision. No significant weight changes. Feeling well since last OV. Depression Review:  Patient is seen for followup of depression. Treatment includes Celexa. Ongoing symptoms include fatigue. She denies depressed mood, insomnia and hopelessness. She experiences the following side effects from the treatment: none.   Patient Active Problem List   Diagnosis Code    Environmental allergies Z91.09    Depression F32.9    C section     H/O: hysterectomy Z90.710    Hypovitaminosis D E55.9    Mixed hyperlipidemia E78.2    Benign essential hematuria N02.9    Diet-controlled diabetes mellitus ([facility] [address] E11.9    Hypertensive retinopathy H35.039    Encounter for medication monitoring Z51.81    At high risk for breast cancer Z91.89    Obesity, morbid (HCC) E66.01    FH: colon cancer Z80.0    Type 2 diabetes mellitus with diabetic neuropathy (HCC) E11.40       Current Outpatient Medications   Medication Sig Dispense Refill    levocetirizine (XYZAL) 5 mg tablet TAKE ONE TABLET BY MOUTH DAILY 30 Tab 9    gabapentin (NEURONTIN) 300 mg capsule TAKE ONE CAPSULE BY MOUTH TWICE A DAY 60 Cap 5    albuterol (ProAir HFA) 90 mcg/actuation inhaler Take 2 Puffs by inhalation every four (4) hours as needed for Wheezing or Shortness of Breath. Please instruct Pt on how to use an inhaler. 1 Inhaler 3    citalopram (CELEXA) 20 mg tablet TAKE ONE TABLET BY MOUTH DAILY 90 Tab 3    diclofenac (Voltaren) 1 % gel Apply topically to affected area every other night.  fluticasone propionate (FLONASE) 50 mcg/actuation nasal spray 2 Sprays by Both Nostrils route daily as needed for Allergies. 3 Bottle 3    ketoconazole (NIZORAL) 2 % topical cream APPLY TO AFFECTED AREA(S) TWO TIMES A DAY AS NEEDED FOR SKIN IRRITATION OR ITCHING 15 g 1    omeprazole (PRILOSEC) 40 mg capsule TAKE ONE CAPSULE BY MOUTH DAILY 90 Cap 3    losartan-hydroCHLOROthiazide (HYZAAR) 50-12.5 mg per tablet TAKE ONE TABLET BY MOUTH DAILY 90 Tab 2    pravastatin (PRAVACHOL) 20 mg tablet Take 2 Tabs by mouth nightly. 180 Tab 3    loratadine (CLARITIN) 10 mg tablet Take 10 mg by mouth daily.  topiramate (TOPAMAX) 50 mg tablet TAKE ONE TABLET BY MOUTH TWICE A DAY 60 Tab 10    furosemide (LASIX) 20 mg tablet TAKE ONE TABLET BY MOUTH DAILY 90 Tab 2    cholecalciferol (VITAMIN D3) 1,000 unit tablet Take 1,000 Units by mouth every seven (7) days.  cyanocobalamin (VITAMIN B-12) 1,000 mcg tablet Take 1,000 mcg by mouth daily.       ALPRAZolam (XANAX) 0.5 mg tablet TAKE ONE TABLET BY MOUTH AT NIGHTLY AS NEEDED 90 Tab 0    melatonin 3 mg tablet Take 3 mg by mouth nightly as needed.  acetaminophen (TYLENOL ARTHRITIS PAIN) 650 mg CR tablet Take 1,300 mg by mouth every six (6) hours as needed for Pain.  MULTIVITAMIN PO Take 1 Tab by mouth daily.  aspirin 81 mg chewable tablet Take 81 mg by mouth daily.  CALCIUM CARB/VIT D3/MINERALS (CALTRATE PLUS PO) Take 1 Tab by mouth daily. Allergies   Allergen Reactions    Ciprofloxacin Other (comments)     Insomnia and hot flashes    Demerol [Meperidine] Other (comments)     hallucinations    Doxycycline Unknown (comments)       \"Feeling flushed\"    Floxin [Ofloxacin] Other (comments)     insomnia    Lisinopril Cough    Phenergan-Codeine [Promethazine-Codeine] Itching     Patient stated she started itching when she was tapering off of medication.     Prednisone Other (comments)     Has feelings of depression, \"weird thoughts\"         Past Medical History:   Diagnosis Date    C section 3/12/2010    Depression 3/12/2010    Diabetes ([address]     Environmental allergies 3/12/2010    GERD (gastroesophageal reflux disease)     H/O: hysterectomy 3/12/2010    High cholesterol 3/12/2010    HTN (hypertension) 3/12/2010    Pneumonia 2017         Past Surgical History:   Procedure Laterality Date    COLONOSCOPY N/A 7/15/2020    COL[facility]cio [facility]ak [facility] ENDOSCOPY    ENDOSCOPY, COLON, DIAGNOSTIC      repeat 10 yrs (Dr. 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-     HX CYSTOSTOMY  3/2015    HX HYSTERECTOMY  1998         Family History   Problem Relation Age of Onset    Hypertension Mother     Other Mother         obesity, chf    Heart Disease Mother         CHF    Kidney Disease Mother     High Cholesterol Mother     Cancer Father         lung    Breast Cancer Sister     MS Sister     High Cholesterol Sister     Hypertension Sister     Cancer Sister         breast    Hypertension Brother     Liver Disease Brother         Hep C    High Cholesterol Brother     Cancer Brother         colon    Hypertension Sister     Diabetes Sister     High Cholesterol Sister     Hypertension Sister     Other Sister         obese    High Cholesterol Sister     Cancer Niece         cervical       Social History     Tobacco Use    Smoking status: Never Smoker    Smokeless tobacco: Never Used   Substance Use Topics    Alcohol use: Yes     Alcohol/week: 0.0 standard drinks     Comment: occasional          Lab Results   Component Value Date/Time    WBC 9.0 06/19/2020 12:27 PM    HGB 14.0 06/19/2020 12:27 PM    Hemoglobin (POC) 15.0 09/29/2016 05:26 PM    HCT 42.1 06/19/2020 12:27 PM    Hematocrit (POC) 44 09/29/2016 05:26 PM    PLATELET 960 15/91/7206 12:27 PM    MCV 88 06/19/2020 12:27 PM     Lab Results   Component Value Date/Time    Cholesterol, total 168 06/19/2020 12:27 PM    HDL Cholesterol 40 06/19/2020 12:27 PM    LDL, calculated 102 (H) 06/19/2020 12:27 PM    Triglyceride 132 06/19/2020 12:27 PM    CHOL/HDL Ratio 4.9 09/13/2010 11:06 AM     Lab Results   Component Value Date/Time    TSH 2.450 06/04/2019 01:29 PM      Lab Results   Component Value Date/Time    Sodium 140 06/19/2020 12:27 PM    Potassium 3.9 06/19/2020 12:27 PM    Chloride 104 06/19/2020 12:27 PM    CO2 25 06/19/2020 12:27 PM    Anion gap 5 03/11/2015 01:35 PM    Glucose 100 (H) 06/19/2020 12:27 PM    BUN 15 06/19/2020 12:27 PM    Creatinine 0.99 06/19/2020 12:27 PM    BUN/Creatinine ratio 15 06/19/2020 12:27 PM    GFR est AA 69 06/19/2020 12:27 PM    GFR est non-AA 60 06/19/2020 12:27 PM    Calcium 9.4 06/19/2020 12:27 PM    Bilirubin, total 0.3 06/19/2020 12:27 PM    ALT (SGPT) 12 06/19/2020 12:27 PM    Alk.  phosphatase 62 06/19/2020 12:27 PM    Protein, total 6.4 06/19/2020 12:27 PM    Albumin 4.4 06/19/2020 12:27 PM    Globulin 3.5 03/11/2015 01:35 PM    A-G Ratio 2.2 06/19/2020 12:27 PM      Lab Results   Component Value Date/Time    Hemoglobin A1c 5.9 (H) 06/19/2020 12:27 PM    Hemoglobin A1c (POC) 5.9 12/13/2019 08:19 AM         Review of Systems   Constitutional: Negative for malaise/fatigue. HENT: Negative for congestion. Eyes: Negative for blurred vision. Respiratory: Negative for cough and shortness of breath. Cardiovascular: Negative for palpitations and leg swelling. Gastrointestinal: Negative for abdominal pain, constipation and heartburn. Genitourinary: Negative for dysuria, frequency and urgency. Musculoskeletal: Negative for back pain and joint pain. Neurological: Negative for dizziness, tingling and headaches. Endo/Heme/Allergies: Negative for environmental allergies. Psychiatric/Behavioral: Negative for depression. The patient does not have insomnia. Physical Exam  Vitals signs and nursing note reviewed. Constitutional:       Appearance: Normal appearance. She is well-developed. Comments: /67 (BP 1 Location: Left arm, BP Patient Position: Sitting)   Pulse 78   Temp 97.1 °F (36.2 °C) (Temporal)   Resp 16   Ht 5' 5\" (1.651 m)   Wt 251 lb 9.6 oz (114.1 kg)   LMP 11/19/1998   SpO2 96%   BMI 41.87 kg/m²      HENT:      Right Ear: Tympanic membrane and ear canal normal.      Left Ear: Tympanic membrane and ear canal normal.      Nose: Nose normal. No mucosal edema or rhinorrhea. Mouth/Throat:      Mouth: Mucous membranes are moist.      Pharynx: Oropharynx is clear. Neck:      Musculoskeletal: Normal range of motion and neck supple. Thyroid: No thyromegaly. Vascular: No carotid bruit. Cardiovascular:      Rate and Rhythm: Normal rate and regular rhythm. Pulses: Normal pulses. Heart sounds: Normal heart sounds. No gallop. Pulmonary:      Effort: Pulmonary effort is normal.      Breath sounds: Normal breath sounds.    Abdominal:      General: Bowel sounds are normal.      Palpations: Abdomen is soft. There is no mass. Tenderness: There is no abdominal tenderness. Musculoskeletal: Normal range of motion. General: Swelling (mild) present. Lymphadenopathy:      Cervical: No cervical adenopathy. Skin:     General: Skin is warm and dry. Psychiatric:         Mood and Affect: Mood normal.       ASSESSMENT and PLAN  Diagnoses and all orders for this visit:    1. Essential hypertension  Stable     2. Mixed hyperlipidemia  -     LIPID PANEL; Future    3. Diet-controlled diabetes mellitus ([address]  -     HEMOGLOBIN A1C WITH EAG; Future    4. Mild depression (HCC)  Stable     5. Environmental allergies  -     Refill albuterol (ProAir HFA) 90 mcg/actuation inhaler; Take 2 Puffs by inhalation every four (4) hours as needed for Wheezing or Shortness of Breath. Please instruct Pt on how to use an inhaler. 6. Encounter for medication monitoring  -     METABOLIC PANEL, COMPREHENSIVE; Future    7. Encounter for screening mammogram for breast cancer  -     [facility] 3D [address] W MAMMO BI SCREENING INCL CAD; Future    8. Atypical chest pain  -     AMB POC EKG ROUTINE W/ 12 LEADS, INTER & REP  -  WNL, NSR    9. Encounter for immunization  -     PNEUMOCOCCAL POLYSACCHARIDE VACCINE, 23-VALENT, ADULT OR IMMUNOSUPPRESSED PT DOSE,  -     NM IMMUNIZ ADMIN,1 SINGLE/COMB VAC/TOXOID    10. Need for shingles vaccine  -     varicella-zoster recombinant, PF, (SHINGRIX) 50 mcg/0.5 mL susr injection; 0.5 mL by IntraMUSCular route once for 1 dose. Repeat second dose in 6 months. Follow-up and Dispositions    · Return in about 6 months (around 7/5/2021). reviewed diet, exercise and weight control  cardiovascular risk and specific lipid/LDL goals reviewed  reviewed medications and side effects in detail    I have discussed diagnosis listed in this note with pt and/or family.  I have discussed treatment plans and options and the risk/benefit analysis of those options, including safe use of medications and possible medication side effects. Through the use of shared decision making we have agreed to the above plan. The patient has received an after-visit summary and questions were answered concerning future plans and follow up. Advise pt of any urgent changes then to proceed to the ER.

## 2021-01-05 NOTE — PROGRESS NOTES
A1C 6/19/2020    Mammogram 1/8/2020    Colonoscopy 7/15/202 by Dr. Simin Lincoln repeat in 4 years. Eye exam 10/12/2020 with Dr. Patrick Potts. 1. Have you been to the ER, urgent care clinic since your last visit? Hospitalized since your last visit? No    2. Have you seen or consulted any other health care providers outside of the [address] [place] [address] since your last visit? Include any pap smears or colon screening.  No

## 2021-01-09 ENCOUNTER — HOSPITAL ENCOUNTER (OUTPATIENT)
Dept: LAB | Age: 66
Discharge: HOME | End: 2021-01-09
Payer: MEDICARE

## 2021-01-09 PROCEDURE — 80061 LIPID PANEL: CPT

## 2021-01-09 PROCEDURE — 36415 COLL VENOUS BLD VENIPUNCTURE: CPT

## 2021-01-09 PROCEDURE — 80053 COMPREHEN METABOLIC PANEL: CPT

## 2021-01-09 PROCEDURE — 83036 HEMOGLOBIN GLYCOSYLATED A1C: CPT

## 2021-01-11 LAB
ALBUMIN SERPL-MCNC: 4 G/DL (ref 3.8–4.8)
ALBUMIN/GLOB SERPL: 1.7 {RATIO} (ref 1.2–2.2)
ALP SERPL-CCNC: 74 IU/L (ref 39–117)
ALT SERPL-CCNC: 13 IU/L (ref 0–32)
AST SERPL-CCNC: 14 IU/L (ref 0–40)
BILIRUB SERPL-MCNC: 0.3 MG/DL (ref 0–1.2)
BUN SERPL-MCNC: 13 MG/DL (ref 8–27)
BUN/CREAT SERPL: 15 (ref 12–28)
CALCIUM SERPL-MCNC: 9.5 MG/DL (ref 8.7–10.3)
CHLORIDE SERPL-SCNC: 104 MMOL/L (ref 96–106)
CHOLEST SERPL-MCNC: 200 MG/DL (ref 100–199)
CO2 SERPL-SCNC: 25 MMOL/L (ref 20–29)
CREAT SERPL-MCNC: 0.85 MG/DL (ref 0.57–1)
EST. AVERAGE GLUCOSE BLD GHB EST-MCNC: 123 MG/DL
GLOBULIN SER CALC-MCNC: 2.4 G/DL (ref 1.5–4.5)
GLUCOSE SERPL-MCNC: 115 MG/DL (ref 65–99)
HBA1C MFR BLD: 5.9 % (ref 4.8–5.6)
HDLC SERPL-MCNC: 43 MG/DL
INTERPRETATION, 910389: NORMAL
LDLC SERPL CALC-MCNC: 128 MG/DL (ref 0–99)
Lab: NORMAL
POTASSIUM SERPL-SCNC: 4.1 MMOL/L (ref 3.5–5.2)
PROT SERPL-MCNC: 6.4 G/DL (ref 6–8.5)
SODIUM SERPL-SCNC: 143 MMOL/L (ref 134–144)
TRIGL SERPL-MCNC: 162 MG/DL (ref 0–149)
VLDLC SERPL CALC-MCNC: 29 MG/DL (ref 5–40)

## 2021-02-23 ENCOUNTER — HOSPITAL ENCOUNTER (OUTPATIENT)
Dept: MAMMOGRAPHY | Age: 66
Discharge: HOME | End: 2021-02-23
Attending: FAMILY MEDICINE
Payer: MEDICARE

## 2021-02-23 DIAGNOSIS — Z12.31 ENCOUNTER FOR SCREENING MAMMOGRAM FOR BREAST CANCER: ICD-10-CM

## 2021-02-23 PROCEDURE — 77063 BREAST TOMOSYNTHESIS BI: CPT

## 2021-03-05 DIAGNOSIS — I10 ESSENTIAL HYPERTENSION: Primary | ICD-10-CM

## 2021-03-05 RX ORDER — LOSARTAN POTASSIUM AND HYDROCHLOROTHIAZIDE 12.5; 5 MG/1; MG/1
TABLET ORAL
Qty: 90 TAB | Refills: 3 | Status: SHIPPED | OUTPATIENT
Start: 2021-03-05 | End: 2022-04-18

## 2021-03-18 DIAGNOSIS — I10 ESSENTIAL HYPERTENSION: ICD-10-CM

## 2021-03-18 RX ORDER — FUROSEMIDE 20 MG/1
TABLET ORAL
Qty: 90 TAB | Refills: 1 | Status: SHIPPED | OUTPATIENT
Start: 2021-03-18 | End: 2021-11-03

## 2021-07-13 ENCOUNTER — OFFICE VISIT (OUTPATIENT)
Dept: FAMILY MEDICINE CLINIC | Age: 66
End: 2021-07-13
Payer: MEDICARE

## 2021-07-13 VITALS
HEIGHT: 65 IN | OXYGEN SATURATION: 97 % | RESPIRATION RATE: 18 BRPM | SYSTOLIC BLOOD PRESSURE: 136 MMHG | WEIGHT: 261 LBS | TEMPERATURE: 98.9 F | HEART RATE: 70 BPM | BODY MASS INDEX: 43.49 KG/M2 | DIASTOLIC BLOOD PRESSURE: 68 MMHG

## 2021-07-13 DIAGNOSIS — L23.9 ALLERGIC DERMATITIS: ICD-10-CM

## 2021-07-13 DIAGNOSIS — Z00.00 MEDICARE ANNUAL WELLNESS VISIT, SUBSEQUENT: Primary | ICD-10-CM

## 2021-07-13 DIAGNOSIS — R19.8 ABDOMINAL FULLNESS IN RIGHT LOWER QUADRANT: ICD-10-CM

## 2021-07-13 DIAGNOSIS — E78.2 MIXED HYPERLIPIDEMIA: ICD-10-CM

## 2021-07-13 DIAGNOSIS — E11.9 DIET-CONTROLLED DIABETES MELLITUS (HCC): ICD-10-CM

## 2021-07-13 DIAGNOSIS — Z79.82 LONG TERM (CURRENT) USE OF ASPIRIN: ICD-10-CM

## 2021-07-13 DIAGNOSIS — Z51.81 ENCOUNTER FOR MEDICATION MONITORING: ICD-10-CM

## 2021-07-13 DIAGNOSIS — B35.6 TINEA CRURIS: ICD-10-CM

## 2021-07-13 DIAGNOSIS — I10 ESSENTIAL HYPERTENSION: ICD-10-CM

## 2021-07-13 DIAGNOSIS — F32.A MILD DEPRESSION: ICD-10-CM

## 2021-07-13 DIAGNOSIS — Z12.11 ENCOUNTER FOR SCREENING FECAL OCCULT BLOOD TESTING: ICD-10-CM

## 2021-07-13 DIAGNOSIS — E66.01 OBESITY, CLASS III, BMI 40-49.9 (MORBID OBESITY) (HCC): ICD-10-CM

## 2021-07-13 LAB
ALBUMIN SERPL-MCNC: 3.9 G/DL (ref 3.5–5)
ALBUMIN/GLOB SERPL: 1.4 {RATIO} (ref 1.1–2.2)
ALP SERPL-CCNC: 66 U/L (ref 45–117)
ALT SERPL-CCNC: 19 U/L (ref 12–78)
ANION GAP SERPL CALC-SCNC: 3 MMOL/L (ref 5–15)
AST SERPL-CCNC: 9 U/L (ref 15–37)
BILIRUB SERPL-MCNC: 0.4 MG/DL (ref 0.2–1)
BILIRUB UR QL STRIP: NEGATIVE
BUN SERPL-MCNC: 12 MG/DL (ref 6–20)
BUN/CREAT SERPL: 16 (ref 12–20)
CALCIUM SERPL-MCNC: 9.3 MG/DL (ref 8.5–10.1)
CHLORIDE SERPL-SCNC: 105 MMOL/L (ref 97–108)
CHOLEST SERPL-MCNC: 207 MG/DL
CO2 SERPL-SCNC: 33 MMOL/L (ref 21–32)
CREAT SERPL-MCNC: 0.77 MG/DL (ref 0.55–1.02)
CREAT UR-MCNC: 162 MG/DL
ERYTHROCYTE [DISTWIDTH] IN BLOOD BY AUTOMATED COUNT: 13.8 % (ref 11.5–14.5)
GLOBULIN SER CALC-MCNC: 2.7 G/DL (ref 2–4)
GLUCOSE POC: 93 MG/DL
GLUCOSE SERPL-MCNC: 101 MG/DL (ref 65–100)
GLUCOSE UR-MCNC: NEGATIVE MG/DL
HBA1C MFR BLD HPLC: 5.9 %
HCT VFR BLD AUTO: 42.6 % (ref 35–47)
HDLC SERPL-MCNC: 44 MG/DL
HDLC SERPL: 4.7 {RATIO} (ref 0–5)
HEMOCCULT STL QL: NEGATIVE
HGB BLD-MCNC: 13.7 G/DL (ref 11.5–16)
KETONES P FAST UR STRIP-MCNC: NEGATIVE MG/DL
LDLC SERPL CALC-MCNC: 136.8 MG/DL (ref 0–100)
MCH RBC QN AUTO: 29.5 PG (ref 26–34)
MCHC RBC AUTO-ENTMCNC: 32.2 G/DL (ref 30–36.5)
MCV RBC AUTO: 91.8 FL (ref 80–99)
MICROALBUMIN UR-MCNC: 1.08 MG/DL
MICROALBUMIN/CREAT UR-RTO: 7 MG/G (ref 0–30)
NRBC # BLD: 0 K/UL (ref 0–0.01)
NRBC BLD-RTO: 0 PER 100 WBC
PH UR STRIP: 6.5 [PH] (ref 4.6–8)
PLATELET # BLD AUTO: 288 K/UL (ref 150–400)
PMV BLD AUTO: 10.6 FL (ref 8.9–12.9)
POTASSIUM SERPL-SCNC: 4.4 MMOL/L (ref 3.5–5.1)
PROT SERPL-MCNC: 6.6 G/DL (ref 6.4–8.2)
PROT UR QL STRIP: NEGATIVE
RBC # BLD AUTO: 4.64 M/UL (ref 3.8–5.2)
SODIUM SERPL-SCNC: 141 MMOL/L (ref 136–145)
SP GR UR STRIP: 1.02 (ref 1–1.03)
TRIGL SERPL-MCNC: 131 MG/DL (ref ?–150)
UA UROBILINOGEN AMB POC: NORMAL (ref 0.2–1)
URINALYSIS CLARITY POC: CLEAR
URINALYSIS COLOR POC: YELLOW
URINE BLOOD POC: NORMAL
URINE LEUKOCYTES POC: NORMAL
URINE NITRITES POC: NEGATIVE
VALID INTERNAL CONTROL?: YES
VLDLC SERPL CALC-MCNC: 26.2 MG/DL
WBC # BLD AUTO: 9.1 K/UL (ref 3.6–11)

## 2021-07-13 PROCEDURE — 3017F COLORECTAL CA SCREEN DOC REV: CPT | Performed by: FAMILY MEDICINE

## 2021-07-13 PROCEDURE — 83036 HEMOGLOBIN GLYCOSYLATED A1C: CPT | Performed by: FAMILY MEDICINE

## 2021-07-13 PROCEDURE — 2022F DILAT RTA XM EVC RTNOPTHY: CPT | Performed by: FAMILY MEDICINE

## 2021-07-13 PROCEDURE — G8417 CALC BMI ABV UP PARAM F/U: HCPCS | Performed by: FAMILY MEDICINE

## 2021-07-13 PROCEDURE — G8754 DIAS BP LESS 90: HCPCS | Performed by: FAMILY MEDICINE

## 2021-07-13 PROCEDURE — G0463 HOSPITAL OUTPT CLINIC VISIT: HCPCS | Performed by: FAMILY MEDICINE

## 2021-07-13 PROCEDURE — G8752 SYS BP LESS 140: HCPCS | Performed by: FAMILY MEDICINE

## 2021-07-13 PROCEDURE — 1101F PT FALLS ASSESS-DOCD LE1/YR: CPT | Performed by: FAMILY MEDICINE

## 2021-07-13 PROCEDURE — G9717 DOC PT DX DEP/BP F/U NT REQ: HCPCS | Performed by: FAMILY MEDICINE

## 2021-07-13 PROCEDURE — 99214 OFFICE O/P EST MOD 30 MIN: CPT | Performed by: FAMILY MEDICINE

## 2021-07-13 PROCEDURE — G8536 NO DOC ELDER MAL SCRN: HCPCS | Performed by: FAMILY MEDICINE

## 2021-07-13 PROCEDURE — G9899 SCRN MAM PERF RSLTS DOC: HCPCS | Performed by: FAMILY MEDICINE

## 2021-07-13 PROCEDURE — 82947 ASSAY GLUCOSE BLOOD QUANT: CPT | Performed by: FAMILY MEDICINE

## 2021-07-13 PROCEDURE — 1090F PRES/ABSN URINE INCON ASSESS: CPT | Performed by: FAMILY MEDICINE

## 2021-07-13 PROCEDURE — 3044F HG A1C LEVEL LT 7.0%: CPT | Performed by: FAMILY MEDICINE

## 2021-07-13 PROCEDURE — 82272 OCCULT BLD FECES 1-3 TESTS: CPT | Performed by: FAMILY MEDICINE

## 2021-07-13 PROCEDURE — G8427 DOCREV CUR MEDS BY ELIG CLIN: HCPCS | Performed by: FAMILY MEDICINE

## 2021-07-13 PROCEDURE — G8399 PT W/DXA RESULTS DOCUMENT: HCPCS | Performed by: FAMILY MEDICINE

## 2021-07-13 PROCEDURE — 81003 URINALYSIS AUTO W/O SCOPE: CPT | Performed by: FAMILY MEDICINE

## 2021-07-13 PROCEDURE — G0439 PPPS, SUBSEQ VISIT: HCPCS | Performed by: FAMILY MEDICINE

## 2021-07-13 RX ORDER — PRENATAL VIT 91/IRON/FOLIC/DHA 28-975-200
COMBINATION PACKAGE (EA) ORAL
Qty: 30 G | Refills: 0 | COMMUNITY
Start: 2021-07-13

## 2021-07-13 RX ORDER — TRIAMCINOLONE ACETONIDE 1 MG/G
CREAM TOPICAL
Qty: 15 G | Refills: 0 | Status: SHIPPED | OUTPATIENT
Start: 2021-07-13 | End: 2021-10-07

## 2021-07-13 NOTE — PROGRESS NOTES
HISTORY OF PRESENT ILLNESS  Kalen Sungw is a 78 y.o. female. HPI   Follow up on chronic medical problems. Overall has been feeling well. Doing the precautionary measures at home to reduce risks of exposure COVID19. She has been more anxious related to her family(her son and his wife and 2 kids) that lives with her being in and out out the home. Pt is wearing mask when she is going out. No known sick contacts or known exposure to [employer]. Cardiovascular Review:  The patient has hypertension and hyperlipidemia. Diet and Lifestyle: generally follows a low fat low cholesterol diet, generally follows a low sodium diet, follows a diabetic diet regularly, exercises sporadically  Home BP Monitoring: is not measured at home. Pertinent ROS: taking medications as instructed, no medication side effects noted, no TIA's, no chest pain on exertion, no dyspnea on exertion, slight swelling of ankles. DM type II follow up:  Diet controlled. Compliant w/ diabetic diet, and exercise. Has not been cheking BS recently. Denies any tingling sensation, polyuria and polydipsia. No blurred vision. No significant weight changes. Feeling well since last OV. Depression Review:  Patient is seen for followup of depression. Treatment includes Celexa. Ongoing symptoms include fatigue and intermittent anxiety. She denies depressed mood, insomnia and hopelessness. She experiences the following side effects from the treatment: none.     Patient Active Problem List   Diagnosis Code    Environmental allergies Z91.09    Depression F32.9    C section     H/O: hysterectomy Z90.710    Hypovitaminosis D E55.9    Mixed hyperlipidemia E78.2    Benign essential hematuria N02.9    Diet-controlled diabetes mellitus ([facility] [address] E11.9    Hypertensive retinopathy H35.039    Encounter for medication monitoring Z51.81    At high risk for breast cancer Z91.89    Obesity, morbid ([address] E66.01    FH: colon cancer Z80.0    Type 2 diabetes mellitus with diabetic neuropathy (HCC) E11.40       Current Outpatient Medications   Medication Sig Dispense Refill    fluticasone propionate (FLONASE) 50 mcg/actuation nasal spray SPRAY TWO SPRAYS IN EACH NOSTRIL ONCE DAILY AS NEEDED FOR ALLERGIES 1 Bottle 5    omeprazole (PRILOSEC) 40 mg capsule TAKE ONE CAPSULE BY MOUTH DAILY 90 Capsule 3    pravastatin (PRAVACHOL) 20 mg tablet TAKE TWO TABLETS BY MOUTH ONCE NIGHTLY 180 Tab 3    furosemide (LASIX) 20 mg tablet TAKE ONE TABLET BY MOUTH DAILY 90 Tab 1    losartan-hydroCHLOROthiazide (HYZAAR) 50-12.5 mg per tablet TAKE ONE TABLET BY MOUTH DAILY 90 Tab 3    OTHER [place] 1 tablet daily      OTHER Emergency --1 tablet twice a week      albuterol (ProAir HFA) 90 mcg/actuation inhaler Take 2 Puffs by inhalation every four (4) hours as needed for Wheezing or Shortness of Breath. Please instruct Pt on how to use an inhaler. 1 Inhaler 3    levocetirizine (XYZAL) 5 mg tablet TAKE ONE TABLET BY MOUTH DAILY 30 Tab 9    citalopram (CELEXA) 20 mg tablet TAKE ONE TABLET BY MOUTH DAILY 90 Tab 3    diclofenac (Voltaren) 1 % gel Apply topically to affected area every other night.  ketoconazole (NIZORAL) 2 % topical cream APPLY TO AFFECTED AREA(S) TWO TIMES A DAY AS NEEDED FOR SKIN IRRITATION OR ITCHING 15 g 1    loratadine (CLARITIN) 10 mg tablet Take 10 mg by mouth daily.  cholecalciferol (VITAMIN D3) 1,000 unit tablet Take 1,000 Units by mouth every seven (7) days.  cyanocobalamin (VITAMIN B-12) 1,000 mcg tablet Take 1,000 mcg by mouth daily.  ALPRAZolam (XANAX) 0.5 mg tablet TAKE ONE TABLET BY MOUTH AT NIGHTLY AS NEEDED 90 Tab 0    melatonin 3 mg tablet Take 3 mg by mouth nightly as needed.  acetaminophen (TYLENOL ARTHRITIS PAIN) 650 mg CR tablet Take 1,300 mg by mouth every six (6) hours as needed for Pain.  MULTIVITAMIN PO Take 1 Tab by mouth daily.  aspirin 81 mg chewable tablet Take 81 mg by mouth daily.  CALCIUM CARB/VIT D3/MINERALS (CALTRATE PLUS PO) Take 1 Tab by mouth daily. Allergies   Allergen Reactions    Ciprofloxacin Other (comments)     Insomnia and hot flashes    Demerol [Meperidine] Other (comments)     hallucinations    Doxycycline Unknown (comments)       \"Feeling flushed\"    Floxin [Ofloxacin] Other (comments)     insomnia    Lisinopril Cough    Phenergan-Codeine [Promethazine-Codeine] Itching     Patient stated she started itching when she was tapering off of medication.     Prednisone Other (comments)     Has feelings of depression, \"weird thoughts\"       Past Medical History:   Diagnosis Date    C section 3/12/2010    Depression 3/12/2010    Diabetes ([address]     Environmental allergies 3/12/2010    GERD (gastroesophageal reflux disease)     H/O: hysterectomy 3/12/2010    High cholesterol 3/12/2010    HTN (hypertension) 3/12/2010    Pneumonia 2017       Past Surgical History:   Procedure Laterality Date    COLONOSCOPY N/A 7/15/2020    COLONOSCOPY performed by Ricky Joy [facility] at [facility] ENDOSCOPY    ENDOSCOPY, COLON, DIAGNOSTIC      repeat 10 yrs (Dr. 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-     HX CYSTOSTOMY  3/2015    HX HYSTERECTOMY  1998       Family History   Problem Relation Age of Onset    Hypertension Mother     Other Mother         obesity, chf    Heart Disease Mother         CHF    Kidney Disease Mother     High Cholesterol Mother     Cancer Father         lung    Breast Cancer Sister     MS Sister     High Cholesterol Sister     Hypertension Sister     Cancer Sister         breast    Hypertension Brother     Liver Disease Brother         Hep C    High Cholesterol Brother     Cancer Brother         colon    Hypertension Sister     Diabetes Sister     High Cholesterol Sister  Hypertension Sister     Other Sister         obese    High Cholesterol Sister     Cancer Niece         cervical       Social History     Tobacco Use    Smoking status: Never Smoker    Smokeless tobacco: Never Used   Substance Use Topics    Alcohol use: Yes     Alcohol/week: 0.0 standard drinks     Comment: occasional        Lab Results   Component Value Date/Time    WBC 9.0 06/19/2020 12:27 PM    HGB 14.0 06/19/2020 12:27 PM    Hemoglobin (POC) 15.0 09/29/2016 05:26 PM    HCT 42.1 06/19/2020 12:27 PM    Hematocrit (POC) 44 09/29/2016 05:26 PM    PLATELET 948 82/04/6892 12:27 PM    MCV 88 06/19/2020 12:27 PM     Lab Results   Component Value Date/Time    Cholesterol, total 200 (H) 01/09/2021 08:58 AM    HDL Cholesterol 43 01/09/2021 08:58 AM    LDL, calculated 128 (H) 01/09/2021 08:58 AM    LDL, calculated 102 (H) 06/19/2020 12:27 PM    Triglyceride 162 (H) 01/09/2021 08:58 AM    CHOL/HDL Ratio 4.9 09/13/2010 11:06 AM     Lab Results   Component Value Date/Time    TSH 2.450 06/04/2019 01:29 PM      Lab Results   Component Value Date/Time    Sodium 143 01/09/2021 08:58 AM    Potassium 4.1 01/09/2021 08:58 AM    Chloride 104 01/09/2021 08:58 AM    CO2 25 01/09/2021 08:58 AM    Anion gap 5 03/11/2015 01:35 PM    Glucose 115 (H) 01/09/2021 08:58 AM    BUN 13 01/09/2021 08:58 AM    Creatinine 0.85 01/09/2021 08:58 AM    BUN/Creatinine ratio 15 01/09/2021 08:58 AM    GFR est AA 83 01/09/2021 08:58 AM    GFR est non-AA 72 01/09/2021 08:58 AM    Calcium 9.5 01/09/2021 08:58 AM    Bilirubin, total 0.3 01/09/2021 08:58 AM    ALT (SGPT) 13 01/09/2021 08:58 AM    Alk.  phosphatase 74 01/09/2021 08:58 AM    Protein, total 6.4 01/09/2021 08:58 AM    Albumin 4.0 01/09/2021 08:58 AM    Globulin 3.5 03/11/2015 01:35 PM    A-G Ratio 1.7 01/09/2021 08:58 AM      Lab Results   Component Value Date/Time    Hemoglobin A1c 5.9 (H) 01/09/2021 08:58 AM    Hemoglobin A1c (POC) 5.9 12/13/2019 08:19 AM         Review of Systems Constitutional: Negative for malaise/fatigue. Weight is up by 10 pounds since she stopped the topamax but she want to work on the diet and improved on the exercise. HENT: Negative for congestion. Eyes: Negative for blurred vision. Respiratory: Negative for cough and shortness of breath. Cardiovascular: Negative for chest pain, palpitations and leg swelling. Gastrointestinal: Negative for abdominal pain, blood in stool, constipation, heartburn and melena. Has fullness in the right lower abd. Feels that the \"right side is larger than the left side\". No pain noted but feels bloated. S/p hysterectomy but not sure if she has her ovaries or not. Genitourinary: Negative for dysuria, frequency and urgency. Musculoskeletal: Negative for back pain and joint pain. Skin: Positive for rash (on the front of the neck over the past few days. has itching. also has rash in the groin fold of the upper thighs.  ). Neurological: Negative for dizziness, tingling and headaches. Endo/Heme/Allergies: Negative for environmental allergies. Psychiatric/Behavioral: Negative for depression. The patient does not have insomnia. Physical Exam  Vitals and nursing note reviewed. Constitutional:       Appearance: Normal appearance. She is well-developed. Comments: /68 (BP 1 Location: Left arm, BP Patient Position: Sitting)   Pulse 70   Temp 98.9 °F (37.2 °C) (Oral)   Resp 18   Ht 5' 5\" (1.651 m)   Wt 261 lb (118.4 kg)   LMP 11/19/1998   SpO2 97%   BMI 43.43 kg/m²    HENT:      Right Ear: Tympanic membrane and ear canal normal.      Left Ear: Tympanic membrane and ear canal normal.      Nose: No mucosal edema. Neck:      Thyroid: No thyromegaly. Vascular: No carotid bruit. Cardiovascular:      Rate and Rhythm: Normal rate and regular rhythm. Pulses: Normal pulses. Heart sounds: Normal heart sounds. No gallop.     Pulmonary:      Effort: Pulmonary effort is normal.      Breath sounds: Normal breath sounds. Chest:      Breasts:         Right: Normal.         Left: Normal.   Abdominal:      General: Bowel sounds are normal.      Palpations: Abdomen is soft. There is no mass. Tenderness: There is no abdominal tenderness. Comments: Has some slight fullness and asymmetry with fullness on the right lower abd. Genitourinary:     Rectum: Guaiac result negative. Musculoskeletal:         General: No swelling. Normal range of motion. Cervical back: Normal range of motion and neck supple. Right lower leg: No edema. Left lower leg: No edema. Lymphadenopathy:      Cervical: No cervical adenopathy. Upper Body:      Right upper body: No supraclavicular, axillary or pectoral adenopathy. Left upper body: No supraclavicular, axillary or pectoral adenopathy. Skin:     General: Skin is warm and dry. Comments: Raised area with erythema rash on the anterior mid neck. Neurological:      General: No focal deficit present. Mental Status: She is alert and oriented to person, place, and time. Psychiatric:         Mood and Affect: Mood normal.         ASSESSMENT and PLAN  Diagnoses and all orders for this visit:    1. Medicare annual wellness visit, subsequent    2. Essential hypertension  Discussed sodium restriction, high k rich diet, maintaining ideal body weight and regular exercise program such as daily walking 30 min perday 4-5 times per week, as physiologic means to achieve blood pressure control. Medication compliance advised. 3. Diet-controlled diabetes mellitus ([facility] [address]  Continue to monitor. Work on diet and exercise. -     AMB POC HEMOGLOBIN A1C  -     AMB POC GLUCOSE, QUANTITATIVE, BLOOD  -     MICROALBUMIN, UR, RAND W/ MICROALB/CREAT RATIO; Future  -     AMB POC URINALYSIS DIP STICK AUTO W/O MICRO    4. Mixed hyperlipidemia  Continue to monitor. Work on diet and exercise.  -     LIPID PANEL; Future    5.  Mild depression ([facility] [address]  Stable     6. Abdominal fullness in right lower quadrant  -     US ABD COMP; Future  -     US PELV NON OBS; Future    7. Allergic dermatitis  -     triamcinolone acetonide (KENALOG) 0.1 % topical cream; Apply  to affected area two (2) times daily as needed for Skin Irritation. use thin layer to rash on her neck. 8. Tinea cruris  OTC lamisil cream twice daily as needed. Also discussed keeping clean and dry. 9. Encounter for medication monitoring  -     METABOLIC PANEL, COMPREHENSIVE; Future  -     CBC W/O DIFF; Future    10. Obesity, Class III, BMI 40-49.9 (morbid obesity) ([facility] [address]  I have reviewed/discussed the above normal BMI with the patient. I have recommended the following interventions: dietary management education, guidance, and counseling . Follow-up and Dispositions    · Return in about 4 months (around 11/13/2021). reviewed diet, exercise and weight control  cardiovascular risk and specific lipid/LDL goals reviewed  reviewed medications and side effects in detail  specific diabetic recommendations: low cholesterol diet, weight control and daily exercise discussed and glycohemoglobin and other lab monitoring discussed     I have discussed diagnosis listed in this note with pt and/or family. I have discussed treatment plans and options and the risk/benefit analysis of those options, including safe use of medications and possible medication side effects. Through the use of shared decision making we have agreed to the above plan. The patient has received an after-visit summary and questions were answered concerning future plans and follow up. Advise pt of any urgent changes then to proceed to the ER.

## 2021-07-13 NOTE — LETTER
CONTROLLED SUBSTANCE MEDICATION AGREEMENT  Patient Name: Leslie Franks  Patient YOB: 1955     I understand, that controlled substance medications may be used to help better manage my symptoms and to improve my ability to function at home, work and in social settings. However, I also understand that these medications do have risks, which have been discussed with me, including possible development of physical or psychological dependence. I understand that successful treatment requires mutual trust and honesty between me and my provider. I understand and agree that following this Medication Agreement is necessary in continuing my provider-patient relationship and the success of my treatment plan. Explanation from my Provider: Benefits and Goals of Controlled Substance Medications: There are two potential goals for your treatment: (1) decreased pain and suffering (2) improved daily life functions. There are many possible treatments for your chronic condition(s). Alternatives such as physical therapy, yoga, massage, home daily exercise, meditation, relaxation techniques, injections, chiropractic manipulations, surgery, cognitive therapy, hypnosis and many medications that are not habit-forming may be used. Use of controlled substance medications may be helpful, but they are unlikely to resolve all symptoms or restore all function. Explanation from my Provider: Risks of Controlled Substance Medications:   Opioid pain medications: These medications can lead to problems such as addiction/dependence, sedation, lightheadedness/dizziness, memory issues, falls, constipation, nausea, or vomiting. They may also impair the ability to drive or operate machinery. Additionally, these medications may lower testosterone levels, leading to loss of bone strength, stamina and sex drive.   They may cause problems with breathing, sleep apnea and reduced coughing, which is especially dangerous for patients with lung disease. Overdose or dangerous interactions with alcohol and other medications may occur, leading to death. Hyperalgesia may develop, which means patients receiving opioids for the treatment of pain may become more sensitive to certain painful stimuli, and in some cases, experience pain from ordinarily non-painful stimuli. Women between the ages of 14-53 who could become pregnant should carefully weigh the risks and benefits of opioids with their physicians, as these medications increase the risk of pregnancy complications, including miscarriage,  delivery and stillbirth. It is also possible for babies to be born addicted to opioids. Opioid dependence withdrawal symptoms may include; feelings of uneasiness, increased pain, irritability, belly pain, diarrhea, sweats and goose-flesh. Testosterone replacement therapy:  Potential side effects include increased risk of stroke and heart attack, blood clots, increased blood pressure, increased cholesterol, enlarged prostate, sleep apnea, irritability/aggression and other mood disorders, and decreased fertility. Jj Friedman (1955)             Page 1 of 4    Initials:_______    Benzodiazepines and non-benzodiazepine sleep medications: These medications can lead to problems such as addiction/dependence, sedation, fatigue, lightheadedness, dizziness, incoordination, falls, depression, hallucinations, and impaired judgment, memory and concentration. The ability to drive and operate machinery may also be affected. Abnormal sleep-related behaviors have been reported, including sleepwalking, driving, making telephone calls, eating, or having sex while not fully awake. These medications can suppress breathing and worsen sleep apnea, particularly when combined with alcohol or other sedating medications, potentially leading to death. Dependence withdrawal symptoms may include tremors, anxiety, hallucinations and seizures.    Stimulants:  Common adverse effects include addiction/dependence, increased blood pressure and heart rate, decreased appetite, nausea, involuntary weight loss, insomnia,  irritability, and headaches. These risks may increase when these medications are combined with other stimulants, such as caffeine pills or energy drinks, certain weight loss supplements and oral decongestants. Dependence withdrawal symptoms may include depressed mood, loss of interest, suicidal thoughts, anxiety, fatigue, appetite changes and agitation. I agree and understand that I and my prescriber have the following rights and responsibilities regarding my treatment plan:   1. MY RIGHTS:  To be informed of my treatment and medication plan. To be an active participant in my health and wellbeing. 2. MY RESPONSIBILITY AND UNDERSTANDING FOR USE OF MEDICATIONS   I will take medications at the dose and frequency as directed. For my safety, I will not increase or change how I take my medications without the recommendation of my healthcare provider.  I will actively participate in any program recommended by my provider which may improve function, including social, physical, psychological programs.  I will not take my medications with alcohol or other drugs not prescribed to me. I understand that drinking alcohol with my medications increases the chances of side effects, including reduced breathing rate and could lead to personal injury when operating machinery.  I understand that if I have a history of substance use disorders, including alcohol or other illicit drugs, that I may be at increased risk of addiction to my medications.  I agree to notify my provider immediately if I should become pregnant so that my treatment plan can be adjusted.    I agree and understand that I shall only receive controlled substance medications from the prescriber that signed this agreement unless there is written agreement among other prescribers of controlled substances outlining the responsibility of the medications being prescribed.  I understand that the if the controlled medication is not helping to achieve goals, the dosage may be tapered and no longer prescribed. 3. MY RESPONSIBILITY FOR COMMUNICATION / PRESCRIPTION RENEWALS   I agree that all controlled substance medications that I take will be prescribed only by my provider. If another healthcare provider prescribes me medication in an emergency, I will notify my provider within seventy-two (72) hours. Cristiano Hilton (1955)             Page 2 of 4    Initials:_______  [email or web address] I will arrange for refills at the prescribed interval ONLY during regular office hours. I will not ask for refills earlier than agreed, after-hours, on holidays or weekends. Refills may take up to 72 hours for processing and prescriptions to reach the pharmacy.  I will inform my other health care providers that I am taking these medications and of the existence of this [id number]. In the event of an emergency, I will provide the same information to the emergency department prescribers.  I will keep my provider updated on the pharmacy I am using for controlled medication prescription filling. 4. MY RESPONSIBILITY FOR PROTECTING MEDICATIONS   I will protect my prescriptions and medications. I understand that lost or misplaced prescriptions will not be replaced.  I will keep medications only for my own use and will not share them with others. I will keep all medications away from children.  I agree that if my medications are adjusted or discontinued, I will properly dispose of any remaining medications. I understand that I will be required to dispose of any remaining controlled medications as, directed by my prescriber, prior to being provided with any prescriptions for other controlled medications.   Medication drop box locations can be found at: [employer]  5. MY RESPONSIBILITY WITH ILLEGAL DRUGS    I will not use illegal or street drugs or another person's prescription medications not prescribed to me.  If there are identified addiction type symptoms, then referral to a program may be provided by my provider and I agree to follow through with this recommendation. 6. MY RESPONSIBILITY FOR COOPERATION WITH INVESTIGATIONS   I understand that my provider will comply with any applicable law and may discuss my use and/or possible misuse/abuse of controlled substances and alcohol, as appropriate, with any health care provider involved in my care, pharmacist, or legal authority.  I authorize my provider and pharmacy to cooperate fully with law enforcement agencies (as permitted by law) in the investigation of any possible misuse, sale, or other diversion of my controlled substances.  I agree to waive any applicable privilege or right of privacy or confidentiality with respect to these authorizations. 7. PROVIDERS RIGHT TO MONITOR FOR SAFETY: PRESCRIPTION MONITORING / DRUG TESTING   I consent to drug/toxicology screening and will submit to a drug screen upon my providers request to assure I am only taking the prescribed drugs for my safety monitoring. I understand that a drug screen is a laboratory test in which a sample of my urine, blood or saliva is checked to see what drugs I have been taking. This may entail an observed urine specimen, which means that a nurse or other health care provider may watch me provide urine, and I will cooperate if I am asked to provide an observed specimen. Beba Grider (1955)             Page 3 of 4    Initials:_______  Ofelia Raines I understand that my provider will check a copy of my State Prescription Monitoring Program () Report in order to safely prescribe medications.      Pill Counts: I consent to pill counts when requested. I may be asked to bring all my prescribed controlled substance medications, in their original bottles, to all of my scheduled appointments. In addition, my provider may ask me to come to the practice at any time for a random pill count. 8. TERMINATION OF THIS AGREEMENT   For my safety, my prescriber has the right to stop prescribing controlled substance medications and may end this agreement.  Conditions that may result in termination of this agreement:  a. I do not show any improvement in pain, or my activity has not improved. b. I develop rapid tolerance or loss of improvement, as described in my treatment plan.  c. I develop significant side effects from the medication. d. My behavior is not consistent with the responsibilities outlined above, thereby causing safety concerns to continue prescribing controlled substance medications. e. I fail to follow the terms of this agreement. f. Other:____________________________     UNDERSTANDING THIS MEDICATION AGREEMENT:    I have read the above and have had all my questions answered. For chronic disease management, I know that my symptoms can be managed with many types of treatments. A chronic medication trial may be part of my treatment, but I must be an active participant in my care. Medication therapy is only one part of my symptom management plan. In some cases, there may be limited scientific evidence to support the chronic use of certain medications to improve symptoms and daily function. Furthermore, in certain circumstances, there may be scientific information that suggests that the use of chronic controlled substances may worsen my symptoms and increase my risk of unintentional death directly related to this medication therapy. I know that if my provider feels my risk from controlled medications is greater than my benefit, I will have my controlled substance medication(s) compassionately lowered or removed altogether. I further agree to allow this office to contact my HIPAA contact if there are concerns about my safety and use of the controlled medications. I have agreed to use the prescribed controlled substance medications to me as instructed by my provider and as stated in this Medication Agreement. My initial on each page and my signature below shows that I have read each page and I have had the opportunity to ask questions with answers provided by my provider.       Patient Name (Printed): _____________________________________    Patient Signature:  ______________________   Date: _____________      Prescriber Name (Printed): ___________________________________    Prescriber Signature: _____________________  Date: _____________     [facility] (1955)             Page 4 of 4

## 2021-07-13 NOTE — PROGRESS NOTES
Chief Complaint   Patient presents with   [facility] Annual Wellness Visit     Medicare Wellness       Patient c/o vaginal itching. Patient denies any vaginal discharge. Microalbumin 6/4/2019    Mammogram 2/23/2021    Colonoscopy 7/15/2020 by Dr. Dorys Chaparro repeat in 4 years. Eye exam 10/12/2020 with Dr. Linda Sebastian. 1. Have you been to the ER, urgent care clinic since your last visit? Hospitalized since your last visit? No    2. Have you seen or consulted any other health care providers outside of the [address] [place] [address] since your last visit? Include any pap smears or colon screening.  No

## 2021-09-24 ENCOUNTER — TRANSCRIBE ORDER (OUTPATIENT)
Dept: SCHEDULING | Age: 66
End: 2021-09-24

## 2021-09-24 DIAGNOSIS — R19.8 ABDOMINAL FULLNESS IN RIGHT LOWER QUADRANT: Primary | ICD-10-CM

## 2021-10-01 ENCOUNTER — HOSPITAL ENCOUNTER (OUTPATIENT)
Dept: ULTRASOUND IMAGING | Age: 66
Discharge: HOME | End: 2021-10-01
Attending: FAMILY MEDICINE
Payer: MEDICARE

## 2021-10-01 DIAGNOSIS — R19.8 ABDOMINAL FULLNESS IN RIGHT LOWER QUADRANT: ICD-10-CM

## 2021-10-01 PROCEDURE — 76700 US EXAM ABDOM COMPLETE: CPT

## 2021-10-01 PROCEDURE — 76830 TRANSVAGINAL US NON-OB: CPT

## 2021-10-01 PROCEDURE — 76856 US EXAM PELVIC COMPLETE: CPT

## 2021-10-07 ENCOUNTER — TELEPHONE (OUTPATIENT)
Dept: FAMILY MEDICINE CLINIC | Age: 66
End: 2021-10-07

## 2021-10-07 DIAGNOSIS — L23.9 ALLERGIC DERMATITIS: ICD-10-CM

## 2021-10-07 RX ORDER — TRIAMCINOLONE ACETONIDE 1 MG/G
CREAM TOPICAL
Qty: 15 G | Refills: 0 | Status: SHIPPED | OUTPATIENT
Start: 2021-10-07 | End: 2022-09-02 | Stop reason: ALTCHOICE

## 2021-11-03 DIAGNOSIS — I10 ESSENTIAL HYPERTENSION: ICD-10-CM

## 2021-11-03 RX ORDER — FUROSEMIDE 20 MG/1
TABLET ORAL
Qty: 90 TABLET | Refills: 1 | Status: SHIPPED | OUTPATIENT
Start: 2021-11-03

## 2021-11-05 DIAGNOSIS — F32.A DEPRESSION, UNSPECIFIED DEPRESSION TYPE: ICD-10-CM

## 2021-11-05 RX ORDER — CITALOPRAM 20 MG/1
20 TABLET, FILM COATED ORAL DAILY
Qty: 90 TABLET | Refills: 3 | Status: SHIPPED | OUTPATIENT
Start: 2021-11-05

## 2022-01-18 DIAGNOSIS — Z91.09 ENVIRONMENTAL ALLERGIES: ICD-10-CM

## 2022-01-19 RX ORDER — LEVOCETIRIZINE DIHYDROCHLORIDE 5 MG/1
5 TABLET, FILM COATED ORAL DAILY
Qty: 90 TABLET | Refills: 3 | Status: SHIPPED | OUTPATIENT
Start: 2022-01-19 | End: 2022-09-02 | Stop reason: ALTCHOICE

## 2022-01-19 RX ORDER — ALBUTEROL SULFATE 90 UG/1
2 AEROSOL, METERED RESPIRATORY (INHALATION)
Qty: 1 EACH | Refills: 3 | Status: SHIPPED | OUTPATIENT
Start: 2022-01-19

## 2022-01-19 NOTE — TELEPHONE ENCOUNTER
Last OV: 7/13/21  Next Appt: none  Last Refill: 1/5/21    Requested Prescriptions     Pending Prescriptions Disp Refills    levocetirizine (XYZAL) 5 mg tablet 90 Tablet 3     Sig: Take 1 Tablet by mouth daily.  albuterol (ProAir HFA) 90 mcg/actuation inhaler 1 Each 3     Sig: Take 2 Puffs by inhalation every four (4) hours as needed for Wheezing or Shortness of Breath. Please instruct Pt on how to use an inhaler.

## 2022-01-28 ENCOUNTER — APPOINTMENT (OUTPATIENT)
Dept: GENERAL RADIOLOGY | Age: 67
End: 2022-01-28
Attending: EMERGENCY MEDICINE
Payer: MEDICARE

## 2022-01-28 ENCOUNTER — HOSPITAL ENCOUNTER (EMERGENCY)
Age: 67
Discharge: HOME | End: 2022-01-29
Attending: EMERGENCY MEDICINE
Payer: MEDICARE

## 2022-01-28 DIAGNOSIS — R07.89 ATYPICAL CHEST PAIN: Primary | ICD-10-CM

## 2022-01-28 DIAGNOSIS — R09.1 PLEURISY: ICD-10-CM

## 2022-01-28 LAB
COMMENT, HOLDF: NORMAL
SAMPLES BEING HELD,HOLD: NORMAL
TROPONIN-HIGH SENSITIVITY: 6 NG/L (ref 0–51)

## 2022-01-28 PROCEDURE — 96374 THER/PROPH/DIAG INJ IV PUSH: CPT

## 2022-01-28 PROCEDURE — 84484 ASSAY OF TROPONIN QUANT: CPT

## 2022-01-28 PROCEDURE — 99283 EMERGENCY DEPT VISIT LOW MDM: CPT

## 2022-01-28 PROCEDURE — 71046 X-RAY EXAM CHEST 2 VIEWS: CPT

## 2022-01-28 PROCEDURE — 93005 ELECTROCARDIOGRAM TRACING: CPT

## 2022-01-28 PROCEDURE — 74011250636 HC RX REV CODE- 250/636: Performed by: EMERGENCY MEDICINE

## 2022-01-28 PROCEDURE — 36415 COLL VENOUS BLD VENIPUNCTURE: CPT

## 2022-01-28 RX ORDER — KETOROLAC TROMETHAMINE 30 MG/ML
30 INJECTION, SOLUTION INTRAMUSCULAR; INTRAVENOUS
Status: COMPLETED | OUTPATIENT
Start: 2022-01-28 | End: 2022-01-28

## 2022-01-28 RX ADMIN — KETOROLAC TROMETHAMINE 30 MG: 30 INJECTION, SOLUTION INTRAMUSCULAR; INTRAVENOUS at 23:06

## 2022-01-29 ENCOUNTER — APPOINTMENT (OUTPATIENT)
Dept: CT IMAGING | Age: 67
End: 2022-01-29
Attending: EMERGENCY MEDICINE
Payer: MEDICARE

## 2022-01-29 VITALS
DIASTOLIC BLOOD PRESSURE: 89 MMHG | TEMPERATURE: 98.5 F | SYSTOLIC BLOOD PRESSURE: 127 MMHG | OXYGEN SATURATION: 99 % | WEIGHT: 260 LBS | RESPIRATION RATE: 16 BRPM | BODY MASS INDEX: 41.78 KG/M2 | HEIGHT: 66 IN | HEART RATE: 80 BPM

## 2022-01-29 LAB
ALBUMIN SERPL-MCNC: 3.9 G/DL (ref 3.5–5)
ALBUMIN/GLOB SERPL: 1.2 {RATIO} (ref 1.1–2.2)
ALP SERPL-CCNC: 77 U/L (ref 45–117)
ALT SERPL-CCNC: 20 U/L (ref 12–78)
ANION GAP SERPL CALC-SCNC: 7 MMOL/L (ref 5–15)
AST SERPL-CCNC: 12 U/L (ref 15–37)
ATRIAL RATE: 98 BPM
BASOPHILS # BLD: 0.1 K/UL (ref 0–0.1)
BASOPHILS NFR BLD: 1 % (ref 0–1)
BILIRUB SERPL-MCNC: 0.3 MG/DL (ref 0.2–1)
BNP SERPL-MCNC: 30 PG/ML
BUN SERPL-MCNC: 12 MG/DL (ref 6–20)
BUN/CREAT SERPL: 14 (ref 12–20)
CALCIUM SERPL-MCNC: 9.6 MG/DL (ref 8.5–10.1)
CALCULATED P AXIS, ECG09: 63 DEGREES
CALCULATED R AXIS, ECG10: 35 DEGREES
CALCULATED T AXIS, ECG11: 25 DEGREES
CHLORIDE SERPL-SCNC: 101 MMOL/L (ref 97–108)
CK SERPL-CCNC: 106 U/L (ref 26–192)
CO2 SERPL-SCNC: 29 MMOL/L (ref 21–32)
COMMENT, HOLDF: NORMAL
CREAT SERPL-MCNC: 0.85 MG/DL (ref 0.55–1.02)
D DIMER PPP FEU-MCNC: 2.35 MG/L FEU (ref 0–0.65)
DIAGNOSIS, 93000: NORMAL
DIFFERENTIAL METHOD BLD: ABNORMAL
EOSINOPHIL # BLD: 0.6 K/UL (ref 0–0.4)
EOSINOPHIL NFR BLD: 4 % (ref 0–7)
ERYTHROCYTE [DISTWIDTH] IN BLOOD BY AUTOMATED COUNT: 13.7 % (ref 11.5–14.5)
GLOBULIN SER CALC-MCNC: 3.3 G/DL (ref 2–4)
GLUCOSE SERPL-MCNC: 115 MG/DL (ref 65–100)
HCT VFR BLD AUTO: 43.6 % (ref 35–47)
HGB BLD-MCNC: 14.1 G/DL (ref 11.5–16)
IMM GRANULOCYTES # BLD AUTO: 0.1 K/UL (ref 0–0.04)
IMM GRANULOCYTES NFR BLD AUTO: 0 % (ref 0–0.5)
LYMPHOCYTES # BLD: 2.9 K/UL (ref 0.8–3.5)
LYMPHOCYTES NFR BLD: 20 % (ref 12–49)
MAGNESIUM SERPL-MCNC: 2.3 MG/DL (ref 1.6–2.4)
MCH RBC QN AUTO: 29.4 PG (ref 26–34)
MCHC RBC AUTO-ENTMCNC: 32.3 G/DL (ref 30–36.5)
MCV RBC AUTO: 90.8 FL (ref 80–99)
MONOCYTES # BLD: 0.9 K/UL (ref 0–1)
MONOCYTES NFR BLD: 6 % (ref 5–13)
NEUTS SEG # BLD: 9.7 K/UL (ref 1.8–8)
NEUTS SEG NFR BLD: 69 % (ref 32–75)
NRBC # BLD: 0 K/UL (ref 0–0.01)
NRBC BLD-RTO: 0 PER 100 WBC
P-R INTERVAL, ECG05: 148 MS
PHOSPHATE SERPL-MCNC: 3.2 MG/DL (ref 2.6–4.7)
PLATELET # BLD AUTO: 332 K/UL (ref 150–400)
PMV BLD AUTO: 10.5 FL (ref 8.9–12.9)
POTASSIUM SERPL-SCNC: 3.8 MMOL/L (ref 3.5–5.1)
PROT SERPL-MCNC: 7.2 G/DL (ref 6.4–8.2)
Q-T INTERVAL, ECG07: 336 MS
QRS DURATION, ECG06: 80 MS
QTC CALCULATION (BEZET), ECG08: 428 MS
RBC # BLD AUTO: 4.8 M/UL (ref 3.8–5.2)
SAMPLES BEING HELD,HOLD: NORMAL
SODIUM SERPL-SCNC: 137 MMOL/L (ref 136–145)
VENTRICULAR RATE, ECG03: 98 BPM
WBC # BLD AUTO: 14.3 K/UL (ref 3.6–11)

## 2022-01-29 PROCEDURE — 36415 COLL VENOUS BLD VENIPUNCTURE: CPT

## 2022-01-29 PROCEDURE — 83735 ASSAY OF MAGNESIUM: CPT

## 2022-01-29 PROCEDURE — 85379 FIBRIN DEGRADATION QUANT: CPT

## 2022-01-29 PROCEDURE — 84100 ASSAY OF PHOSPHORUS: CPT

## 2022-01-29 PROCEDURE — 82550 ASSAY OF CK (CPK): CPT

## 2022-01-29 PROCEDURE — 74011000636 HC RX REV CODE- 636: Performed by: RADIOLOGY

## 2022-01-29 PROCEDURE — 80053 COMPREHEN METABOLIC PANEL: CPT

## 2022-01-29 PROCEDURE — 83880 ASSAY OF NATRIURETIC PEPTIDE: CPT

## 2022-01-29 PROCEDURE — 85025 COMPLETE CBC W/AUTO DIFF WBC: CPT

## 2022-01-29 PROCEDURE — 71275 CT ANGIOGRAPHY CHEST: CPT

## 2022-01-29 RX ORDER — NAPROXEN 500 MG/1
500 TABLET ORAL 2 TIMES DAILY WITH MEALS
Qty: 30 TABLET | Refills: 0 | Status: SHIPPED | OUTPATIENT
Start: 2022-01-29 | End: 2022-02-15 | Stop reason: ALTCHOICE

## 2022-01-29 RX ADMIN — IOPAMIDOL 80 ML: 755 INJECTION, SOLUTION INTRAVENOUS at 04:27

## 2022-01-29 NOTE — ED PROVIDER NOTES
39-year-old female with a past medical history significant for , depression, diabetes, GERD, hysterectomy, hypercholesterolemia, hypertension, recent diagnosis of Covid pneumonia 2 weeks ago who presents to the ED with a complaint of intermittent episodes of sharp stabbing chest pain x2 days described as dull throbbing nature, severity 5 out of 10, worse with deep breaths and movement, accompanied by dry cough, congestion. The patient denies any neck or back pain, sore throat, headache, fever, nausea, vomiting, diarrhea, constipation, dysuria, dizziness, extremity weakness or numbness, sick contact, skin rash or recent travel.            Past Medical History:   Diagnosis Date    C section 3/12/2010    Depression 3/12/2010    Diabetes ([facility] [address]     Environmental allergies 3/12/2010    GERD (gastroesophageal reflux disease)     H/O: hysterectomy 3/12/2010    High cholesterol 3/12/2010    HTN (hypertension) 3/12/2010    Pneumonia 2017       Past Surgical History:   Procedure Laterality Date    COLONOSCOPY N/A 7/15/2020    [facility]COPY    ENDOSCOPY, COLON, DIAGNOSTIC  2006    repeat 10 yrs (Dr. Kesha Mir)    HX BREAST BIOPSY  1980    bilateral negative    HX BREAST BIOPSY  1986    left negative    HX  SECTION      x 3    HX CORNEAL TRANSPLANT  2011    left eye-     HX CYSTOSTOMY  3/2015    HX HYSTERECTOMY  1998         Family History:   Problem Relation Age of Onset    Hypertension Mother     Other Mother         obesity, chf    Heart Disease Mother         CHF    Kidney Disease Mother     High Cholesterol Mother     Cancer Father         lung    Breast Cancer Sister     Mult Sclerosis Sister     High Cholesterol Sister     Hypertension Sister     Cancer Sister         breast    Hypertension Brother     Liver Disease Brother         Hep C    High Cholesterol Brother     Cancer Brother         colon  Hypertension Sister     Diabetes Sister     High Cholesterol Sister     Hypertension Sister     Other Sister         obese    High Cholesterol Sister     Cancer Niece         cervical       Social History     Socioeconomic History    Marital status:      Spouse name: Not on file    Number of children: Not on file    Years of education: Not on file    Highest education level: Not on file   Occupational History    Not on file   Tobacco Use    Smoking status: Never Smoker    Smokeless tobacco: Never Used   Vaping Use    Vaping Use: Never used   Substance and Sexual Activity    Alcohol use: Yes     Alcohol/week: 0.0 standard drinks     Comment: occasional    Drug use: No    Sexual activity: Yes     Partners: Male     Birth control/protection: Surgical   Other Topics Concern    Not on file   Social History Narrative    Not on file     Social Determinants of Health     Financial Resource Strain:     Difficulty of Paying Living Expenses: Not on file   Food Insecurity:     Worried About Running Out of Food in the Last Year: Not on file    Avani of Food in the Last Year: Not on file   Transportation Needs:     Lack of Transportation (Medical): Not on file    Lack of Transportation (Non-Medical):  Not on file   Physical Activity:     Days of Exercise per Week: Not on file    Minutes of Exercise per Session: Not on file   Stress:     Feeling of Stress : Not on file   Social Connections:     Frequency of Communication with Friends and Family: Not on file    Frequency of Social Gatherings with Friends and Family: Not on file    Attends Temple Services: Not on file    Active Member of Clubs or Organizations: Not on file    Attends Club or Organization Meetings: Not on file    Marital Status: Not on file   Intimate Partner Violence:     Fear of Current or Ex-Partner: Not on file    Emotionally Abused: Not on file    Physically Abused: Not on file    Sexually Abused: Not on file Housing Stability:     Unable to Pay for Housing in the Last Year: Not on file    Number of Places Lived in the Last Year: Not on file    Unstable Housing in the Last Year: Not on file         ALLERGIES: Ciprofloxacin, Demerol [meperidine], Doxycycline, Floxin [ofloxacin], Lisinopril, Phenergan-codeine [promethazine-codeine], and Prednisone    Review of Systems   All other systems reviewed and are negative. Vitals:    01/28/22 1931 01/29/22 0506   BP: (!) 158/97 127/89   Pulse: 81 80   Resp: 18 16   Temp: 98.5 °F (36.9 °C)    SpO2: 97% 99%   Weight: 117.9 kg (260 lb)    Height: 5' 6\" (1.676 m)             Physical Exam  Vitals and nursing note reviewed. CONSTITUTIONAL: Well-appearing; well-nourished; in no apparent distress  HEAD: Normocephalic; atraumatic  EYES: PERRL; EOM intact; conjunctiva and sclera are clear bilaterally. ENT: No rhinorrhea; normal pharynx with no tonsillar hypertrophy; mucous membranes pink/moist, no erythema, no exudate. NECK: Supple; non-tender; no cervical lymphadenopathy  CARD: Normal S1, S2; no murmurs, rubs, or gallops. Regular rate and rhythm. RESP: Normal respiratory effort; breath sounds clear and equal bilaterally; no wheezes, rhonchi, or rales. ABD: Normal bowel sounds; non-distended; non-tender; no palpable organomegaly, no masses, no bruits. Back Exam: Normal inspection; no vertebral point tenderness, no CVA tenderness. Normal range of motion. EXT: Normal ROM in all four extremities; non-tender to palpation; no swelling or deformity; distal pulses are normal, no edema. SKIN: Warm; dry; no rash. NEURO:Alert and oriented x 3, coherent, GILL-XII grossly intact, sensory and motor are non-focal.        MDM  Number of Diagnoses or Management Options  Atypical chest pain  Pleurisy  Diagnosis management comments: Assessment: 57-year-old female with atypical chest pain rule out ACS, VTE, pneumonia including COVID-19 infection, pleurisy and pneumothorax. .      Plan: EKG/chest x-ray/lab/IV fluid/Toradol/CTA of the chest/education, reassurance, symptomatic treatment/ Monitor and Reevaluate. Amount and/or Complexity of Data Reviewed  Clinical lab tests: ordered and reviewed  Tests in the radiology section of CPT®: ordered and reviewed  Tests in the medicine section of CPT®: reviewed and ordered  Discussion of test results with the performing providers: yes  Decide to obtain previous medical records or to obtain history from someone other than the patient: yes  Obtain history from someone other than the patient: yes  Review and summarize past medical records: yes  Discuss the patient with other providers: yes  Independent visualization of images, tracings, or specimens: yes    Risk of Complications, Morbidity, and/or Mortality  Presenting problems: moderate  Diagnostic procedures: moderate  Management options: moderate      ED Course as of 01/29/22 0626 Fri Jan 28, 2022 2005 ED EKG interpretation:  Rhythm: normal sinus rhythm; and regular . Rate (approx.): 98; Axis: normal; ST/T wave: normal; No evidence of acute coronary ischemia. [AL]      ED Course User Index  [AL] Blue Edgar MD       Procedures  ED EKG interpretation:  Rhythm: normal sinus rhythm; and regular . Rate (approx.): 80; Axis: normal; P wave: normal; QRS interval: normal ; ST/T wave: non-specific changes; in  Lead: Diffusely; Other findings: abnormal ekg. This EKG was interpreted by Fredi Marquez MD,ED Provider. XRAY INTERPRETATION (ED MD)  Chest Xray  No acute process seen. Normal heart size. No bony abnormalities. No infiltrate. No att. providers found 6:28 AM    Progress Note:   Pt has been reexamined by Fredi Marquez MD. Pt is feeling much better. Symptoms have improved. All available results have been reviewed with pt and any available family. Pt understands sx, dx, and tx in ED. Care plan has been outlined and questions have been answered. Pt is ready to go home.  Will send home on chest pain and pleurisy instruction. Prescription of naproxen. . Outpatient referral with PCP as needed. Written by Abdirahman Alvarado MD,6:28 AM    .   .

## 2022-01-29 NOTE — ED TRIAGE NOTES
Pt arrives from home c/o Chest pain of 8/10 X 1 day. Reports HA, denies n/v/d. Pt was seen at [facility] where she had an EKG and was told to come to the ER. Pt tested positive for COVID on 1/4.

## 2022-02-15 ENCOUNTER — OFFICE VISIT (OUTPATIENT)
Dept: FAMILY MEDICINE CLINIC | Age: 67
End: 2022-02-15
Payer: MEDICARE

## 2022-02-15 VITALS
DIASTOLIC BLOOD PRESSURE: 72 MMHG | SYSTOLIC BLOOD PRESSURE: 135 MMHG | BODY MASS INDEX: 42.07 KG/M2 | WEIGHT: 261.8 LBS | TEMPERATURE: 98.4 F | RESPIRATION RATE: 18 BRPM | OXYGEN SATURATION: 95 % | HEART RATE: 68 BPM | HEIGHT: 66 IN

## 2022-02-15 DIAGNOSIS — E66.01 OBESITY, CLASS III, BMI 40-49.9 (MORBID OBESITY) (HCC): ICD-10-CM

## 2022-02-15 DIAGNOSIS — R31.21 ASYMPTOMATIC MICROSCOPIC HEMATURIA: ICD-10-CM

## 2022-02-15 DIAGNOSIS — Z51.81 ENCOUNTER FOR MEDICATION MONITORING: ICD-10-CM

## 2022-02-15 DIAGNOSIS — F32.A MILD DEPRESSION: ICD-10-CM

## 2022-02-15 DIAGNOSIS — E11.9 DIET-CONTROLLED DIABETES MELLITUS (HCC): ICD-10-CM

## 2022-02-15 DIAGNOSIS — Z01.89 NEEDS SLEEP APNEA ASSESSMENT: ICD-10-CM

## 2022-02-15 DIAGNOSIS — E78.2 MIXED HYPERLIPIDEMIA: ICD-10-CM

## 2022-02-15 DIAGNOSIS — I10 ESSENTIAL HYPERTENSION: Primary | ICD-10-CM

## 2022-02-15 LAB
GLUCOSE POC: 132 MG/DL
HBA1C MFR BLD HPLC: 6.3 %

## 2022-02-15 PROCEDURE — G8399 PT W/DXA RESULTS DOCUMENT: HCPCS | Performed by: FAMILY MEDICINE

## 2022-02-15 PROCEDURE — 1090F PRES/ABSN URINE INCON ASSESS: CPT | Performed by: FAMILY MEDICINE

## 2022-02-15 PROCEDURE — G0463 HOSPITAL OUTPT CLINIC VISIT: HCPCS | Performed by: FAMILY MEDICINE

## 2022-02-15 PROCEDURE — 3017F COLORECTAL CA SCREEN DOC REV: CPT | Performed by: FAMILY MEDICINE

## 2022-02-15 PROCEDURE — G8536 NO DOC ELDER MAL SCRN: HCPCS | Performed by: FAMILY MEDICINE

## 2022-02-15 PROCEDURE — 99214 OFFICE O/P EST MOD 30 MIN: CPT | Performed by: FAMILY MEDICINE

## 2022-02-15 PROCEDURE — 82947 ASSAY GLUCOSE BLOOD QUANT: CPT | Performed by: FAMILY MEDICINE

## 2022-02-15 PROCEDURE — G9899 SCRN MAM PERF RSLTS DOC: HCPCS | Performed by: FAMILY MEDICINE

## 2022-02-15 PROCEDURE — G9717 DOC PT DX DEP/BP F/U NT REQ: HCPCS | Performed by: FAMILY MEDICINE

## 2022-02-15 PROCEDURE — 1101F PT FALLS ASSESS-DOCD LE1/YR: CPT | Performed by: FAMILY MEDICINE

## 2022-02-15 PROCEDURE — G8417 CALC BMI ABV UP PARAM F/U: HCPCS | Performed by: FAMILY MEDICINE

## 2022-02-15 PROCEDURE — 83036 HEMOGLOBIN GLYCOSYLATED A1C: CPT | Performed by: FAMILY MEDICINE

## 2022-02-15 PROCEDURE — 2022F DILAT RTA XM EVC RTNOPTHY: CPT | Performed by: FAMILY MEDICINE

## 2022-02-15 PROCEDURE — G8752 SYS BP LESS 140: HCPCS | Performed by: FAMILY MEDICINE

## 2022-02-15 PROCEDURE — G8427 DOCREV CUR MEDS BY ELIG CLIN: HCPCS | Performed by: FAMILY MEDICINE

## 2022-02-15 PROCEDURE — G8754 DIAS BP LESS 90: HCPCS | Performed by: FAMILY MEDICINE

## 2022-02-15 PROCEDURE — 3044F HG A1C LEVEL LT 7.0%: CPT | Performed by: FAMILY MEDICINE

## 2022-02-15 NOTE — PROGRESS NOTES
HISTORY OF PRESENT ILLNESS  Hina Leahy is a 78 y.o. female. HPI   Follow up on chronic medical problems. Overall has been feeling well. Doing the precautionary measures at home to reduce risks of exposure COVID19. She has been more anxious related to her family(her son and his wife and 2 kids) that lives with her being in and out out the home. Pt is wearing mask when she is going out. No known sick contacts or known exposure to Ignacio Pang. Feeling short winded since having COVID19 on January 6th. Had headache and chest congestion and coughing. Had ER visit d/t persistent cough and SOB on 1/28/22 and eval was WNL. Overall SOB is getting better. Cardiovascular Review:  The patient has hypertension and hyperlipidemia. Diet and Lifestyle: generally follows a low fat low cholesterol diet, generally follows a low sodium diet, follows a diabetic diet regularly, exercises sporadically  Home BP Monitoring: is not measured at home. Pertinent ROS: taking medications as instructed, no medication side effects noted, no TIA's, no chest pain on exertion, no dyspnea on exertion, slight swelling of ankles. DM type II follow up:  Diet controlled. Compliant w/ diabetic diet, and exercise. Has not been checking BS recently. Denies any tingling sensation, polyuria and polydipsia. No blurred vision. No significant weight changes. Feeling well since last OV. Depression Review:  Patient is seen for followup of depression. Treatment includes Celexa. Ongoing symptoms include fatigue and intermittent anxiety. She denies depressed mood, insomnia and hopelessness. She experiences the following side effects from the treatment: none. Patient Active Problem List   Diagnosis Code    Environmental allergies Z91.09    Depression F32. A    C section     H/O: hysterectomy Z90.710    Hypovitaminosis D E55.9    Mixed hyperlipidemia E78.2    Benign essential hematuria N02.9    Diet-controlled diabetes mellitus ([facility] [address] E11.9    Hypertensive retinopathy H35.039    Encounter for medication monitoring Z51.81    At high risk for breast cancer Z91.89    Obesity, morbid (HCC) E66.01    FH: colon cancer Z80.0    Type 2 diabetes mellitus with diabetic neuropathy (HCC) E11.40       Current Outpatient Medications   Medication Sig Dispense Refill    naproxen (NAPROSYN) 500 mg tablet Take 1 Tablet by mouth two (2) times daily (with meals). 30 Tablet 0    levocetirizine (XYZAL) 5 mg tablet Take 1 Tablet by mouth daily. 90 Tablet 3    albuterol (ProAir HFA) 90 mcg/actuation inhaler Take 2 Puffs by inhalation every four (4) hours as needed for Wheezing or Shortness of Breath. Please instruct Pt on how to use an inhaler. 1 Each 3    citalopram (CELEXA) 20 mg tablet Take 1 Tablet by mouth daily. 90 Tablet 3    furosemide (LASIX) 20 mg tablet TAKE ONE TABLET BY MOUTH DAILY 90 Tablet 1    triamcinolone acetonide (KENALOG) 0.1 % topical cream APPLY TO AFFECTED AREA(S) TWO TIMES A DAY AS NEEDED FOR SKIN IRRITATION, USE A THIN LAYER TO RASH ON HER NECK 15 g 0    terbinafine HCL (LAMISIL) 1 % topical cream Apply  to affected area two (2) times daily as needed. 30 g 0    fluticasone propionate (FLONASE) 50 mcg/actuation nasal spray SPRAY TWO SPRAYS IN EACH NOSTRIL ONCE DAILY AS NEEDED FOR ALLERGIES 1 Bottle 5    omeprazole (PRILOSEC) 40 mg capsule TAKE ONE CAPSULE BY MOUTH DAILY 90 Capsule 3    pravastatin (PRAVACHOL) 20 mg tablet TAKE TWO TABLETS BY MOUTH ONCE NIGHTLY 180 Tab 3    losartan-hydroCHLOROthiazide (HYZAAR) 50-12.5 mg per tablet TAKE ONE TABLET BY MOUTH DAILY 90 Tab 3    OTHER Eldeberry 1 tablet daily      OTHER Emergency --1 tablet twice a week      diclofenac (Voltaren) 1 % gel Apply topically to affected area daily as needed      loratadine (CLARITIN) 10 mg tablet Take 10 mg by mouth daily.  cholecalciferol (VITAMIN D3) 1,000 unit tablet Take 1,000 Units by mouth daily.       cyanocobalamin (VITAMIN B-12) 1,000 mcg tablet Take 1,000 mcg by mouth daily.  ALPRAZolam (XANAX) 0.5 mg tablet TAKE ONE TABLET BY MOUTH AT NIGHTLY AS NEEDED 90 Tab 0    melatonin 3 mg tablet Take 3 mg by mouth nightly as needed.  acetaminophen (TYLENOL ARTHRITIS PAIN) 650 mg CR tablet Take 1,300 mg by mouth every six (6) hours as needed for Pain.  aspirin 81 mg chewable tablet Take 81 mg by mouth daily.  CALCIUM CARB/VIT D3/MINERALS (CALTRATE PLUS PO) Take 1 Tab by mouth daily. Allergies   Allergen Reactions    Ciprofloxacin Other (comments)     Insomnia and hot flashes    Demerol [Meperidine] Other (comments)     hallucinations    Doxycycline Unknown (comments)       \"Feeling flushed\"    Floxin [Ofloxacin] Other (comments)     insomnia    Lisinopril Cough    Phenergan-Codeine [Promethazine-Codeine] Itching     Patient stated she started itching when she was tapering off of medication.     Prednisone Other (comments)     Has feelings of depression, \"weird thoughts\"         Past Medical History:   Diagnosis Date    C section 3/12/2010    Depression 3/12/2010    Diabetes ([facility] [address]     Environmental allergies 3/12/2010    GERD (gastroesophageal reflux disease)     H/O: hysterectomy 3/12/2010    High cholesterol 3/12/2010    HTN (hypertension) 3/12/2010    Pneumonia 2017         Past Surgical History:   Procedure Laterality Date    COLONOSCOPY N/A 7/15/2020    COL[facility]an [facility] ENDOSCOPY    ENDOSCOPY, COLON, DIAGNOSTIC      repeat 10 yrs (Dr. Jazzmine Arroyo)    HX BREAST BIOPSY  1980    bilateral negative    HX BREAST BIOPSY  1986    left negative    HX  SECTION      x 3    HX CORNEAL TRANSPLANT  2011    left eye-     HX CYSTOSTOMY  3/2015    HX HYSTERECTOMY  1998         Family History   Problem Relation Age of Onset    Hypertension Mother     Other Mother         obesity, chf    Heart Disease Mother         CHF  Kidney Disease Mother     High Cholesterol Mother     Cancer Father         lung    Breast Cancer Sister     Mult Sclerosis Sister     High Cholesterol Sister     Hypertension Sister     Cancer Sister         breast    Hypertension Brother     Liver Disease Brother         Hep C    High Cholesterol Brother     Cancer Brother         colon    Hypertension Sister     Diabetes Sister     High Cholesterol Sister     Hypertension Sister     Other Sister         obese    High Cholesterol Sister     Cancer Niece         cervical       Social History     Tobacco Use    Smoking status: Never Smoker    Smokeless tobacco: Never Used   Substance Use Topics    Alcohol use:  Yes     Alcohol/week: 0.0 standard drinks     Comment: occasional        Lab Results   Component Value Date/Time    WBC 14.3 (H) 01/29/2022 01:00 AM    HGB 14.1 01/29/2022 01:00 AM    Hemoglobin (POC) 15.0 09/29/2016 05:26 PM    HCT 43.6 01/29/2022 01:00 AM    Hematocrit (POC) 44 09/29/2016 05:26 PM    PLATELET 136 87/64/9892 01:00 AM    MCV 90.8 01/29/2022 01:00 AM     Lab Results   Component Value Date/Time    Cholesterol, total 207 (H) 07/13/2021 10:51 AM    HDL Cholesterol 44 07/13/2021 10:51 AM    LDL, calculated 136.8 (H) 07/13/2021 10:51 AM    Triglyceride 131 07/13/2021 10:51 AM    CHOL/HDL Ratio 4.7 07/13/2021 10:51 AM     Lab Results   Component Value Date/Time    TSH 2.450 06/04/2019 01:29 PM      Lab Results   Component Value Date/Time    Sodium 137 01/29/2022 01:00 AM    Potassium 3.8 01/29/2022 01:00 AM    Chloride 101 01/29/2022 01:00 AM    CO2 29 01/29/2022 01:00 AM    Anion gap 7 01/29/2022 01:00 AM    Glucose 115 (H) 01/29/2022 01:00 AM    BUN 12 01/29/2022 01:00 AM    Creatinine 0.85 01/29/2022 01:00 AM    BUN/Creatinine ratio 14 01/29/2022 01:00 AM    GFR est AA >60 01/29/2022 01:00 AM    GFR est non-AA >60 01/29/2022 01:00 AM    Calcium 9.6 01/29/2022 01:00 AM    Bilirubin, total 0.3 01/29/2022 01:00 AM    ALT (SGPT) 20 01/29/2022 01:00 AM    Alk. phosphatase 77 01/29/2022 01:00 AM    Protein, total 7.2 01/29/2022 01:00 AM    Albumin 3.9 01/29/2022 01:00 AM    Globulin 3.3 01/29/2022 01:00 AM    A-G Ratio 1.2 01/29/2022 01:00 AM      Lab Results   Component Value Date/Time    Hemoglobin A1c 5.9 (H) 01/09/2021 08:58 AM    Hemoglobin A1c (POC) 5.9 07/13/2021 11:05 AM         Review of Systems   Constitutional: Negative for malaise/fatigue. Wants to get sleep apnea assessment d/t loud snoring. HENT: Negative for congestion. Eyes: Negative for blurred vision. Respiratory: Negative for cough and shortness of breath. Cardiovascular: Negative for chest pain, palpitations and leg swelling. Gastrointestinal: Negative for abdominal pain, constipation and heartburn. Genitourinary: Negative for dysuria, frequency and urgency. Has had blood in the urine for the past dip/UA. No urinary sx noted. Musculoskeletal: Negative for back pain and joint pain. Neurological: Negative for dizziness, tingling and headaches. Endo/Heme/Allergies: Negative for environmental allergies. Psychiatric/Behavioral: Negative for depression. The patient does not have insomnia. Physical Exam  Vitals and nursing note reviewed. Constitutional:       Appearance: Normal appearance. She is well-developed. Comments: /72 (BP 1 Location: Left upper arm, BP Patient Position: Sitting, BP Cuff Size: Adult)   Pulse 68   Temp 98.4 °F (36.9 °C) (Oral)   Resp 18   Ht 5' 6\" (1.676 m)   Wt 261 lb 12.8 oz (118.8 kg)   LMP 11/19/1998   SpO2 95%   BMI 42.26 kg/m²      HENT:      Right Ear: Tympanic membrane and ear canal normal.      Left Ear: Tympanic membrane and ear canal normal.      Nose: No mucosal edema. Neck:      Thyroid: No thyromegaly. Cardiovascular:      Rate and Rhythm: Normal rate and regular rhythm. Heart sounds: Normal heart sounds.    Pulmonary:      Effort: Pulmonary effort is normal. Breath sounds: Normal breath sounds. Abdominal:      General: Bowel sounds are normal.      Palpations: Abdomen is soft. There is no mass. Tenderness: There is no abdominal tenderness. Musculoskeletal:         General: No swelling. Normal range of motion. Cervical back: Normal range of motion and neck supple. Right lower leg: Edema (mild) present. Left lower leg: Edema (mild) present. Lymphadenopathy:      Cervical: No cervical adenopathy. Skin:     General: Skin is warm and dry. Neurological:      General: No focal deficit present. Mental Status: She is alert and oriented to person, place, and time. Psychiatric:         Mood and Affect: Mood normal.         ASSESSMENT and PLAN  Diagnoses and all orders for this visit:    1. Essential hypertension  Discussed sodium restriction, high k rich diet, maintaining ideal body weight and regular exercise program such as daily walking 30 min perday 4-5 times per week, as physiologic means to achieve blood pressure control. Medication compliance advised. 2. Diet-controlled diabetes mellitus ([facility]  Continue to monitor. Work on diet and exercise. -     AMB POC HEMOGLOBIN A1C  -     AMB POC GLUCOSE, QUANTITATIVE, BLOOD    3. Mixed hyperlipidemia  Continue to monitor. Work on diet and exercise.  -     LIPID PANEL; Future    4. Mild depression (HCC)  Stable on celexa. 5. Asymptomatic microscopic hematuria  -     REFERRAL TO UROLOGY  -     URINALYSIS W/MICROSCOPIC; Future  -     CULTURE, URINE; Future    6. Needs sleep apnea assessment  -     SLEEP MEDICINE REFERRAL    7. Encounter for medication monitoring  -     METABOLIC PANEL, BASIC; Future  -     CBC W/O DIFF; Future    8. Obesity, Class III, BMI 40-49.9 (morbid obesity) ([facility] [place] [address]  I have reviewed/discussed the above normal BMI with the patient. I have recommended the following interventions: dietary management education, guidance, and counseling .          Follow-up and Dispositions    · Return in about 3 months (around 5/15/2022). reviewed diet, exercise and weight control  cardiovascular risk and specific lipid/LDL goals reviewed  reviewed medications and side effects in detail  specific diabetic recommendations: low cholesterol diet, weight control and daily exercise discussed and glycohemoglobin and other lab monitoring discussed     I have discussed diagnosis listed in this note with pt and/or family. I have discussed treatment plans and options and the risk/benefit analysis of those options, including safe use of medications and possible medication side effects. Through the use of shared decision making we have agreed to the above plan. The patient has received an after-visit summary and questions were answered concerning future plans and follow up. Advise pt of any urgent changes then to proceed to the ER.

## 2022-02-15 NOTE — PROGRESS NOTES
Identified pt with two pt identifiers(name and ). Reviewed record in preparation for visit and have obtained necessary documentation. All patient medications has been reviewed. Chief Complaint   Patient presents with    Diabetes       3 most recent PHQ Screens 2/15/2022   Little interest or pleasure in doing things Not at all   Feeling down, depressed, irritable, or hopeless Not at all   Total Score PHQ 2 0     Abuse Screening Questionnaire 2/15/2022   Do you ever feel afraid of your partner? N   Are you in a relationship with someone who physically or mentally threatens you? N   Is it safe for you to go home? Y       Health Maintenance Due   Topic    Breast Cancer Screen Mammogram      Health Maintenance Review: Patient reminded of \"due or due soon\" health maintenance. I have asked the patient to contact his/her primary care provider (PCP) for follow-up on his/her health maintenance. Vitals:    02/15/22 0848   BP: 135/72   Pulse: 68   Resp: 18   Temp: 98.4 °F (36.9 °C)   TempSrc: Oral   SpO2: 95%   Weight: 261 lb 12.8 oz (118.8 kg)   Height: 5' 6\" (1.676 m)   LMP: 1998       Wt Readings from Last 3 Encounters:   02/15/22 261 lb 12.8 oz (118.8 kg)   22 260 lb (117.9 kg)   21 261 lb (118.4 kg)     Temp Readings from Last 3 Encounters:   02/15/22 98.4 °F (36.9 °C) (Oral)   22 98.5 °F (36.9 °C)   21 98.9 °F (37.2 °C) (Oral)     BP Readings from Last 3 Encounters:   02/15/22 135/72   22 127/89   21 136/68     Pulse Readings from Last 3 Encounters:   02/15/22 68   22 80   21 70       Coordination of Care Questionnaire:   1) Have you been to an emergency room, urgent care, or hospitalized since your last visit? No      2. Have seen or consulted any other health care provider since your last visit?   No

## 2022-02-16 LAB
ANION GAP SERPL CALC-SCNC: 0 MMOL/L (ref 5–15)
APPEARANCE UR: ABNORMAL
BACTERIA URNS QL MICRO: ABNORMAL /HPF
BILIRUB UR QL: NEGATIVE
BUN SERPL-MCNC: 15 MG/DL (ref 6–20)
BUN/CREAT SERPL: 19 (ref 12–20)
CALCIUM SERPL-MCNC: 9.7 MG/DL (ref 8.5–10.1)
CHLORIDE SERPL-SCNC: 106 MMOL/L (ref 97–108)
CHOLEST SERPL-MCNC: 181 MG/DL
CO2 SERPL-SCNC: 32 MMOL/L (ref 21–32)
COLOR UR: ABNORMAL
CREAT SERPL-MCNC: 0.77 MG/DL (ref 0.55–1.02)
EPITH CASTS URNS QL MICRO: ABNORMAL /LPF
ERYTHROCYTE [DISTWIDTH] IN BLOOD BY AUTOMATED COUNT: 14.2 % (ref 11.5–14.5)
GLUCOSE SERPL-MCNC: 123 MG/DL (ref 65–100)
GLUCOSE UR STRIP.AUTO-MCNC: NEGATIVE MG/DL
HCT VFR BLD AUTO: 43.8 % (ref 35–47)
HDLC SERPL-MCNC: 47 MG/DL
HDLC SERPL: 3.9 {RATIO} (ref 0–5)
HGB BLD-MCNC: 13.3 G/DL (ref 11.5–16)
HGB UR QL STRIP: ABNORMAL
KETONES UR QL STRIP.AUTO: NEGATIVE MG/DL
LDLC SERPL CALC-MCNC: 107 MG/DL (ref 0–100)
LEUKOCYTE ESTERASE UR QL STRIP.AUTO: ABNORMAL
MCH RBC QN AUTO: 28.6 PG (ref 26–34)
MCHC RBC AUTO-ENTMCNC: 30.4 G/DL (ref 30–36.5)
MCV RBC AUTO: 94.2 FL (ref 80–99)
NITRITE UR QL STRIP.AUTO: NEGATIVE
NRBC # BLD: 0 K/UL (ref 0–0.01)
NRBC BLD-RTO: 0 PER 100 WBC
PH UR STRIP: 5.5 [PH] (ref 5–8)
PLATELET # BLD AUTO: 327 K/UL (ref 150–400)
PMV BLD AUTO: 10.8 FL (ref 8.9–12.9)
POTASSIUM SERPL-SCNC: 4.2 MMOL/L (ref 3.5–5.1)
PROT UR STRIP-MCNC: NEGATIVE MG/DL
RBC # BLD AUTO: 4.65 M/UL (ref 3.8–5.2)
RBC #/AREA URNS HPF: ABNORMAL /HPF (ref 0–5)
SODIUM SERPL-SCNC: 138 MMOL/L (ref 136–145)
SP GR UR REFRACTOMETRY: 1.02 (ref 1–1.03)
TRIGL SERPL-MCNC: 135 MG/DL (ref ?–150)
UROBILINOGEN UR QL STRIP.AUTO: 0.2 EU/DL (ref 0.2–1)
VLDLC SERPL CALC-MCNC: 27 MG/DL
WBC # BLD AUTO: 8.7 K/UL (ref 3.6–11)
WBC URNS QL MICRO: ABNORMAL /HPF (ref 0–4)
YEAST BUDDING URNS QL: PRESENT

## 2022-02-16 RX ORDER — SEMAGLUTIDE 1.34 MG/ML
INJECTION, SOLUTION SUBCUTANEOUS
Qty: 1 BOX | Refills: 3 | Status: SHIPPED | OUTPATIENT
Start: 2022-02-16 | End: 2022-04-05 | Stop reason: ALTCHOICE

## 2022-02-16 NOTE — PROGRESS NOTES
Sending order to pharmacy for ozempic to check on coverage for patient, per referral from Dr. Angelica Cheatham

## 2022-02-17 LAB
BACTERIA SPEC CULT: NORMAL
CC UR VC: NORMAL
SERVICE CMNT-IMP: NORMAL

## 2022-02-21 NOTE — PROGRESS NOTES
CC:  ??    S/O: Katie Bhatt is a 78 y.o. female       Past Medical History:   Diagnosis Date    C section 3/12/2010    Depression 3/12/2010    Diabetes ([facility] [address]     Environmental allergies 3/12/2010    GERD (gastroesophageal reflux disease)     H/O: hysterectomy 3/12/2010    High cholesterol 3/12/2010    HTN (hypertension) 3/12/2010    Pneumonia 04/2017     Current Outpatient Medications   Medication Sig    semaglutide (Ozempic) 0.25 mg or 0.5 mg/dose (2 mg/1.5 ml) subq pen Inject 0.25 mg every 7 days for 4 weeks, then increase to 0.5 mg every 7 days.  levocetirizine (XYZAL) 5 mg tablet Take 1 Tablet by mouth daily.  albuterol (ProAir HFA) 90 mcg/actuation inhaler Take 2 Puffs by inhalation every four (4) hours as needed for Wheezing or Shortness of Breath. Please instruct Pt on how to use an inhaler.  citalopram (CELEXA) 20 mg tablet Take 1 Tablet by mouth daily.  furosemide (LASIX) 20 mg tablet TAKE ONE TABLET BY MOUTH DAILY    triamcinolone acetonide (KENALOG) 0.1 % topical cream APPLY TO AFFECTED AREA(S) TWO TIMES A DAY AS NEEDED FOR SKIN IRRITATION, USE A THIN LAYER TO RASH ON HER NECK    terbinafine HCL (LAMISIL) 1 % topical cream Apply  to affected area two (2) times daily as needed.  fluticasone propionate (FLONASE) 50 mcg/actuation nasal spray SPRAY TWO SPRAYS IN EACH NOSTRIL ONCE DAILY AS NEEDED FOR ALLERGIES    omeprazole (PRILOSEC) 40 mg capsule TAKE ONE CAPSULE BY MOUTH DAILY    pravastatin (PRAVACHOL) 20 mg tablet TAKE TWO TABLETS BY MOUTH ONCE NIGHTLY    losartan-hydroCHLOROthiazide (HYZAAR) 50-12.5 mg per tablet TAKE ONE TABLET BY MOUTH DAILY    OTHER Eldeberry 1 tablet daily    OTHER Emergency --1 tablet twice a week    diclofenac (Voltaren) 1 % gel Apply topically to affected area daily as needed    loratadine (CLARITIN) 10 mg tablet Take 10 mg by mouth daily.  cholecalciferol (VITAMIN D3) 1,000 unit tablet Take 1,000 Units by mouth daily.     ALPRAZolam Thi Burrell) 0.5 mg tablet TAKE ONE TABLET BY MOUTH AT NIGHTLY AS NEEDED    melatonin 3 mg tablet Take 3 mg by mouth nightly as needed.  acetaminophen (TYLENOL ARTHRITIS PAIN) 650 mg CR tablet Take 1,300 mg by mouth every six (6) hours as needed for Pain.  CALCIUM CARB/VIT D3/MINERALS (CALTRATE PLUS PO) Take 1 Tab by mouth daily. No current facility-administered medications for this visit.        Data reviewed:  Lab Results   Component Value Date/Time    Hemoglobin A1c 5.9 (H) 01/09/2021 08:58 AM    Hemoglobin A1c (POC) 6.3 02/15/2022 09:13 AM     Lab Results   Component Value Date/Time    Cholesterol, total 181 02/15/2022 09:23 AM    HDL Cholesterol 47 02/15/2022 09:23 AM    LDL, calculated 107 (H) 02/15/2022 09:23 AM    VLDL, calculated 27 02/15/2022 09:23 AM    Triglyceride 135 02/15/2022 09:23 AM    CHOL/HDL Ratio 3.9 02/15/2022 09:23 AM

## 2022-02-22 ENCOUNTER — TELEPHONE (OUTPATIENT)
Dept: FAMILY MEDICINE CLINIC | Age: 67
End: 2022-02-22

## 2022-02-22 NOTE — TELEPHONE ENCOUNTER
----- Message from Stanford Dubin sent at 2/21/2022  1:00 PM EST -----  Regarding: RE: ozempic covered? Going through insurance for $24.99 for 1 pen   Has not been picked up (per pharmacy). Called and spoke with her. She plans on picking up the medication today (had trouble getting out over the weekend due to possible gout). She will bring the ozempic to the appointment but is interested in a Sunday start for the medications if she only has to use it once a week. I told her we have demo pens here and that may be possible pending your conversation with her.     ----- Message -----  From: Shu Garrett, PHARMD  Sent: 2/21/2022   9:18 AM EST  To: Stanford Dubin  Subject: ozempic covered? Hi, sent an order for ozempic to pharmacy to check coverage before upcoming visit with me on 2/24. Do not see a PA in cover my meds. Can you call her pharmacy to see if it's covered? If so, call patient to find out if she's picked it up and if so, ask to bring to visit. You can find the pharmacy by clicking on the medication in the med list and the details come up including the pharmacy.    Thanks

## 2022-02-24 ENCOUNTER — OFFICE VISIT (OUTPATIENT)
Dept: FAMILY MEDICINE CLINIC | Age: 67
End: 2022-02-24

## 2022-02-24 DIAGNOSIS — E11.9 DIET-CONTROLLED DIABETES MELLITUS (HCC): Primary | ICD-10-CM

## 2022-02-24 RX ORDER — CHOLECALCIFEROL (VITAMIN D3) 125 MCG
100 CAPSULE ORAL DAILY
COMMUNITY
End: 2022-04-05 | Stop reason: ALTCHOICE

## 2022-02-24 NOTE — PROGRESS NOTES
CC:  Diabetes management/education    S/O: Basil Hollingsworth is a 78 y.o. female referred by Dr. Kwabena Macias MD for diabetes management and Ozempic initiation. Patient reports doing well, felt a bit anxious this morning due to seeing the news about war. HPI: During her last PCP visit in the past month, MD wanted to start her on Ozempic and discuss with pharmacist about how to use Ozempic before starting (new patient for pharmacy). Noted SOB was improving and having fatigue and intermittent anxiety. Cost of ozempic verified at $24.99 and patient reports wanting to start ozempic on Sunday. Pre-Diabetes  Patient denies hypoglycemic and hyperglycemic signs/symptoms. Patient does not measure BG at home but expressed interest in it. Stated she might be able to get one for free through her or her 's insurance. Patient reports never having an A1C >6.5% to her recollection and to her understanding, she is pre-diabetic. Current Diabetes Regimen:  Ozempic 0.25mg SC once weekly x 4 weeks, then increase to 0.5mg once weekly thereafter (to start 2/27, Sunday)    Presented with a paper of her recent labs including her recent A1C 6.3%. Patient noted how she was getting close to the range to be classified as \"diabetes\" on the paper, so she is concerned. Hyperlipidemia:   She pointed out how her LDL was 107 on her lab paper which she was concerned about. Expresses motivation to come off having to be on two tablets of pravastatin with exercising more and improving her diet to get her LDL below 100 and A1C lower. Reports recently starting CoEnzyme Q10 to assist with LDL lowering. Of note, reported she has muscle cramps with being on lipitor in the past.      Diet:  Tries to eat well and asked about if we have a brochure with nutritional information. Noted that with COVID and other stressors in her life has caused her to eat more. Uses canola oil to cook her meals.  Only drinks water, stopped From: Aniyah Stratton  To: Kamilah Fortune  Sent: 6/4/2021 4:59 AM CDT  Subject: Medication Question    Good morning,    I need to request a refill for the following medicines that were prescribed under GUERDA Scott.    1 injection of 120mg/ml Emgality   4mg of ondansetron (Zofran)    Both can be sent to [facility] on [facility] in [place].    I'd also like to add since starting the Emgality shot last month my migraines and headaches have reduced a lot.    Thanks,   Aniyah   drinking coffee as often due to metallic taste [id number], started drinking tea with tumeric, avoids sodas (when she does drink them occasionally, she drinks diet sodas). Exercise:  Tries to exercise but hesitant to go out as much due to [place]. Lives in an area where there isn't a sidewalk and during the spring/summer months, there are snakes in the neighborhood.     Past Medical History:   Diagnosis Date    C section 3/12/2010    Depression 3/12/2010    Diabetes ([address]     Environmental allergies 3/12/2010    GERD (gastroesophageal reflux disease)     H/O: hysterectomy 3/12/2010    High cholesterol 3/12/2010    HTN (hypertension) 3/12/2010    Pneumonia 2017     Past Surgical History:   Procedure Laterality Date    COLONOSCOPY N/A 7/15/2020    COLONOSCOPY performed by Richard Jackson MD at [facility] ENDOSCOPY    ENDOSCOPY, COLON, DIAGNOSTIC      repeat 10 yrs (Dr. Al Hagan)    HX BREAST BIOPSY  1980    bilateral negative    HX BREAST BIOPSY  1986    left negative    HX  SECTION      x 3    HX CORNEAL TRANSPLANT  2011    left eye-     HX CYSTOSTOMY  3/2015    HX HYSTERECTOMY  1998     Family History   Problem Relation Age of Onset    Hypertension Mother     Other Mother         obesity, chf    Heart Disease Mother         CHF    Kidney Disease Mother     High Cholesterol Mother     Cancer Father         lung    Breast Cancer Sister     Mult Sclerosis Sister     High Cholesterol Sister     Hypertension Sister     Cancer Sister         breast    Hypertension Brother     Liver Disease Brother         Hep C    High Cholesterol Brother     Cancer Brother         colon    Hypertension Sister     Diabetes Sister     High Cholesterol Sister     Hypertension Sister     Other Sister         obese    High Cholesterol Sister     Cancer Niece         cervical     Social History     Socioeconomic History    Marital status:    Tobacco Use    Smoking status: Never Smoker    Smokeless tobacco: Never Used   Vaping Use    Vaping Use: Never used   Substance and Sexual Activity    Alcohol use: Yes     Alcohol/week: 0.0 standard drinks     Comment: occasional    Drug use: No    Sexual activity: Yes     Partners: Male     Birth control/protection: Surgical     Allergies   Allergen Reactions    Ciprofloxacin Other (comments)     Insomnia and hot flashes    Demerol [Meperidine] Other (comments)     hallucinations    Doxycycline Unknown (comments)       \"Feeling flushed\"    Floxin [Ofloxacin] Other (comments)     insomnia    Lisinopril Cough    Phenergan-Codeine [Promethazine-Codeine] Itching     Patient stated she started itching when she was tapering off of medication.  Prednisone Other (comments)     Has feelings of depression, \"weird thoughts\"     Current Outpatient Medications   Medication Sig    semaglutide (Ozempic) 0.25 mg or 0.5 mg/dose (2 mg/1.5 ml) subq pen Inject 0.25 mg every 7 days for 4 weeks, then increase to 0.5 mg every 7 days.  levocetirizine (XYZAL) 5 mg tablet Take 1 Tablet by mouth daily.  albuterol (ProAir HFA) 90 mcg/actuation inhaler Take 2 Puffs by inhalation every four (4) hours as needed for Wheezing or Shortness of Breath. Please instruct Pt on how to use an inhaler.  citalopram (CELEXA) 20 mg tablet Take 1 Tablet by mouth daily.  furosemide (LASIX) 20 mg tablet TAKE ONE TABLET BY MOUTH DAILY    triamcinolone acetonide (KENALOG) 0.1 % topical cream APPLY TO AFFECTED AREA(S) TWO TIMES A DAY AS NEEDED FOR SKIN IRRITATION, USE A THIN LAYER TO RASH ON HER NECK    terbinafine HCL (LAMISIL) 1 % topical cream Apply  to affected area two (2) times daily as needed.     omeprazole (PRILOSEC) 40 mg capsule TAKE ONE CAPSULE BY MOUTH DAILY    pravastatin (PRAVACHOL) 20 mg tablet TAKE TWO TABLETS BY MOUTH ONCE NIGHTLY    losartan-hydroCHLOROthiazide (HYZAAR) 50-12.5 mg per tablet TAKE ONE TABLET BY MOUTH DAILY    OTHER Eldeberry 1 tablet daily    diclofenac (Voltaren) 1 % gel Apply topically to affected area daily as needed    loratadine (CLARITIN) 10 mg tablet Take 10 mg by mouth daily.  ALPRAZolam (XANAX) 0.5 mg tablet TAKE ONE TABLET BY MOUTH AT NIGHTLY AS NEEDED    acetaminophen (TYLENOL ARTHRITIS PAIN) 650 mg CR tablet Take 1,300 mg by mouth every six (6) hours as needed for Pain.  CALCIUM CARB/VIT D3/MINERALS (CALTRATE PLUS PO) Take 1 Tab by mouth daily.  fluticasone propionate (FLONASE) 50 mcg/actuation nasal spray SPRAY TWO SPRAYS IN EACH NOSTRIL ONCE DAILY AS NEEDED FOR ALLERGIES    OTHER Emergency --1 tablet twice a week    melatonin 3 mg tablet Take 3 mg by mouth nightly as needed. (Patient not taking: Reported on 2/24/2022)     No current facility-administered medications for this visit. Visit Vitals  LMP 11/19/1998       Data reviewed:  Lab Results   Component Value Date/Time    Hemoglobin A1c 5.9 (H) 01/09/2021 08:58 AM    Hemoglobin A1c (POC) 6.3 02/15/2022 09:13 AM     Lab Results   Component Value Date/Time    Cholesterol, total 181 02/15/2022 09:23 AM    HDL Cholesterol 47 02/15/2022 09:23 AM    LDL, calculated 107 (H) 02/15/2022 09:23 AM    VLDL, calculated 27 02/15/2022 09:23 AM    Triglyceride 135 02/15/2022 09:23 AM    CHOL/HDL Ratio 3.9 02/15/2022 09:23 AM     Lab Results   Component Value Date/Time    ALT (SGPT) 20 01/29/2022 01:00 AM    AST (SGOT) 12 (L) 01/29/2022 01:00 AM    Alk. phosphatase 77 01/29/2022 01:00 AM    Bilirubin, direct 0.11 12/13/2010 08:47 AM    Bilirubin, total 0.3 01/29/2022 01:00 AM     Lab Results   Component Value Date/Time    Creatinine (POC) 0.8 09/29/2016 05:26 PM    Creatinine 0.77 02/15/2022 09:23 AM     Lab Results   Component Value Date/Time    Microalbumin/Creat ratio (mg/g creat) 7 07/13/2021 10:51 AM    Microalbumin,urine random 1.08 07/13/2021 10:51 AM     Assessment/Plan:     1.   Pre-Diabetes:  A) Controlled, A1C <6.5% per ADA classification of Diabetes. Patient is motivated to prevent her A1C from going up. P) Patient to start Ozempic 0.25mg once weekly x 4 weeks, then increase to 0.5mg once weekly on. Performed Ozempic education during visit as well and counseled patient on reaching out to office if having severe side effects. · Reviewed Ozempic in detail, how to inject, store, attach pen needle and dispose of pen needle, use on needle per injection, injection sites, rotation of sites, once weekly - pick day - what to do if miss day, potential side effects and what to do, holding pen in site after injecting to make sure medication is fully injected, expected effects, dose titration, amount of medication in pen, how the medication works, documenting doses given on ozempic box. · Patient was able to successfully demonstrate using a demo pen how to administer Ozempic to herself with minimal assistance during visit. · Answered patient's questions about potential DDIs (none of concern at time of visit), how to store the medication, appropriate timing of medication    P) Encourage her to get more exercise (such was walking around the house, going outside when she feels comfortable doing so). Encourage her to continue avoiding sugary drinks, drink plenty of water, avoid foods with trans fats/saturated fats. Re-iterated with her diet that \"everything in moderation\" and to balance healthy and unhealthy foods if having trouble with stopping the unhealthy foods cold turkey. Will plan to mail her nutritional brochure. 2.  Hypertension:    A) BP at last PCP visit last week is at goal of < 140/90 mm Hg per the ADA guidelines. P) Continue losartan-HCTZ 50-12.5mg once daily. See Diabetes for Diet/Exercise plan. 3.  Hyperlipidemia:    A) LDL currently is not <100 mg/dL but is close to goal and is trending down. Patient is motivated to improve her LDL and to come off her medication.   P) Continue pravastatin 20mg, 2 tab once nightly and CoEnzyme Q10 100mg, 1 tab once daily  P) See Diabetes for Diet/Exercise plan  P) Encourage her to get more exercise as noted above. Made her aware that there is a statin (rosuvastatin) that has more LDL lowering activity and has less risk of muscle cramps than Lipitor, but will see how diet/exercise/CoEnzyme Q10 works out. 4. Anxiety  A) Patient noted she was anxious in the morning but improved as we progressed through the visit. Appears to have a good support system at home, and sparingly takes her Xanax at home. P) Continue citalopram 20mg once daily and xanax 0.5mg once nightly prn. Counseled about the sedating effects of Xanax and how to take the medication when she is at home and/or won't be doing anything too heavy or strenous. Counseled how the combination of alcohol and xanax can potentiate the sedating effects. 4.  Medication reconciliation was completed during the visit. · Added CoEnzyme Q10 to medication list and Elderberry/VitaminC/Zinc/VitaminD3 supplement      Medications Discontinued During This Encounter   Medication Reason    cholecalciferol (VITAMIN D3) 1,000 unit tablet LIST CLEANUP       Patient verbalized understanding of the information presented and all of the patients questions were answered. Patient advised to call me or Dr. Aundra Merlin, MD with any additional questions or concerns.     Follow-up: with PCP in about 5 weeks for Ozempic (new med)    Thank you for the consult,  Fauzia Wu, PharmD, Pharmacy Resident    Time spent with the patient:  60 minutes  Time spent documentin minutes

## 2022-02-28 NOTE — PROGRESS NOTES
Agree with documentation noted by Napoleon Kate, PharmD, Pharmacy Resident with the following note: Medical chart indicates pt has Diet-controlled diabetes vs. Prediabetes noted in Kareen's note; will d/w PCP - there may have been an older A1c over 6.5%. Ozempic teaching and initiation. Has follow up with PCP in April to assess tolerability and efficacy. Follow up as desired. Den Cordero, BETHD, [facility] [address] in place:  Yes   Recommendation Provided To: Patient/Caregiver: 1 via In person   Intervention Detail: Device Training   Intervention Accepted By: Patient/Caregiver: 1   Time Spent (min): 60

## 2022-03-11 ENCOUNTER — TELEPHONE (OUTPATIENT)
Dept: FAMILY MEDICINE CLINIC | Age: 67
End: 2022-03-11

## 2022-03-11 DIAGNOSIS — R31.21 ASYMPTOMATIC MICROSCOPIC HEMATURIA: Primary | ICD-10-CM

## 2022-03-11 NOTE — TELEPHONE ENCOUNTER
Called patient and informed that Dr. Nazanin Wells will be referring to [facility], Dr. Nettie Bhatti. and Edy Garcia , in referral office, will call back with date and time of appt. Patient verbalized understanding.

## 2022-03-15 ENCOUNTER — TRANSCRIBE ORDER (OUTPATIENT)
Dept: SCHEDULING | Age: 67
End: 2022-03-15

## 2022-03-15 DIAGNOSIS — Z12.31 SCREENING MAMMOGRAM FOR HIGH-RISK PATIENT: Primary | ICD-10-CM

## 2022-03-19 PROBLEM — Z91.89 AT HIGH RISK FOR BREAST CANCER: Status: ACTIVE | Noted: 2017-10-25

## 2022-03-19 PROBLEM — E11.40 TYPE 2 DIABETES MELLITUS WITH DIABETIC NEUROPATHY (HCC): Status: ACTIVE | Noted: 2020-06-17

## 2022-03-20 PROBLEM — Z80.0 FH: COLON CANCER: Status: ACTIVE | Noted: 2018-05-15

## 2022-03-20 PROBLEM — Z51.81 ENCOUNTER FOR MEDICATION MONITORING: Status: ACTIVE | Noted: 2017-04-28

## 2022-03-20 PROBLEM — E66.01 OBESITY, MORBID (HCC): Status: ACTIVE | Noted: 2018-01-15

## 2022-04-05 ENCOUNTER — OFFICE VISIT (OUTPATIENT)
Dept: FAMILY MEDICINE CLINIC | Age: 67
End: 2022-04-05
Payer: MEDICARE

## 2022-04-05 VITALS
WEIGHT: 260 LBS | DIASTOLIC BLOOD PRESSURE: 70 MMHG | SYSTOLIC BLOOD PRESSURE: 140 MMHG | HEART RATE: 72 BPM | RESPIRATION RATE: 14 BRPM | TEMPERATURE: 97.8 F | BODY MASS INDEX: 41.78 KG/M2 | OXYGEN SATURATION: 97 % | HEIGHT: 66 IN

## 2022-04-05 DIAGNOSIS — Z51.81 ENCOUNTER FOR MEDICATION MONITORING: ICD-10-CM

## 2022-04-05 DIAGNOSIS — E11.9 TYPE 2 DIABETES MELLITUS WITHOUT COMPLICATION, WITHOUT LONG-TERM CURRENT USE OF INSULIN (HCC): Primary | ICD-10-CM

## 2022-04-05 LAB — GLUCOSE POC: 97 MG/DL

## 2022-04-05 PROCEDURE — G9899 SCRN MAM PERF RSLTS DOC: HCPCS | Performed by: FAMILY MEDICINE

## 2022-04-05 PROCEDURE — 82947 ASSAY GLUCOSE BLOOD QUANT: CPT | Performed by: FAMILY MEDICINE

## 2022-04-05 PROCEDURE — G8427 DOCREV CUR MEDS BY ELIG CLIN: HCPCS | Performed by: FAMILY MEDICINE

## 2022-04-05 PROCEDURE — G8753 SYS BP > OR = 140: HCPCS | Performed by: FAMILY MEDICINE

## 2022-04-05 PROCEDURE — 3044F HG A1C LEVEL LT 7.0%: CPT | Performed by: FAMILY MEDICINE

## 2022-04-05 PROCEDURE — 2022F DILAT RTA XM EVC RTNOPTHY: CPT | Performed by: FAMILY MEDICINE

## 2022-04-05 PROCEDURE — G8399 PT W/DXA RESULTS DOCUMENT: HCPCS | Performed by: FAMILY MEDICINE

## 2022-04-05 PROCEDURE — G0463 HOSPITAL OUTPT CLINIC VISIT: HCPCS | Performed by: FAMILY MEDICINE

## 2022-04-05 PROCEDURE — 99212 OFFICE O/P EST SF 10 MIN: CPT | Performed by: FAMILY MEDICINE

## 2022-04-05 PROCEDURE — 1101F PT FALLS ASSESS-DOCD LE1/YR: CPT | Performed by: FAMILY MEDICINE

## 2022-04-05 PROCEDURE — 3017F COLORECTAL CA SCREEN DOC REV: CPT | Performed by: FAMILY MEDICINE

## 2022-04-05 PROCEDURE — G8754 DIAS BP LESS 90: HCPCS | Performed by: FAMILY MEDICINE

## 2022-04-05 PROCEDURE — 1090F PRES/ABSN URINE INCON ASSESS: CPT | Performed by: FAMILY MEDICINE

## 2022-04-05 PROCEDURE — G9717 DOC PT DX DEP/BP F/U NT REQ: HCPCS | Performed by: FAMILY MEDICINE

## 2022-04-05 PROCEDURE — G8417 CALC BMI ABV UP PARAM F/U: HCPCS | Performed by: FAMILY MEDICINE

## 2022-04-05 PROCEDURE — G8536 NO DOC ELDER MAL SCRN: HCPCS | Performed by: FAMILY MEDICINE

## 2022-04-05 RX ORDER — LANOLIN ALCOHOL/MO/W.PET/CERES
1000 CREAM (GRAM) TOPICAL DAILY
COMMUNITY

## 2022-04-05 RX ORDER — SEMAGLUTIDE 1.34 MG/ML
0.5 INJECTION, SOLUTION SUBCUTANEOUS
COMMUNITY
End: 2022-07-11 | Stop reason: SDUPTHER

## 2022-04-05 RX ORDER — ONDANSETRON 4 MG/1
4 TABLET, ORALLY DISINTEGRATING ORAL
Qty: 20 TABLET | Refills: 0 | Status: SHIPPED | OUTPATIENT
Start: 2022-04-05 | End: 2022-09-02 | Stop reason: ALTCHOICE

## 2022-04-05 RX ORDER — INSULIN PUMP SYRINGE, 3 ML
EACH MISCELLANEOUS
Qty: 1 KIT | Refills: 0 | Status: SHIPPED | OUTPATIENT
Start: 2022-04-05

## 2022-04-05 RX ORDER — IBUPROFEN 200 MG
CAPSULE ORAL
Qty: 100 STRIP | Refills: 3 | Status: SHIPPED | OUTPATIENT
Start: 2022-04-05

## 2022-04-05 RX ORDER — LANCETS
EACH MISCELLANEOUS
Qty: 100 EACH | Refills: 3 | Status: SHIPPED | OUTPATIENT
Start: 2022-04-05

## 2022-04-05 NOTE — PROGRESS NOTES
Chief Complaint   Patient presents with    Diabetes       1. \"Have you been to the ER, urgent care clinic since your last visit? Hospitalized since your last visit? \" No    2. \"Have you seen or consulted any other health care providers outside of the [address] [place] [address] since your last visit? \" Yes, Urology March 2022 and was told no blood in urine and to follow back up with Trever Nguyen MD     3. For patients aged 39-70: Has the patient had a colonoscopy / FIT/ Cologuard? Yes - Care Gap present. Most recent result on file      If the patient is female:    4. For patients aged 41-77: Has the patient had a mammogram within the past 2 years? No      5. For patients aged 21-65: Has the patient had a pap smear?  NA - based on age or sex    Mammo - scheduled 4/22/22    Health Maintenance Due   Topic Date Due    Breast Cancer Screen Mammogram  02/23/2022

## 2022-04-05 NOTE — PROGRESS NOTES
HISTORY OF PRESENT ILLNESS  Link Askew is a 75 y.o. female. HPI   Follow up on diabetes and starting on Ozempic. Ligia Amanda DM type II follow up:  She met with omega and has started on the Ozempic. Started to have more nausea since she has increased the dose to the 0.5 mg for the last 2 weeks. She is going to try to stick it out to see if it gets better. Also advise to take the medication at night to see if this will help with the nausea. Compliant w/ diabetic diet, and exercise. Has not been checking BS recently. Denies any tingling sensation, polyuria and polydipsia. No blurred vision. No significant weight changes. Feeling well since last OV. Patient Active Problem List   Diagnosis Code    Environmental allergies Z91.09    Depression F32. 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  cyanocobalamin (Vitamin B-12) 1,000 mcg tablet Take 1,000 mcg by mouth daily.  ondansetron (ZOFRAN ODT) 4 mg disintegrating tablet Take 1 Tablet by mouth every eight (8) hours as needed for Nausea or Vomiting. 20 Tablet 0    lancets misc Monitor BG daily  Dx: E11.9. Provide with lancets insurance covers 100 Each 3    Blood-Glucose Meter monitoring kit Monitor BG daily  Dx: E11.9.  Provide with glucometer insurance covers 1 Kit 0    glucose blood VI test strips (blood glucose test) strip Monitor BG daily  Dx: E11.9  Provide with test strips insurance covers 100 Strip 3    OTHER Take 1 Tablet by mouth daily. Elderberry/VitaminC/Zinc/Vitamin D3 combination product      levocetirizine (XYZAL) 5 mg tablet Take 1 Tablet by mouth daily. 90 Tablet 3    albuterol (ProAir HFA) 90 mcg/actuation inhaler Take 2 Puffs by inhalation every four (4) hours as needed for Wheezing or Shortness of Breath. Please instruct Pt on how to use an inhaler. 1 Each 3    citalopram (CELEXA) 20 mg tablet Take 1 Tablet by mouth daily. 90 Tablet 3    furosemide (LASIX) 20 mg tablet TAKE ONE TABLET BY MOUTH DAILY 90 Tablet 1    triamcinolone acetonide (KENALOG) 0.1 % topical cream APPLY TO AFFECTED AREA(S) TWO TIMES A DAY AS NEEDED FOR SKIN IRRITATION, USE A THIN LAYER TO RASH ON HER NECK 15 g 0    terbinafine HCL (LAMISIL) 1 % topical cream Apply  to affected area two (2) times daily as needed. 30 g 0    fluticasone propionate (FLONASE) 50 mcg/actuation nasal spray SPRAY TWO SPRAYS IN EACH NOSTRIL ONCE DAILY AS NEEDED FOR ALLERGIES 1 Bottle 5    omeprazole (PRILOSEC) 40 mg capsule TAKE ONE CAPSULE BY MOUTH DAILY 90 Capsule 3    pravastatin (PRAVACHOL) 20 mg tablet TAKE TWO TABLETS BY MOUTH ONCE NIGHTLY 180 Tab 3    losartan-hydroCHLOROthiazide (HYZAAR) 50-12.5 mg per tablet TAKE ONE TABLET BY MOUTH DAILY 90 Tab 3    diclofenac (Voltaren) 1 % gel Apply topically to affected area daily as needed      loratadine (CLARITIN) 10 mg tablet Take 10 mg by mouth daily.  ALPRAZolam (XANAX) 0.5 mg tablet TAKE ONE TABLET BY MOUTH AT NIGHTLY AS NEEDED 90 Tab 0    melatonin 3 mg tablet Take 3 mg by mouth nightly as needed.  acetaminophen (TYLENOL ARTHRITIS PAIN) 650 mg CR tablet Take 1,300 mg by mouth every six (6) hours as needed for Pain.  CALCIUM CARB/VIT D3/MINERALS (CALTRATE PLUS PO) Take 1 Tab by mouth daily.          Allergies   Allergen Reactions    Ciprofloxacin Other (comments)     Insomnia and hot flashes    Demerol [Meperidine] Other (comments)     hallucinations    Doxycycline Unknown (comments)       \"Feeling flushed\"    Floxin [Ofloxacin] Other (comments)     insomnia    Lisinopril Cough    Phenergan-Codeine [Promethazine-Codeine] Itching     Patient stated she started itching when she was tapering off of medication.     Prednisone Other (comments)     Has feelings of depression, \"weird thoughts\"       Past Medical History:   Diagnosis Date    C section 3/12/2010    Depression 3/12/2010    Diabetes ([address]     Environmental allergies 3/12/2010    GERD (gastroesophageal reflux disease)     H/O: hysterectomy 3/12/2010    High cholesterol 3/12/2010    HTN (hypertension) 3/12/2010    Pneumonia 2017       Past Surgical History:   Procedure Laterality Date    COLONOSCOPY N/A 7/15/2020    COLONOSCOPY performed by Akil Osullivan MD at [facility] ENDOSCOPY    ENDOSCOPY, COLON, DIAGNOSTIC      repeat 10 yrs (Dr. Alberto Chandler)    HX BREAST BIOPSY  1980    bilateral negative    HX BREAST BIOPSY  1986    left negative    HX  SECTION      x 3    HX CORNEAL TRANSPLANT  2011    left eye-     HX CYSTOSTOMY  3/2015    HX HYSTERECTOMY  1998       Family History   Problem Relation Age of Onset    Hypertension Mother     Other Mother         obesity, chf    Heart Disease Mother         CHF    Kidney Disease Mother     High Cholesterol Mother     Cancer Father         lung    Breast Cancer Sister     Mult Sclerosis Sister     High Cholesterol Sister     Hypertension Sister     Cancer Sister         breast    Hypertension Brother     Liver Disease Brother         Hep C    High Cholesterol Brother     Cancer Brother         colon    Hypertension Sister     Diabetes Sister     High Cholesterol Sister     Hypertension Sister     Other Sister         obese    High Cholesterol Sister     Cancer Niece         cervical       Social History     Tobacco Use    Smoking status: Never Smoker    Smokeless tobacco: Never Used   Substance Use Topics    Alcohol use: Yes     Alcohol/week: 0.0 standard drinks     Comment: occasional        Lab Results   Component Value Date/Time    WBC 8.7 02/15/2022 09:23 AM    HGB 13.3 02/15/2022 09:23 AM    Hemoglobin (POC) 15.0 09/29/2016 05:26 PM    HCT 43.8 02/15/2022 09:23 AM    Hematocrit (POC) 44 09/29/2016 05:26 PM    PLATELET 269 02/31/0358 09:23 AM    MCV 94.2 02/15/2022 09:23 AM     Lab Results   Component Value Date/Time    Cholesterol, total 181 02/15/2022 09:23 AM    HDL Cholesterol 47 02/15/2022 09:23 AM    LDL, calculated 107 (H) 02/15/2022 09:23 AM    Triglyceride 135 02/15/2022 09:23 AM    CHOL/HDL Ratio 3.9 02/15/2022 09:23 AM     Lab Results   Component Value Date/Time    TSH 2.450 06/04/2019 01:29 PM      Lab Results   Component Value Date/Time    Sodium 138 02/15/2022 09:23 AM    Potassium 4.2 02/15/2022 09:23 AM    Chloride 106 02/15/2022 09:23 AM    CO2 32 02/15/2022 09:23 AM    Anion gap 0 (L) 02/15/2022 09:23 AM    Glucose 123 (H) 02/15/2022 09:23 AM    BUN 15 02/15/2022 09:23 AM    Creatinine 0.77 02/15/2022 09:23 AM    BUN/Creatinine ratio 19 02/15/2022 09:23 AM    GFR est AA >60 02/15/2022 09:23 AM    GFR est non-AA >60 02/15/2022 09:23 AM    Calcium 9.7 02/15/2022 09:23 AM    Bilirubin, total 0.3 01/29/2022 01:00 AM    ALT (SGPT) 20 01/29/2022 01:00 AM    Alk. phosphatase 77 01/29/2022 01:00 AM    Protein, total 7.2 01/29/2022 01:00 AM    Albumin 3.9 01/29/2022 01:00 AM    Globulin 3.3 01/29/2022 01:00 AM    A-G Ratio 1.2 01/29/2022 01:00 AM      Lab Results   Component Value Date/Time    Hemoglobin A1c 5.9 (H) 01/09/2021 08:58 AM    Hemoglobin A1c (POC) 6.3 02/15/2022 09:13 AM         Review of Systems   Constitutional: Negative for malaise/fatigue. HENT: Negative for congestion. Eyes: Negative for blurred vision. Respiratory: Negative for cough and shortness of breath.     Cardiovascular: Negative for chest pain, palpitations and leg swelling. Gastrointestinal: Negative for abdominal pain, constipation and heartburn. Genitourinary: Negative for dysuria, frequency and urgency. Musculoskeletal: Negative for back pain and joint pain. Neurological: Negative for dizziness, tingling and headaches. Endo/Heme/Allergies: Negative for environmental allergies. Psychiatric/Behavioral: Negative for depression. The patient does not have insomnia. Physical Exam  Vitals and nursing note reviewed. Constitutional:       Appearance: Normal appearance. She is well-developed. Comments: BP (!) 140/70   Pulse 72   Temp 97.8 °F (36.6 °C) (Oral)   Resp 14   Ht 5' 6\" (1.676 m)   Wt 260 lb (117.9 kg)   LMP 11/19/1998   SpO2 97%   BMI 41.97 kg/m²      Neck:      Thyroid: No thyromegaly. Cardiovascular:      Rate and Rhythm: Normal rate and regular rhythm. Heart sounds: Normal heart sounds. No gallop. Pulmonary:      Effort: Pulmonary effort is normal.      Breath sounds: Normal breath sounds. Musculoskeletal:      Right lower leg: No edema. Left lower leg: No edema. Neurological:      Mental Status: She is alert. ASSESSMENT and PLAN  Diagnoses and all orders for this visit:    1. Type 2 diabetes mellitus without complication, without long-term current use of insulin ([facility])  FBS 97. Continue to monitor. Work on diet and exercise. Will continue with Ozempic at 0.5mg weekly for now. -     ondansetron (ZOFRAN ODT) 4 mg disintegrating tablet; Take 1 Tablet by mouth every eight (8) hours as needed for Nausea or Vomiting.  -     AMB POC GLUCOSE, QUANTITATIVE, BLOOD  -     METABOLIC PANEL, BASIC; Future  -     lancets misc; Monitor BG daily  Dx: E11.9. Provide with lancets insurance covers  -     Blood-Glucose Meter monitoring kit; Monitor BG daily  Dx: E11.9.  Provide with glucometer insurance covers  -     glucose blood VI test strips (blood glucose test) strip; Monitor BG daily  Dx: E11.9  Provide with test strips insurance covers    2. Encounter for medication monitoring      Follow-up and Dispositions    · Return in about 6 weeks (around 5/17/2022).        reviewed diet, exercise and weight control  cardiovascular risk and specific lipid/LDL goals reviewed  reviewed medications and side effects in detail  specific diabetic recommendations: low cholesterol diet, weight control and daily exercise discussed and glycohemoglobin and other lab monitoring discussed

## 2022-04-06 LAB
ANION GAP SERPL CALC-SCNC: 3 MMOL/L (ref 5–15)
BUN SERPL-MCNC: 14 MG/DL (ref 6–20)
BUN/CREAT SERPL: 16 (ref 12–20)
CALCIUM SERPL-MCNC: 9.9 MG/DL (ref 8.5–10.1)
CHLORIDE SERPL-SCNC: 102 MMOL/L (ref 97–108)
CO2 SERPL-SCNC: 31 MMOL/L (ref 21–32)
CREAT SERPL-MCNC: 0.85 MG/DL (ref 0.55–1.02)
GLUCOSE SERPL-MCNC: 88 MG/DL (ref 65–100)
POTASSIUM SERPL-SCNC: 4.5 MMOL/L (ref 3.5–5.1)
SODIUM SERPL-SCNC: 136 MMOL/L (ref 136–145)

## 2022-04-16 DIAGNOSIS — I10 ESSENTIAL HYPERTENSION: ICD-10-CM

## 2022-04-16 DIAGNOSIS — E78.2 MIXED HYPERLIPIDEMIA: ICD-10-CM

## 2022-04-18 RX ORDER — LOSARTAN POTASSIUM AND HYDROCHLOROTHIAZIDE 12.5; 5 MG/1; MG/1
TABLET ORAL
Qty: 90 TABLET | Refills: 3 | Status: SHIPPED | OUTPATIENT
Start: 2022-04-18

## 2022-04-18 RX ORDER — PRAVASTATIN SODIUM 20 MG/1
TABLET ORAL
Qty: 180 TABLET | Refills: 3 | Status: SHIPPED | OUTPATIENT
Start: 2022-04-18

## 2022-04-22 ENCOUNTER — HOSPITAL ENCOUNTER (OUTPATIENT)
Dept: MAMMOGRAPHY | Age: 67
Discharge: HOME | End: 2022-04-22
Attending: FAMILY MEDICINE
Payer: MEDICARE

## 2022-04-22 DIAGNOSIS — Z12.31 SCREENING MAMMOGRAM FOR HIGH-RISK PATIENT: ICD-10-CM

## 2022-04-22 PROCEDURE — 77063 BREAST TOMOSYNTHESIS BI: CPT

## 2022-05-25 ENCOUNTER — OFFICE VISIT (OUTPATIENT)
Dept: FAMILY MEDICINE CLINIC | Age: 67
End: 2022-05-25
Payer: MEDICARE

## 2022-05-25 VITALS
RESPIRATION RATE: 16 BRPM | WEIGHT: 256.2 LBS | HEIGHT: 66 IN | OXYGEN SATURATION: 97 % | HEART RATE: 71 BPM | TEMPERATURE: 98.2 F | DIASTOLIC BLOOD PRESSURE: 77 MMHG | BODY MASS INDEX: 41.17 KG/M2 | SYSTOLIC BLOOD PRESSURE: 138 MMHG

## 2022-05-25 DIAGNOSIS — K59.00 CONSTIPATION, UNSPECIFIED CONSTIPATION TYPE: ICD-10-CM

## 2022-05-25 DIAGNOSIS — Z80.0 FAMILY HISTORY OF COLON CANCER: ICD-10-CM

## 2022-05-25 DIAGNOSIS — K92.1 BLOOD IN STOOL: Primary | ICD-10-CM

## 2022-05-25 DIAGNOSIS — Z87.898 HISTORY OF DIARRHEA: ICD-10-CM

## 2022-05-25 DIAGNOSIS — Z86.010 HISTORY OF COLON POLYPS: ICD-10-CM

## 2022-05-25 PROBLEM — E11.40 TYPE 2 DIABETES MELLITUS WITH DIABETIC NEUROPATHY (HCC): Status: RESOLVED | Noted: 2020-06-17 | Resolved: 2022-05-25

## 2022-05-25 PROCEDURE — 1123F ACP DISCUSS/DSCN MKR DOCD: CPT | Performed by: FAMILY MEDICINE

## 2022-05-25 PROCEDURE — 1090F PRES/ABSN URINE INCON ASSESS: CPT | Performed by: FAMILY MEDICINE

## 2022-05-25 PROCEDURE — G9899 SCRN MAM PERF RSLTS DOC: HCPCS | Performed by: FAMILY MEDICINE

## 2022-05-25 PROCEDURE — G8754 DIAS BP LESS 90: HCPCS | Performed by: FAMILY MEDICINE

## 2022-05-25 PROCEDURE — G8399 PT W/DXA RESULTS DOCUMENT: HCPCS | Performed by: FAMILY MEDICINE

## 2022-05-25 PROCEDURE — 3017F COLORECTAL CA SCREEN DOC REV: CPT | Performed by: FAMILY MEDICINE

## 2022-05-25 PROCEDURE — G0463 HOSPITAL OUTPT CLINIC VISIT: HCPCS | Performed by: FAMILY MEDICINE

## 2022-05-25 PROCEDURE — G8427 DOCREV CUR MEDS BY ELIG CLIN: HCPCS | Performed by: FAMILY MEDICINE

## 2022-05-25 PROCEDURE — G9717 DOC PT DX DEP/BP F/U NT REQ: HCPCS | Performed by: FAMILY MEDICINE

## 2022-05-25 PROCEDURE — G8752 SYS BP LESS 140: HCPCS | Performed by: FAMILY MEDICINE

## 2022-05-25 PROCEDURE — G8536 NO DOC ELDER MAL SCRN: HCPCS | Performed by: FAMILY MEDICINE

## 2022-05-25 PROCEDURE — 1101F PT FALLS ASSESS-DOCD LE1/YR: CPT | Performed by: FAMILY MEDICINE

## 2022-05-25 PROCEDURE — 99213 OFFICE O/P EST LOW 20 MIN: CPT | Performed by: FAMILY MEDICINE

## 2022-05-25 PROCEDURE — G8417 CALC BMI ABV UP PARAM F/U: HCPCS | Performed by: FAMILY MEDICINE

## 2022-05-25 RX ORDER — PSYLLIUM HUSK 3.4 G/5.8G
POWDER ORAL
Qty: 283 G | Refills: 0
Start: 2022-05-25

## 2022-05-25 NOTE — PROGRESS NOTES
Chief Complaint   Patient presents with    Diarrhea     pt c/o having diarrhea week of 5/12/2022 and had seen some blood on toile tissue and small amount of blood in toilet- no more diarrhea since 5/19/2022    Constipation     pt now c/o having problems with having a bowel movement           A1C 2/15/2022     Microalbumin 2/15/2022    Mammogram 4/22/2022    Colonoscopy 7/15/2020 by Dr. Radha Tao repeat in 4 years. Eye exam 10/12/2020 with Dr. Ghada Lieberman. 1. Have you been to the ER, urgent care clinic since your last visit? Hospitalized since your last visit? No    2. Have you seen or consulted any other health care providers outside of the [address] [place] [address] since your last visit? Include any pap smears or colon screening.  No

## 2022-05-25 NOTE — PROGRESS NOTES
HISTORY OF PRESENT ILLNESS  Ranjit Montoya is a 75 y.o. female. HPI   Started about 2 weeks ago with rectal pain and burning and was noted to be constipated. Then started with diarrhea about 22. Thinks that it was triggered by something that she ate. Then noted blood on the toilet tissues and in the toilet water on that  and Monday one time on each day. No rectal pain or abd pain noted. Stomach just felt like \"she had gas\". Had 3 to 4 loose stools per day over 4 to 5 days. No nausea or vomiting. No fever or chills. Slowed down her diet d/t the diarrhea which cleared up after a few days. Now back to feeling more constipated. No more blood noted in the stool. No abd pain. Has hx on polyp on her last colonoscopy in . Was also noted to have internal hemorrhoid. Also has Fh of colon cancer in her  brother. Patient Active Problem List   Diagnosis Code    Environmental allergies Z91.09    Depression F32. A    C section     H/O: hysterectomy Z90.710    Hypovitaminosis D E55.9    Mixed hyperlipidemia E78.2    Benign essential hematuria N02.9    Diet-controlled diabetes mellitus (HCC) E11.9    Hypertensive retinopathy H35.039    Encounter for medication monitoring Z51.81    At high risk for breast cancer Z91.89    Obesity, morbid (HCC) E66.01    FH: colon cancer Z80.0       Current Outpatient Medications   Medication Sig Dispense Refill    pravastatin (PRAVACHOL) 20 mg tablet TAKE TWO TABLETS BY MOUTH ONCE NIGHTLY 180 Tablet 3    losartan-hydroCHLOROthiazide (HYZAAR) 50-12.5 mg per tablet TAKE ONE TABLET BY MOUTH DAILY 90 Tablet 3    semaglutide (Ozempic) 0.25 mg or 0.5 mg/dose (2 mg/1.5 ml) subq pen 0.5 mg by SubCUTAneous route every seven (7) days.  cyanocobalamin (Vitamin B-12) 1,000 mcg tablet Take 1,000 mcg by mouth daily.  ondansetron (ZOFRAN ODT) 4 mg disintegrating tablet Take 1 Tablet by mouth every eight (8) hours as needed for Nausea or Vomiting. 20 Tablet 0    lancets misc Monitor BG daily  Dx: E11.9. Provide with lancets insurance covers 100 Each 3    Blood-Glucose Meter monitoring kit Monitor BG daily  Dx: E11.9. Provide with glucometer insurance covers 1 Kit 0    glucose blood VI test strips (blood glucose test) strip Monitor BG daily  Dx: E11.9  Provide with test strips insurance covers 100 Strip 3    OTHER Take 1 Tablet by mouth daily. Elderberry/VitaminC/Zinc/Vitamin D3 combination product      levocetirizine (XYZAL) 5 mg tablet Take 1 Tablet by mouth daily. 90 Tablet 3    albuterol (ProAir HFA) 90 mcg/actuation inhaler Take 2 Puffs by inhalation every four (4) hours as needed for Wheezing or Shortness of Breath. Please instruct Pt on how to use an inhaler. 1 Each 3    citalopram (CELEXA) 20 mg tablet Take 1 Tablet by mouth daily. 90 Tablet 3    furosemide (LASIX) 20 mg tablet TAKE ONE TABLET BY MOUTH DAILY 90 Tablet 1    triamcinolone acetonide (KENALOG) 0.1 % topical cream APPLY TO AFFECTED AREA(S) TWO TIMES A DAY AS NEEDED FOR SKIN IRRITATION, USE A THIN LAYER TO RASH ON HER NECK 15 g 0    terbinafine HCL (LAMISIL) 1 % topical cream Apply  to affected area two (2) times daily as needed. 30 g 0    fluticasone propionate (FLONASE) 50 mcg/actuation nasal spray SPRAY TWO SPRAYS IN EACH NOSTRIL ONCE DAILY AS NEEDED FOR ALLERGIES 1 Bottle 5    omeprazole (PRILOSEC) 40 mg capsule TAKE ONE CAPSULE BY MOUTH DAILY 90 Capsule 3    diclofenac (Voltaren) 1 % gel Apply topically to affected area daily as needed      loratadine (CLARITIN) 10 mg tablet Take 10 mg by mouth daily.  ALPRAZolam (XANAX) 0.5 mg tablet TAKE ONE TABLET BY MOUTH AT NIGHTLY AS NEEDED 90 Tab 0    melatonin 3 mg tablet Take 3 mg by mouth nightly as needed.  acetaminophen (TYLENOL ARTHRITIS PAIN) 650 mg CR tablet Take 1,300 mg by mouth every six (6) hours as needed for Pain.  CALCIUM CARB/VIT D3/MINERALS (CALTRATE PLUS PO) Take 1 Tab by mouth daily. Allergies   Allergen Reactions    Ciprofloxacin Other (comments)     Insomnia and hot flashes    Demerol [Meperidine] Other (comments)     hallucinations    Doxycycline Unknown (comments)       \"Feeling flushed\"    Floxin [Ofloxacin] Other (comments)     insomnia    Lisinopril Cough    Phenergan-Codeine [Promethazine-Codeine] Itching     Patient stated she started itching when she was tapering off of medication.     Prednisone Other (comments)     Has feelings of depression, \"weird thoughts\"       Past Medical History:   Diagnosis Date    C section 3/12/2010    Depression 3/12/2010    Diabetes ([address]     Environmental allergies 3/12/2010    GERD (gastroesophageal reflux disease)     H/O: hysterectomy 3/12/2010    High cholesterol 3/12/2010    HTN (hypertension) 3/12/2010    Pneumonia 2017       Past Surgical History:   Procedure Laterality Date    COLONOSCOPY N/A 7/15/2020    [facility]OSCOPY    ENDOSCOPY, COLON, DIAGNOSTIC      repeat 10 yrs (Dr. Gala Acosta)    HX BREAST BIOPSY  1980    bilateral negative    HX BREAST BIOPSY  1986    left negative    HX  SECTION      x 3    HX CORNEAL TRANSPLANT  2011    left eye-     HX CYSTOSTOMY  3/2015    HX HYSTERECTOMY  1998       Family History   Problem Relation Age of Onset    Hypertension Mother     Other Mother         obesity, chf    Heart Disease Mother         CHF    Kidney Disease Mother     High Cholesterol Mother     Cancer Father         lung    Breast Cancer Sister     Mult Sclerosis Sister     High Cholesterol Sister     Hypertension Sister     Cancer Sister         breast    Hypertension Brother     Liver Disease Brother         Hep C    High Cholesterol Brother     Cancer Brother         colon    Hypertension Sister     Diabetes Sister     High Cholesterol Sister     Hypertension Sister     Other Sister         obese    High Cholesterol Sister     Cancer Niece         cervical       Social History     Tobacco Use    Smoking status: Never Smoker    Smokeless tobacco: Never Used   Substance Use Topics    Alcohol use: Yes     Alcohol/week: 0.0 standard drinks     Comment: occasional     Lab Results   Component Value Date/Time    WBC 8.7 02/15/2022 09:23 AM    HGB 13.3 02/15/2022 09:23 AM    Hemoglobin (POC) 15.0 09/29/2016 05:26 PM    HCT 43.8 02/15/2022 09:23 AM    Hematocrit (POC) 44 09/29/2016 05:26 PM    PLATELET 688 48/97/1623 09:23 AM    MCV 94.2 02/15/2022 09:23 AM     Lab Results   Component Value Date/Time    Cholesterol, total 181 02/15/2022 09:23 AM    HDL Cholesterol 47 02/15/2022 09:23 AM    LDL, calculated 107 (H) 02/15/2022 09:23 AM    Triglyceride 135 02/15/2022 09:23 AM    CHOL/HDL Ratio 3.9 02/15/2022 09:23 AM     Lab Results   Component Value Date/Time    TSH 2.450 06/04/2019 01:29 PM      Lab Results   Component Value Date/Time    Sodium 136 04/05/2022 12:36 PM    Potassium 4.5 04/05/2022 12:36 PM    Chloride 102 04/05/2022 12:36 PM    CO2 31 04/05/2022 12:36 PM    Anion gap 3 (L) 04/05/2022 12:36 PM    Glucose 88 04/05/2022 12:36 PM    BUN 14 04/05/2022 12:36 PM    Creatinine 0.85 04/05/2022 12:36 PM    BUN/Creatinine ratio 16 04/05/2022 12:36 PM    GFR est AA >60 04/05/2022 12:36 PM    GFR est non-AA >60 04/05/2022 12:36 PM    Calcium 9.9 04/05/2022 12:36 PM    Bilirubin, total 0.3 01/29/2022 01:00 AM    ALT (SGPT) 20 01/29/2022 01:00 AM    Alk. phosphatase 77 01/29/2022 01:00 AM    Protein, total 7.2 01/29/2022 01:00 AM    Albumin 3.9 01/29/2022 01:00 AM    Globulin 3.3 01/29/2022 01:00 AM    A-G Ratio 1.2 01/29/2022 01:00 AM      Lab Results   Component Value Date/Time    Hemoglobin A1c 5.9 (H) 01/09/2021 08:58 AM    Hemoglobin A1c (POC) 6.3 02/15/2022 09:13 AM            Review of Systems   Constitutional: Negative for malaise/fatigue. HENT: Negative for congestion. Eyes: Negative for blurred vision. Respiratory: Negative for cough and shortness of breath. Cardiovascular: Negative for chest pain, palpitations and leg swelling. Gastrointestinal: Positive for blood in stool and constipation. Negative for abdominal pain, heartburn, melena, nausea and vomiting. Genitourinary: Negative for dysuria, frequency and urgency. Musculoskeletal: Negative for back pain and joint pain. Neurological: Negative for dizziness, tingling and headaches. Endo/Heme/Allergies: Negative for environmental allergies. Psychiatric/Behavioral: Negative for depression. The patient does not have insomnia. Physical Exam  Vitals and nursing note reviewed. Constitutional:       Appearance: Normal appearance. She is well-developed. Comments: /77 (BP 1 Location: Left arm, BP Patient Position: Sitting)   Pulse 71   Temp 98.2 °F (36.8 °C) (Oral)   Resp 16   Ht 5' 6\" (1.676 m)   Wt 256 lb 3.2 oz (116.2 kg)   LMP 11/19/1998   SpO2 97%   BMI 41.35 kg/m²    HENT:      Right Ear: Tympanic membrane and ear canal normal.      Left Ear: Tympanic membrane and ear canal normal.   Neck:      Thyroid: No thyromegaly. Cardiovascular:      Rate and Rhythm: Normal rate and regular rhythm. Heart sounds: Normal heart sounds. No gallop. Pulmonary:      Effort: Pulmonary effort is normal.      Breath sounds: Normal breath sounds. Abdominal:      General: Abdomen is flat. Bowel sounds are normal. There is no distension. Palpations: Abdomen is soft. There is no mass. Tenderness: There is no abdominal tenderness. There is no guarding. Genitourinary:     Rectum: Guaiac result negative. Musculoskeletal:         General: Normal range of motion. Cervical back: Normal range of motion and neck supple. Right lower leg: No edema. Left lower leg: No edema. Lymphadenopathy:      Cervical: No cervical adenopathy. Skin:     General: Skin is warm and dry.    Neurological:      General: No focal deficit present. Mental Status: She is alert and oriented to person, place, and time. Psychiatric:         Mood and Affect: Mood normal.         ASSESSMENT and PLAN  Diagnoses and all orders for this visit:    1. Blood in stool  -     CBC W/O DIFF; Future  -     REFERRAL TO GASTROENTEROLOGY    2. History of diarrhea  Resolved. 3. Constipation, unspecified constipation type  -     Add Psyllium Husk-Aspartame (Metamucil Sugar-Free, aspart,) 3.4 gram/5.8 gram powd; Take daily. 4. History of colon polyps  5. Family history of colon cancer  -     REFERRAL TO GASTROENTEROLOGY      I have discussed diagnosis listed in this note with pt and/or family. I have discussed treatment plans and options and the risk/benefit analysis of those options, including safe use of medications and possible medication side effects. Through the use of shared decision making we have agreed to the above plan. The patient has received an after-visit summary and questions were answered concerning future plans and follow up. Advise pt of any urgent changes then to proceed to the ER.

## 2022-05-26 ENCOUNTER — TELEPHONE (OUTPATIENT)
Dept: FAMILY MEDICINE CLINIC | Age: 67
End: 2022-05-26

## 2022-05-26 LAB
ERYTHROCYTE [DISTWIDTH] IN BLOOD BY AUTOMATED COUNT: 14.1 % (ref 11.5–14.5)
HCT VFR BLD AUTO: 44.8 % (ref 35–47)
HGB BLD-MCNC: 14.2 G/DL (ref 11.5–16)
MCH RBC QN AUTO: 29.5 PG (ref 26–34)
MCHC RBC AUTO-ENTMCNC: 31.7 G/DL (ref 30–36.5)
MCV RBC AUTO: 92.9 FL (ref 80–99)
NRBC # BLD: 0 K/UL (ref 0–0.01)
NRBC BLD-RTO: 0 PER 100 WBC
PLATELET # BLD AUTO: 347 K/UL (ref 150–400)
PMV BLD AUTO: 10.4 FL (ref 8.9–12.9)
RBC # BLD AUTO: 4.82 M/UL (ref 3.8–5.2)
WBC # BLD AUTO: 14.9 K/UL (ref 3.6–11)

## 2022-05-26 NOTE — TELEPHONE ENCOUNTER
WBC is elevated. She is having increased sinus congestion. No cough or fever or chills. Treat with flonase and claritin or zyrtec.

## 2022-06-08 ENCOUNTER — TELEPHONE (OUTPATIENT)
Dept: SLEEP MEDICINE | Age: 67
End: 2022-06-08

## 2022-06-08 NOTE — TELEPHONE ENCOUNTER
LUÍS on 06/08/2022 at 9:45am returning patient's call into the office to reschedule her Np consult with Dr. Renetta Balderas.

## 2022-06-13 RX ORDER — OMEPRAZOLE 40 MG/1
40 CAPSULE, DELAYED RELEASE ORAL DAILY
Qty: 90 CAPSULE | Refills: 3 | Status: SHIPPED | OUTPATIENT
Start: 2022-06-13

## 2022-06-22 ENCOUNTER — TELEPHONE (OUTPATIENT)
Dept: FAMILY MEDICINE CLINIC | Age: 67
End: 2022-06-22

## 2022-06-22 NOTE — TELEPHONE ENCOUNTER
----- Message from Franchesca Robertson sent at 6/21/2022 10:57 AM EDT -----  Subject: Message to Provider    QUESTIONS  Information for Provider? PT. states her symptoms from her May 25th visit   has improved and wants to know if she should keep the appt. for 6/28/2022   or cancel and just schedule her AWV in July. Please call pt. back to   advise.   ---------------------------------------------------------------------------  --------------  CALL BACK INFO  What is the best way for the office to contact you? OK to leave message on   voicemail  Preferred Call Back Phone Number? [phone]  ---------------------------------------------------------------------------  --------------  SCRIPT ANSWERS  Relationship to Patient?  Self

## 2022-07-11 DIAGNOSIS — E11.9 TYPE 2 DIABETES MELLITUS WITHOUT COMPLICATION, WITHOUT LONG-TERM CURRENT USE OF INSULIN (HCC): ICD-10-CM

## 2022-07-11 RX ORDER — SEMAGLUTIDE 1.34 MG/ML
0.5 INJECTION, SOLUTION SUBCUTANEOUS
Qty: 1 BOX | Refills: 1 | Status: SHIPPED | OUTPATIENT
Start: 2022-07-11 | End: 2022-09-08 | Stop reason: SDUPTHER

## 2022-07-11 NOTE — TELEPHONE ENCOUNTER
Last visit:22  Next visit:22  Previous refill 22no quantity or sig listed)    Requested Prescriptions     Pending Prescriptions Disp Refills    semaglutide (Ozempic) 0.25 mg or 0.5 mg/dose (2 mg/1.5 ml) subq pen 1 Box 1     Si.5 mg by SubCUTAneous route every seven (7) days.        For Pharmacy Admin Tracking Only     Intervention Detail: New Rx: 1, reason: Patient Preference   Time Spent (min): 5

## 2022-07-19 ENCOUNTER — OFFICE VISIT (OUTPATIENT)
Dept: FAMILY MEDICINE CLINIC | Age: 67
End: 2022-07-19
Payer: MEDICARE

## 2022-07-19 VITALS
SYSTOLIC BLOOD PRESSURE: 130 MMHG | RESPIRATION RATE: 18 BRPM | HEIGHT: 66 IN | BODY MASS INDEX: 41.17 KG/M2 | HEART RATE: 76 BPM | DIASTOLIC BLOOD PRESSURE: 62 MMHG | WEIGHT: 256.2 LBS | OXYGEN SATURATION: 98 % | TEMPERATURE: 99.1 F

## 2022-07-19 DIAGNOSIS — B37.2 CANDIDAL DERMATITIS: Primary | ICD-10-CM

## 2022-07-19 DIAGNOSIS — L24.9 IRRITANT DERMATITIS: ICD-10-CM

## 2022-07-19 PROCEDURE — G8536 NO DOC ELDER MAL SCRN: HCPCS | Performed by: FAMILY MEDICINE

## 2022-07-19 PROCEDURE — G8399 PT W/DXA RESULTS DOCUMENT: HCPCS | Performed by: FAMILY MEDICINE

## 2022-07-19 PROCEDURE — G8752 SYS BP LESS 140: HCPCS | Performed by: FAMILY MEDICINE

## 2022-07-19 PROCEDURE — G9717 DOC PT DX DEP/BP F/U NT REQ: HCPCS | Performed by: FAMILY MEDICINE

## 2022-07-19 PROCEDURE — 1101F PT FALLS ASSESS-DOCD LE1/YR: CPT | Performed by: FAMILY MEDICINE

## 2022-07-19 PROCEDURE — G9899 SCRN MAM PERF RSLTS DOC: HCPCS | Performed by: FAMILY MEDICINE

## 2022-07-19 PROCEDURE — 99212 OFFICE O/P EST SF 10 MIN: CPT | Performed by: FAMILY MEDICINE

## 2022-07-19 PROCEDURE — 1123F ACP DISCUSS/DSCN MKR DOCD: CPT | Performed by: FAMILY MEDICINE

## 2022-07-19 PROCEDURE — 1090F PRES/ABSN URINE INCON ASSESS: CPT | Performed by: FAMILY MEDICINE

## 2022-07-19 PROCEDURE — G8754 DIAS BP LESS 90: HCPCS | Performed by: FAMILY MEDICINE

## 2022-07-19 PROCEDURE — G8427 DOCREV CUR MEDS BY ELIG CLIN: HCPCS | Performed by: FAMILY MEDICINE

## 2022-07-19 PROCEDURE — G8417 CALC BMI ABV UP PARAM F/U: HCPCS | Performed by: FAMILY MEDICINE

## 2022-07-19 PROCEDURE — G0463 HOSPITAL OUTPT CLINIC VISIT: HCPCS | Performed by: FAMILY MEDICINE

## 2022-07-19 PROCEDURE — 3017F COLORECTAL CA SCREEN DOC REV: CPT | Performed by: FAMILY MEDICINE

## 2022-07-19 RX ORDER — NYSTATIN 100000 [USP'U]/G
POWDER TOPICAL
Qty: 60 G | Refills: 1 | Status: SHIPPED | OUTPATIENT
Start: 2022-07-19

## 2022-07-19 RX ORDER — KETOCONAZOLE 20 MG/G
CREAM TOPICAL 2 TIMES DAILY
Qty: 30 G | Refills: 0 | Status: SHIPPED | OUTPATIENT
Start: 2022-07-19 | End: 2022-07-19 | Stop reason: SDUPTHER

## 2022-07-19 RX ORDER — KETOCONAZOLE 20 MG/G
CREAM TOPICAL 2 TIMES DAILY
Qty: 30 G | Refills: 0 | Status: SHIPPED | OUTPATIENT
Start: 2022-07-19 | End: 2022-09-02 | Stop reason: ALTCHOICE

## 2022-07-19 NOTE — PROGRESS NOTES
Identified pt with two pt identifiers(name and ). Reviewed record in preparation for visit and have obtained necessary documentation. Chief Complaint   Patient presents with    Rash     under breasts and behind ears        Health Maintenance Due   Topic    Medicare Yearly Exam      Vitals:    22 1248   BP: 130/62   Pulse: 76   Resp: 18   Temp: 99.1 °F (37.3 °C)   TempSrc: Oral   SpO2: 98%   Weight: 256 lb 3.2 oz (116.2 kg)   Height: 5' 6\" (1.676 m)   PainSc:   0 - No pain   LMP: 1998       Pain Scale: 0 - No pain/10        Coordination of Care Questionnaire:  :     1. Have you been to the ER, urgent care clinic since your last visit? Hospitalized since your last visit? No    2. Have you seen or consulted any other health care providers outside of the [address] [place] [address] since your last visit? Include any pap smears or colon screening. No    3. For patients aged 39-70: Has the patient had a colonoscopy / FIT/ Cologuard? Yes - no Care Gap present      If the patient is female:    4. For patients aged 41-77: Has the patient had a mammogram within the past 2 years? Yes - no Care Gap present      5. For patients aged 21-65: Has the patient had a pap smear?  NA - based on age or sex

## 2022-07-19 NOTE — PROGRESS NOTES
HISTORY OF PRESENT ILLNESS  Hoda Balbuena is a 75 y.o. female. HPI   Rash under both breasts over the past few weeks. Has had increased itching. Admits that she does sweat under both breasts. Also has rash on the neck behind the right ear that has been more irritated. Has been using HC cream on the rash on the neck which has been helping some. Patient Active Problem List   Diagnosis Code    Environmental allergies Z91.09    Depression F32. A    C section     H/O: hysterectomy Z90.710    Hypovitaminosis D E55.9    Mixed hyperlipidemia E78.2    Benign essential hematuria N02.9    Diet-controlled diabetes mellitus (HCC) E11.9    Hypertensive retinopathy H35.039    Encounter for medication monitoring Z51.81    At high risk for breast cancer Z91.89    Obesity, morbid (HCC) E66.01    FH: colon cancer Z80.0       Current Outpatient Medications   Medication Sig Dispense Refill    ketoconazole (NIZORAL) 2 % topical cream Apply  to affected area two (2) times a day. Apply to rash under breasts until healed. 30 g 0    nystatin (MYCOSTATIN) powder Apply  to affected area two (2) times daily as needed (rash under breasts). 60 g 1    semaglutide (Ozempic) 0.25 mg or 0.5 mg/dose (2 mg/1.5 ml) subq pen 0.5 mg by SubCUTAneous route every seven (7) days. 1 Box 1    omeprazole (PRILOSEC) 40 mg capsule Take 1 Capsule by mouth daily. 90 Capsule 3    Psyllium Husk-Aspartame (Metamucil Sugar-Free, aspart,) 3.4 gram/5.8 gram powd Take daily. 283 g 0    pravastatin (PRAVACHOL) 20 mg tablet TAKE TWO TABLETS BY MOUTH ONCE NIGHTLY 180 Tablet 3    losartan-hydroCHLOROthiazide (HYZAAR) 50-12.5 mg per tablet TAKE ONE TABLET BY MOUTH DAILY 90 Tablet 3    cyanocobalamin (Vitamin B-12) 1,000 mcg tablet Take 1,000 mcg by mouth daily.  lancets misc Monitor BG daily  Dx: E11.9. Provide with lancets insurance covers 100 Each 3    Blood-Glucose Meter monitoring kit Monitor BG daily  Dx: E11.9.  Provide with glucometer insurance covers 1 Kit 0    glucose blood VI test strips (blood glucose test) strip Monitor BG daily  Dx: E11.9  Provide with test strips insurance covers 100 Strip 3    OTHER Take 1 Tablet by mouth daily. Elderberry/VitaminC/Zinc/Vitamin D3 combination product      levocetirizine (XYZAL) 5 mg tablet Take 1 Tablet by mouth daily. 90 Tablet 3    albuterol (ProAir HFA) 90 mcg/actuation inhaler Take 2 Puffs by inhalation every four (4) hours as needed for Wheezing or Shortness of Breath. Please instruct Pt on how to use an inhaler. 1 Each 3    citalopram (CELEXA) 20 mg tablet Take 1 Tablet by mouth daily. 90 Tablet 3    furosemide (LASIX) 20 mg tablet TAKE ONE TABLET BY MOUTH DAILY 90 Tablet 1    terbinafine HCL (LAMISIL) 1 % topical cream Apply  to affected area two (2) times daily as needed. 30 g 0    fluticasone propionate (FLONASE) 50 mcg/actuation nasal spray SPRAY TWO SPRAYS IN EACH NOSTRIL ONCE DAILY AS NEEDED FOR ALLERGIES 1 Bottle 5    diclofenac (Voltaren) 1 % gel Apply topically to affected area daily as needed      loratadine (CLARITIN) 10 mg tablet Take 10 mg by mouth daily.  ALPRAZolam (XANAX) 0.5 mg tablet TAKE ONE TABLET BY MOUTH AT NIGHTLY AS NEEDED 90 Tab 0    melatonin 3 mg tablet Take 3 mg by mouth nightly as needed.  acetaminophen (TYLENOL ARTHRITIS PAIN) 650 mg CR tablet Take 1,300 mg by mouth every six (6) hours as needed for Pain.  CALCIUM CARB/VIT D3/MINERALS (CALTRATE PLUS PO) Take 1 Tab by mouth daily.  ondansetron (ZOFRAN ODT) 4 mg disintegrating tablet Take 1 Tablet by mouth every eight (8) hours as needed for Nausea or Vomiting.  (Patient not taking: Reported on 7/19/2022) 20 Tablet 0    triamcinolone acetonide (KENALOG) 0.1 % topical cream APPLY TO AFFECTED AREA(S) TWO TIMES A DAY AS NEEDED FOR SKIN IRRITATION, USE A THIN LAYER TO RASH ON HER NECK (Patient not taking: Reported on 7/19/2022) 15 g 0       Allergies   Allergen Reactions    Ciprofloxacin Other (comments)     Insomnia and hot flashes    Demerol [Meperidine] Other (comments)     hallucinations    Doxycycline Unknown (comments)       \"Feeling flushed\"    Floxin [Ofloxacin] Other (comments)     insomnia    Lisinopril Cough    Phenergan-Codeine [Promethazine-Codeine] Itching     Patient stated she started itching when she was tapering off of medication.     Prednisone Other (comments)     Has feelings of depression, \"weird thoughts\"       Past Medical History:   Diagnosis Date    C section 3/12/2010    Depression 3/12/2010    Diabetes ([address]     Environmental allergies 3/12/2010    GERD (gastroesophageal reflux disease)     H/O: hysterectomy 3/12/2010    High cholesterol 3/12/2010    HTN (hypertension) 3/12/2010    Pneumonia 2017       Past Surgical History:   Procedure Laterality Date    COLONOSCOPY N/A 7/15/2020    COLONOSCOPY performed by Nirali Arteaga MD at [facility] ENDOSCOPY    ENDOSCOPY, COLON, DIAGNOSTIC      repeat 10 yrs (Dr. Radha Larsen)    HX BREAST BIOPSY  1980    bilateral negative    HX BREAST BIOPSY  1986    left negative    HX  SECTION      x 3    HX CORNEAL TRANSPLANT  2011    left eye-     HX CYSTOSTOMY  3/2015    HX HYSTERECTOMY  1998       Family History   Problem Relation Age of Onset    Hypertension Mother     Other Mother         obesity, chf    Heart Disease Mother         CHF    Kidney Disease Mother     High Cholesterol Mother     Cancer Father         lung    Breast Cancer Sister     Mult Sclerosis Sister     High Cholesterol Sister     Hypertension Sister     Cancer Sister         breast    Hypertension Brother     Liver Disease Brother         Hep C    High Cholesterol Brother     Cancer Brother         colon    Hypertension Sister     Diabetes Sister     High Cholesterol Sister     Hypertension Sister     Other Sister         obese    High Cholesterol Sister     Cancer Niece cervical       Social History     Tobacco Use    Smoking status: Never Smoker    Smokeless tobacco: Never Used   Substance Use Topics    Alcohol use: Yes     Alcohol/week: 0.0 standard drinks     Comment: occasional        ROS    Physical Exam  Vitals and nursing note reviewed. Constitutional:       Appearance: Normal appearance. She is well-developed. Comments: /62 (BP 1 Location: Right upper arm, BP Patient Position: Sitting, BP Cuff Size: Large adult)   Pulse 76   Temp 99.1 °F (37.3 °C) (Oral)   Resp 18   Ht 5' 6\" (1.676 m)   Wt 256 lb 3.2 oz (116.2 kg)   LMP 11/19/1998   SpO2 98%   BMI 41.35 kg/m²    Neck:      Thyroid: No thyromegaly. Cardiovascular:      Rate and Rhythm: Normal rate and regular rhythm. Heart sounds: Normal heart sounds. No gallop. Pulmonary:      Effort: Pulmonary effort is normal.      Breath sounds: Normal breath sounds. Musculoskeletal:      Right lower leg: No edema. Left lower leg: No edema. Skin:     Comments: Candida dermatitis in the fold under breasts and right fold of the axilla. Erythematous papular lesion rash on the right side of the neck. Neurological:      Mental Status: She is alert. ASSESSMENT and PLAN  Diagnoses and all orders for this visit:    1. Candidal dermatitis  -     ketoconazole (NIZORAL) 2 % topical cream; Apply  to affected area two (2) times a day. Apply to rash under breasts until healed. -     nystatin (MYCOSTATIN) powder; Apply  to affected area two (2) times daily as needed (rash under breasts). Discussed hygiene and to keep clean and dry     2. Irritant dermatitis  Continue with HC cream until healed. reviewed medications and side effects in detail    I have discussed diagnosis listed in this note with pt and/or family. I have discussed treatment plans and options and the risk/benefit analysis of those options, including safe use of medications and possible medication side effects.    Through the use of shared decision making we have agreed to the above plan. The patient has received an after-visit summary and questions were answered concerning future plans and follow up. Advise pt of any urgent changes then to proceed to the ER.

## 2022-07-19 NOTE — PATIENT INSTRUCTIONS
Yeast Skin Infection: Care Instructions  Your Care Instructions     Yeast normally lives on your skin. Sometimes too much yeast can overgrow in certain areas of the skin and cause an infection. The infection causes red, scaly, moist patches on your skin that may itch. Common areas for skin yeast infections are skin folds under the breasts or belly area. The warm and moist areas in the skin folds can make it easier for yeast to overgrow. Yeast infections also can be found on other parts of the body such as the groin or armpits. You will probably get a cream or ointment that contains an antifungal medicine. Examples of these are miconazole and clotrimazole. You put it on your skin to treat the infection. Your doctor may give you a prescription for the cream or ointment. Or you may be able to buy it without a prescription at most drugstores. If the infection is severe, the doctor will prescribe antifungal pills. A yeast infection usually goes away after about a week of treatment. But it's important to use the medicine for as long as your doctor tells you to. Follow-up care is a key part of your treatment and safety. Be sure to make and go to all appointments, and call your doctor if you are having problems. It's also a good idea to know your test results and keep a list of the medicines you take. How can you care for yourself at home? · Be safe with medicines. Take your medicines exactly as prescribed. Call your doctor if you think you are having a problem with your medicine. · Keep your skin clean and dry. Your doctor may suggest using powder that contains an antifungal medicine in the skin folds. · Wear loose clothing. When should you call for help? Call your doctor now or seek immediate medical care if:    · You have symptoms of infection, such as:  ? Increased pain, swelling, warmth, or redness. ? Red streaks leading from the area. ? Pus draining from the area. ? A fever.    Watch closely for changes in your health, and be sure to contact your doctor if:    · You do not get better as expected. Where can you learn more? Go to http://www.gray.com/  Enter A142 in the search box to learn more about \"Yeast Skin Infection: Care Instructions. \"  Current as of: November 15, 2021               Content Version: 13.2  © 7284-2083 [employer]. Care instructions adapted under license by [employer] (which disclaims liability or warranty for this information). If you have questions about a medical condition or this instruction, always ask your healthcare professional. [address] any warranty or liability for your use of this information.

## 2022-08-22 DIAGNOSIS — Z91.09 ENVIRONMENTAL ALLERGIES: ICD-10-CM

## 2022-08-22 RX ORDER — FLUTICASONE PROPIONATE 50 MCG
SPRAY, SUSPENSION (ML) NASAL
Qty: 1 EACH | Refills: 5 | Status: SHIPPED | OUTPATIENT
Start: 2022-08-22

## 2022-08-22 NOTE — TELEPHONE ENCOUNTER
Last visit:7/19/22  Next visit:9/02/22  Previous refill 6/15/21(1 bottle+5R)    Requested Prescriptions     Pending Prescriptions Disp Refills    fluticasone propionate (FLONASE) 50 mcg/actuation nasal spray 1 Each 5     Sig: SPRAY TWO SPRAYS IN EACH NOSTRIL ONCE DAILY AS NEEDED FOR ALLERGIES     For Pharmacy Admin Tracking Only    CPA in place:   Recommendation Provided To:    Intervention Detail: New Rx: 1, reason: Patient Preference  Gap Closed?:   Intervention Accepted By:   Time Spent (min): 5

## 2022-09-02 ENCOUNTER — OFFICE VISIT (OUTPATIENT)
Dept: FAMILY MEDICINE CLINIC | Age: 67
End: 2022-09-02
Payer: MEDICARE

## 2022-09-02 VITALS
OXYGEN SATURATION: 98 % | SYSTOLIC BLOOD PRESSURE: 133 MMHG | DIASTOLIC BLOOD PRESSURE: 62 MMHG | BODY MASS INDEX: 41.21 KG/M2 | RESPIRATION RATE: 18 BRPM | TEMPERATURE: 98.3 F | WEIGHT: 256.4 LBS | HEIGHT: 66 IN | HEART RATE: 83 BPM

## 2022-09-02 DIAGNOSIS — Z91.09 ENVIRONMENTAL ALLERGIES: ICD-10-CM

## 2022-09-02 DIAGNOSIS — F32.A MILD DEPRESSION: ICD-10-CM

## 2022-09-02 DIAGNOSIS — E66.01 MORBID OBESITY (HCC): ICD-10-CM

## 2022-09-02 DIAGNOSIS — E11.9 TYPE 2 DIABETES MELLITUS WITHOUT COMPLICATION, WITHOUT LONG-TERM CURRENT USE OF INSULIN (HCC): ICD-10-CM

## 2022-09-02 DIAGNOSIS — Z00.00 MEDICARE ANNUAL WELLNESS VISIT, SUBSEQUENT: Primary | ICD-10-CM

## 2022-09-02 DIAGNOSIS — E78.2 MIXED HYPERLIPIDEMIA: ICD-10-CM

## 2022-09-02 DIAGNOSIS — Z51.81 ENCOUNTER FOR MEDICATION MONITORING: ICD-10-CM

## 2022-09-02 DIAGNOSIS — I10 ESSENTIAL HYPERTENSION: ICD-10-CM

## 2022-09-02 DIAGNOSIS — L23.9 ALLERGIC DERMATITIS: ICD-10-CM

## 2022-09-02 LAB
GLUCOSE POC: 117 MG/DL
HBA1C MFR BLD HPLC: 5.5 %

## 2022-09-02 PROCEDURE — 2022F DILAT RTA XM EVC RTNOPTHY: CPT | Performed by: FAMILY MEDICINE

## 2022-09-02 PROCEDURE — 3044F HG A1C LEVEL LT 7.0%: CPT | Performed by: FAMILY MEDICINE

## 2022-09-02 PROCEDURE — G9717 DOC PT DX DEP/BP F/U NT REQ: HCPCS | Performed by: FAMILY MEDICINE

## 2022-09-02 PROCEDURE — G0439 PPPS, SUBSEQ VISIT: HCPCS | Performed by: FAMILY MEDICINE

## 2022-09-02 PROCEDURE — G8536 NO DOC ELDER MAL SCRN: HCPCS | Performed by: FAMILY MEDICINE

## 2022-09-02 PROCEDURE — 83036 HEMOGLOBIN GLYCOSYLATED A1C: CPT | Performed by: FAMILY MEDICINE

## 2022-09-02 PROCEDURE — 99214 OFFICE O/P EST MOD 30 MIN: CPT | Performed by: FAMILY MEDICINE

## 2022-09-02 PROCEDURE — 1123F ACP DISCUSS/DSCN MKR DOCD: CPT | Performed by: FAMILY MEDICINE

## 2022-09-02 PROCEDURE — G8417 CALC BMI ABV UP PARAM F/U: HCPCS | Performed by: FAMILY MEDICINE

## 2022-09-02 PROCEDURE — G8427 DOCREV CUR MEDS BY ELIG CLIN: HCPCS | Performed by: FAMILY MEDICINE

## 2022-09-02 PROCEDURE — G0463 HOSPITAL OUTPT CLINIC VISIT: HCPCS | Performed by: FAMILY MEDICINE

## 2022-09-02 PROCEDURE — 1101F PT FALLS ASSESS-DOCD LE1/YR: CPT | Performed by: FAMILY MEDICINE

## 2022-09-02 PROCEDURE — G8754 DIAS BP LESS 90: HCPCS | Performed by: FAMILY MEDICINE

## 2022-09-02 PROCEDURE — 82947 ASSAY GLUCOSE BLOOD QUANT: CPT | Performed by: FAMILY MEDICINE

## 2022-09-02 PROCEDURE — G8399 PT W/DXA RESULTS DOCUMENT: HCPCS | Performed by: FAMILY MEDICINE

## 2022-09-02 PROCEDURE — 1090F PRES/ABSN URINE INCON ASSESS: CPT | Performed by: FAMILY MEDICINE

## 2022-09-02 PROCEDURE — 3017F COLORECTAL CA SCREEN DOC REV: CPT | Performed by: FAMILY MEDICINE

## 2022-09-02 PROCEDURE — G9899 SCRN MAM PERF RSLTS DOC: HCPCS | Performed by: FAMILY MEDICINE

## 2022-09-02 PROCEDURE — G8752 SYS BP LESS 140: HCPCS | Performed by: FAMILY MEDICINE

## 2022-09-02 RX ORDER — MINERAL OIL
180 ENEMA (ML) RECTAL
COMMUNITY
Start: 2022-09-02

## 2022-09-02 RX ORDER — TRIAMCINOLONE ACETONIDE 1 MG/G
CREAM TOPICAL
Qty: 15 G | Refills: 0 | COMMUNITY
Start: 2022-09-02

## 2022-09-02 NOTE — PROGRESS NOTES
Chief Complaint   Patient presents with    Annual Wellness Visit     Having ear issues and have a bite, she would like to discuss        1. \"Have you been to the ER, urgent care clinic since your last visit? Hospitalized since your last visit? \" No    2. \"Have you seen or consulted any other health care providers outside of the [address] [place] [address] since your last visit? \" No     3. For patients aged 39-70: Has the patient had a colonoscopy / FIT/ Cologuard? Yes - no Care Gap present  2020    If the patient is female:    4. For patients aged 41-77: Has the patient had a mammogram within the past 2 years? Yes - no Care Gap present  2021    5. For patients aged 21-65: Has the patient had a pap smear?  NA - based on age or sex    Health Maintenance Due   Topic Date Due    COVID-19 Vaccine (4 - Booster for Moderna series) 06/04/2022    Medicare Yearly Exam  07/14/2022    Flu Vaccine (1) 09/01/2022

## 2022-09-02 NOTE — PROGRESS NOTES
HISTORY OF PRESENT ILLNESS  Kiley Comer is a 75 y.o. female. HPI   Follow up on chronic medical problems. Overall has been feeling well. Cardiovascular Review:  The patient has hypertension and hyperlipidemia. Diet and Lifestyle: generally follows a low fat low cholesterol diet, generally follows a low sodium diet, follows a diabetic diet regularly, exercises sporadically  Home BP Monitoring: is not measured at home. Pertinent ROS: taking medications as instructed, no medication side effects noted, no TIA's, no chest pain on exertion, no dyspnea on exertion, slight swelling of ankles. Follow up on diabetes and starting on Ozempic. Harjinder Pond DM type II follow up:  She met with omega and has started on the Ozempic. Compliant w/ diabetic diet, and exercise. Has not been checking BS recently. Denies any tingling sensation, polyuria and polydipsia. No blurred vision. No significant weight changes. Feeling well since last OV. Depression Review:  Patient is seen for followup of depression. Treatment includes Celexa. Ongoing symptoms include fatigue and intermittent anxiety. She denies depressed mood, insomnia and hopelessness. She experiences the following side effects from the treatment: none. Patient Active Problem List   Diagnosis Code    Environmental allergies Z91.09    Depression F32. 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 Apply to rash under breasts until healed. 30 g 0    nystatin (MYCOSTATIN) powder Apply  to affected area two (2) times daily as needed (rash under breasts). 60 g 1    semaglutide (Ozempic) 0.25 mg or 0.5 mg/dose (2 mg/1.5 ml) subq pen 0.5 mg by SubCUTAneous route every seven (7) days. 1 Box 1    omeprazole (PRILOSEC) 40 mg capsule Take 1 Capsule by mouth daily. 90 Capsule 3    Psyllium Husk-Aspartame (Metamucil Sugar-Free, aspart,) 3.4 gram/5.8 gram powd Take daily. 283 g 0    pravastatin (PRAVACHOL) 20 mg tablet TAKE TWO TABLETS BY MOUTH ONCE NIGHTLY 180 Tablet 3    losartan-hydroCHLOROthiazide (HYZAAR) 50-12.5 mg per tablet TAKE ONE TABLET BY MOUTH DAILY 90 Tablet 3    cyanocobalamin (Vitamin B-12) 1,000 mcg tablet Take 1,000 mcg by mouth daily.  ondansetron (ZOFRAN ODT) 4 mg disintegrating tablet Take 1 Tablet by mouth every eight (8) hours as needed for Nausea or Vomiting. (Patient not taking: Reported on 7/19/2022) 20 Tablet 0    lancets misc Monitor BG daily  Dx: E11.9. Provide with lancets insurance covers 100 Each 3    Blood-Glucose Meter monitoring kit Monitor BG daily  Dx: E11.9. Provide with glucometer insurance covers 1 Kit 0    glucose blood VI test strips (blood glucose test) strip Monitor BG daily  Dx: E11.9  Provide with test strips insurance covers 100 Strip 3    OTHER Take 1 Tablet by mouth daily. Elderberry/VitaminC/Zinc/Vitamin D3 combination product      levocetirizine (XYZAL) 5 mg tablet Take 1 Tablet by mouth daily. 90 Tablet 3    albuterol (ProAir HFA) 90 mcg/actuation inhaler Take 2 Puffs by inhalation every four (4) hours as needed for Wheezing or Shortness of Breath. Please instruct Pt on how to use an inhaler. 1 Each 3    citalopram (CELEXA) 20 mg tablet Take 1 Tablet by mouth daily.  90 Tablet 3    furosemide (LASIX) 20 mg tablet TAKE ONE TABLET BY MOUTH DAILY 90 Tablet 1    triamcinolone acetonide (KENALOG) 0.1 % topical cream APPLY TO AFFECTED AREA(S) TWO TIMES A DAY AS NEEDED FOR SKIN IRRITATION, USE A THIN LAYER TO RASH ON HER NECK (Patient not taking: Reported on 7/19/2022) 15 g 0    terbinafine HCL (LAMISIL) 1 % topical cream Apply  to affected area two (2) times daily as needed. 30 g 0    diclofenac (Voltaren) 1 % gel Apply topically to affected area daily as needed      loratadine (CLARITIN) 10 mg tablet Take 10 mg by mouth daily.  ALPRAZolam (XANAX) 0.5 mg tablet TAKE ONE TABLET BY MOUTH AT NIGHTLY AS NEEDED 90 Tab 0    melatonin 3 mg tablet Take 3 mg by mouth nightly as needed.  acetaminophen (TYLENOL ARTHRITIS PAIN) 650 mg CR tablet Take 1,300 mg by mouth every six (6) hours as needed for Pain.  CALCIUM CARB/VIT D3/MINERALS (CALTRATE PLUS PO) Take 1 Tab by mouth daily. Allergies   Allergen Reactions    Ciprofloxacin Other (comments)     Insomnia and hot flashes    Demerol [Meperidine] Other (comments)     hallucinations    Doxycycline Unknown (comments)       \"Feeling flushed\"    Floxin [Ofloxacin] Other (comments)     insomnia    Lisinopril Cough    Phenergan-Codeine [Promethazine-Codeine] Itching     Patient stated she started itching when she was tapering off of medication.     Prednisone Other (comments)     Has feelings of depression, \"weird thoughts\"         Past Medical History:   Diagnosis Date    C section 3/12/2010    Depression 3/12/2010    Diabetes ([facility] [address]     Environmental allergies 3/12/2010    GERD (gastroesophageal reflux disease)     H/O: hysterectomy 3/12/2010    High cholesterol 3/12/2010    HTN (hypertension) 3/12/2010    Pneumonia 04/2017         Past Surgical History:   Procedure Laterality Date    COLONOSCOPY N/A 7/15/2020    COLONOSCOPY performed by Awilda [facility]arro [facility] at [facility] ENDOSCOPY    ENDOSCOPY, COLON, DIAGNOSTIC  2006    repeat 10 yrs (Dr. Jairo Garrett)    HX BREAST BIOPSY  01/01/1980    bilateral negative    HX BREAST BIOPSY  1986    left negative    HX  SECTION      x 3    HX CORNEAL TRANSPLANT  2011    left eye-     HX CYSTOSTOMY  3/2015    HX HYSTERECTOMY  1998         Family History   Problem Relation Age of Onset    Hypertension Mother     Other Mother         obesity, chf    Heart Disease Mother         CHF    Kidney Disease Mother     High Cholesterol Mother     Cancer Father         lung    Breast Cancer Sister     Mult Sclerosis Sister     High Cholesterol Sister     Hypertension Sister     Cancer Sister         breast    Hypertension Brother     Liver Disease Brother         Hep C    High Cholesterol Brother     Cancer Brother         colon    Hypertension Sister     Diabetes Sister     High Cholesterol Sister     Hypertension Sister     Other Sister         obese    High Cholesterol Sister     Cancer Niece         cervical       Social History     Tobacco Use    Smoking status: Never    Smokeless tobacco: Never   Substance Use Topics    Alcohol use:  Yes     Alcohol/week: 0.0 standard drinks     Comment: occasional        Lab Results   Component Value Date/Time    WBC 14.9 (H) 2022 01:28 PM    HGB 14.2 2022 01:28 PM    Hemoglobin (POC) 15.0 2016 05:26 PM    HCT 44.8 2022 01:28 PM    Hematocrit (POC) 44 2016 05:26 PM    PLATELET 075  01:28 PM    MCV 92.9 2022 01:28 PM     Lab Results   Component Value Date/Time    Cholesterol, total 181 02/15/2022 09:23 AM    HDL Cholesterol 47 02/15/2022 09:23 AM    LDL, calculated 107 (H) 02/15/2022 09:23 AM    Triglyceride 135 02/15/2022 09:23 AM    CHOL/HDL Ratio 3.9 02/15/2022 09:23 AM     Lab Results   Component Value Date/Time    TSH 2.450 2019 01:29 PM      Lab Results   Component Value Date/Time    Sodium 136 2022 12:36 PM    Potassium 4.5 2022 12:36 PM    Chloride 102 2022 12:36 PM    CO2 31 2022 12:36 PM    Anion gap 3 (L) 2022 12:36 PM Glucose 88 04/05/2022 12:36 PM    BUN 14 04/05/2022 12:36 PM    Creatinine 0.85 04/05/2022 12:36 PM    BUN/Creatinine ratio 16 04/05/2022 12:36 PM    GFR est AA >60 04/05/2022 12:36 PM    GFR est non-AA >60 04/05/2022 12:36 PM    Calcium 9.9 04/05/2022 12:36 PM    Bilirubin, total 0.3 01/29/2022 01:00 AM    ALT (SGPT) 20 01/29/2022 01:00 AM    Alk. phosphatase 77 01/29/2022 01:00 AM    Protein, total 7.2 01/29/2022 01:00 AM    Albumin 3.9 01/29/2022 01:00 AM    Globulin 3.3 01/29/2022 01:00 AM    A-G Ratio 1.2 01/29/2022 01:00 AM      Lab Results   Component Value Date/Time    Hemoglobin A1c 5.9 (H) 01/09/2021 08:58 AM    Hemoglobin A1c (POC) 6.3 02/15/2022 09:13 AM         Review of Systems   Constitutional:  Negative for malaise/fatigue. HENT:  Negative for congestion. Eyes:  Negative for blurred vision. Respiratory:  Negative for cough and shortness of breath. Cardiovascular:  Negative for chest pain, palpitations and leg swelling. Gastrointestinal:  Negative for abdominal pain, constipation and heartburn. Genitourinary:  Negative for dysuria, frequency and urgency. Musculoskeletal:  Negative for back pain and joint pain. Skin:         Bug bite on the right upper arm. Advised to use the kenalog cream prn. Neurological:  Negative for dizziness, tingling and headaches. Endo/Heme/Allergies:  Positive for environmental allergies (has been using allegra). Psychiatric/Behavioral:  Negative for depression. The patient does not have insomnia. Physical Exam  Vitals and nursing note reviewed. Constitutional:       Appearance: Normal appearance. She is well-developed.       Comments: /62 (BP 1 Location: Left upper arm, BP Patient Position: Sitting, BP Cuff Size: Adult)   Pulse 83   Temp 98.3 °F (36.8 °C) (Oral)   Resp 18   Ht 5' 6\" (1.676 m)   Wt 256 lb 6.4 oz (116.3 kg)   LMP 11/19/1998   SpO2 98%   BMI 41.38 kg/m²    HENT:      Right Ear: Tympanic membrane and ear canal normal.      Left Ear: Tympanic membrane and ear canal normal.   Neck:      Thyroid: No thyromegaly. Vascular: No carotid bruit. Cardiovascular:      Rate and Rhythm: Normal rate and regular rhythm. Pulses: Normal pulses. Heart sounds: Normal heart sounds. Pulmonary:      Effort: Pulmonary effort is normal.      Breath sounds: Normal breath sounds. Chest:   Breasts:     Right: Normal. No axillary adenopathy or supraclavicular adenopathy. Left: Normal. No axillary adenopathy or supraclavicular adenopathy. Abdominal:      General: Bowel sounds are normal.      Palpations: Abdomen is soft. There is no mass. Tenderness: There is no abdominal tenderness. Genitourinary:     Rectum: Guaiac result negative. Musculoskeletal:         General: No swelling. Normal range of motion. Cervical back: Normal range of motion and neck supple. Right lower leg: No edema. Left lower leg: No edema. Lymphadenopathy:      Cervical: No cervical adenopathy. Upper Body:      Right upper body: No supraclavicular, axillary or pectoral adenopathy. Left upper body: No supraclavicular, axillary or pectoral adenopathy. Skin:     General: Skin is warm and dry. Comments: Mildly erythematous bug bite on the right upper arm   Neurological:      General: No focal deficit present. Mental Status: She is alert and oriented to person, place, and time. Psychiatric:         Mood and Affect: Mood normal.     ASSESSMENT and PLAN  Diagnoses and all orders for this visit:    1. Medicare annual wellness visit, subsequent    2. Essential hypertension  Discussed sodium restriction, high k rich diet, maintaining ideal body weight and regular exercise program such as daily walking 30 min perday 4-5 times per week, as physiologic means to achieve blood pressure control. Medication compliance advised.      3. Type 2 diabetes mellitus without complication, without long-term current use of insulin ([facility] [address]  A1c at 5.5%. Continue to monitor. Work on diet and exercise. -     AMB POC HEMOGLOBIN A1C  -     AMB POC GLUCOSE, QUANTITATIVE, BLOOD  -     MICROALBUMIN, UR, RAND W/ MICROALB/CREAT RATIO; Future    4. Mixed hyperlipidemia  Continue to monitor. Work on diet and exercise.  -     LIPID PANEL; Future    5. Mild depression  Stable on celexa    6. Allergic dermatitis  Use kenalog cream as needed     7. Environmental allergies  Stable with allegra and flonase    8. Encounter for medication monitoring  -     METABOLIC PANEL, COMPREHENSIVE; Future  -     URINALYSIS W/MICROSCOPIC; Future    9. Morbid Obesity  I have reviewed/discussed the above normal BMI with the patient. I have recommended the following interventions: dietary management education, guidance, and counseling . Follow-up and Dispositions    Return in about 6 months (around 3/2/2023). current treatment plan is effective, no change in therapy  reviewed diet, exercise and weight control  cardiovascular risk and specific lipid/LDL goals reviewed  reviewed medications and side effects in detail  specific diabetic recommendations: low cholesterol diet, weight control and daily exercise discussed, all medications, side effects and compliance discussed carefully, foot care discussed and Podiatry visits discussed, annual eye examinations at Ophthalmology discussed, and glycohemoglobin and other lab monitoring discussed    I have discussed diagnosis listed in this note with pt and/or family. I have discussed treatment plans and options and the risk/benefit analysis of those options, including safe use of medications and possible medication side effects. Through the use of shared decision making we have agreed to the above plan. The patient has received an after-visit summary and questions were answered concerning future plans and follow up. Advise pt of any urgent changes then to proceed to the ER.

## 2022-09-02 NOTE — PROGRESS NOTES
This is a Subsequent Medicare Annual Wellness Exam (AWV) (Performed 12 months after IPPE or effective date of Medicare Part B enrollment)    I have reviewed the patient's medical history in detail and updated the computerized patient record. History     Patient Active Problem List   Diagnosis Code    Environmental allergies Z91.09    Depression F32. 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 Apply to rash under breasts until healed. 30 g 0    nystatin (MYCOSTATIN) powder Apply  to affected area two (2) times daily as needed (rash under breasts). 60 g 1    semaglutide (Ozempic) 0.25 mg or 0.5 mg/dose (2 mg/1.5 ml) subq pen 0.5 mg by SubCUTAneous route every seven (7) days. 1 Box 1    omeprazole (PRILOSEC) 40 mg capsule Take 1 Capsule by mouth daily. 90 Capsule 3    Psyllium Husk-Aspartame (Metamucil Sugar-Free, aspart,) 3.4 gram/5.8 gram powd Take daily. 283 g 0    pravastatin (PRAVACHOL) 20 mg tablet TAKE TWO TABLETS BY MOUTH ONCE NIGHTLY 180 Tablet 3    losartan-hydroCHLOROthiazide (HYZAAR) 50-12.5 mg per tablet TAKE ONE TABLET BY MOUTH DAILY 90 Tablet 3    cyanocobalamin (Vitamin B-12) 1,000 mcg tablet Take 1,000 mcg by mouth daily. ondansetron (ZOFRAN ODT) 4 mg disintegrating tablet Take 1 Tablet by mouth every eight (8) hours as needed for Nausea or Vomiting.  (Patient not taking: Reported on 7/19/2022) 20 Tablet 0    lancets misc Monitor BG daily  Dx: E11.9. Provide with lancets insurance covers 100 Each 3    Blood-Glucose Meter monitoring kit Monitor BG daily  Dx: E11.9. Provide with glucometer insurance covers 1 Kit 0    glucose blood VI test strips (blood glucose test) strip Monitor BG daily  Dx: E11.9  Provide with test strips insurance covers 100 Strip 3    OTHER Take 1 Tablet by mouth daily. Elderberry/VitaminC/Zinc/Vitamin D3 combination product      levocetirizine (XYZAL) 5 mg tablet Take 1 Tablet by mouth daily. 90 Tablet 3    albuterol (ProAir HFA) 90 mcg/actuation inhaler Take 2 Puffs by inhalation every four (4) hours as needed for Wheezing or Shortness of Breath. Please instruct Pt on how to use an inhaler. 1 Each 3    citalopram (CELEXA) 20 mg tablet Take 1 Tablet by mouth daily. 90 Tablet 3    furosemide (LASIX) 20 mg tablet TAKE ONE TABLET BY MOUTH DAILY 90 Tablet 1    triamcinolone acetonide (KENALOG) 0.1 % topical cream APPLY TO AFFECTED AREA(S) TWO TIMES A DAY AS NEEDED FOR SKIN IRRITATION, USE A THIN LAYER TO RASH ON HER NECK (Patient not taking: Reported on 7/19/2022) 15 g 0    terbinafine HCL (LAMISIL) 1 % topical cream Apply  to affected area two (2) times daily as needed. 30 g 0    diclofenac (Voltaren) 1 % gel Apply topically to affected area daily as needed      loratadine (CLARITIN) 10 mg tablet Take 10 mg by mouth daily. ALPRAZolam (XANAX) 0.5 mg tablet TAKE ONE TABLET BY MOUTH AT NIGHTLY AS NEEDED 90 Tab 0    melatonin 3 mg tablet Take 3 mg by mouth nightly as needed. acetaminophen (TYLENOL ARTHRITIS PAIN) 650 mg CR tablet Take 1,300 mg by mouth every six (6) hours as needed for Pain. CALCIUM CARB/VIT D3/MINERALS (CALTRATE PLUS PO) Take 1 Tab by mouth daily.          Allergies   Allergen Reactions    Ciprofloxacin Other (comments)     Insomnia and hot flashes    Demerol [Meperidine] Other (comments)     hallucinations    Doxycycline Unknown (comments)       \"Feeling flushed\"    Floxin [Ofloxacin] Other (comments)     insomnia    Lisinopril Cough    Phenergan-Codeine [Promethazine-Codeine] Itching     Patient stated she started itching when she was tapering off of medication. Prednisone Other (comments)     Has feelings of depression, \"weird thoughts\"         Past Medical History:   Diagnosis Date    C section 3/12/2010    Depression 3/12/2010    Diabetes ([address]     Environmental allergies 3/12/2010    GERD (gastroesophageal reflux disease)     H/O: hysterectomy 3/12/2010    High cholesterol 3/12/2010    HTN (hypertension) 3/12/2010    Pneumonia 2017         Past Surgical History:   Procedure Laterality Date    COLONOSCOPY N/A 7/15/2020    COLONOSCOPY performed by Perry Becerra MD at [facility] ENDOSCOPY    ENDOSCOPY, COLON, DIAGNOSTIC      repeat 10 yrs (Dr. Marce Wise)    HX BREAST BIOPSY  1980    bilateral negative    HX BREAST BIOPSY  1986    left negative    HX  SECTION      x 3    HX CORNEAL TRANSPLANT  2011    left eye-     HX CYSTOSTOMY  3/2015    HX HYSTERECTOMY  1998         Family History   Problem Relation Age of Onset    Hypertension Mother     Other Mother         obesity, chf    Heart Disease Mother         CHF    Kidney Disease Mother     High Cholesterol Mother     Cancer Father         lung    Breast Cancer Sister     Mult Sclerosis Sister     High Cholesterol Sister     Hypertension Sister     Cancer Sister         breast    Hypertension Brother     Liver Disease Brother         Hep C    High Cholesterol Brother     Cancer Brother         colon    Hypertension Sister     Diabetes Sister     High Cholesterol Sister     Hypertension Sister     Other Sister         obese    High Cholesterol Sister     Cancer Niece         cervical       Social History     Tobacco Use    Smoking status: Never    Smokeless tobacco: Never   Substance Use Topics    Alcohol use:  Yes     Alcohol/week: 0.0 standard drinks Comment: occasional         Depression Risk Factor Screening:     PHQ over the last two weeks    Little interest or pleasure in doing things Not at all   Feeling down, depressed or hopeless Not at all   Total Score PHQ 2 0     Alcohol Risk Factor Screening: You do not drink alcohol or very rarely. Functional Ability and Level of Safety:   Hearing Loss  Hearing is good. Activities of Daily Living  The home contains: discussed safety equipment. Patient does total self care    Fall RiskFall Risk Assessment, last 12 mths    Able to walk? Yes   Fall in past 12 months? No     Functional Ability:   Does the patient exhibit a steady gait? yes    How long did it take the patient to get up and walk from a sitting position? seconds    Is the patient self reliant? (ie can do own laundry, meals, household chores)  yes   Does the patient handle his/her own medications? yes   Does the patient handle his/her own money? yes   Is the patients home safe (ie good lighting, handrails on stairs and bath, etc.)? yes   Did you notice or did patient express any hearing difficulties? no   Did you notice or did patient express any vision difficulties? no        Advance Care Planning:   Patient was offered the opportunity to discuss advance care planning:  yes    Does patient have an Advance Directive:  no   If no, did you provide information on [facility]ctions? yes      Abuse Screen  Patient is not abused    Cognitive Screening   Evaluation of Cognitive Function:  Has your family/caregiver stated any concerns about your memory: no      Patient Care Team   Patient Care Team:  Al Osullivan MD as PCP - General    Assessment/Plan   Education and counseling provided:  Are appropriate based on today's review and evaluation  End-of-Life planning (with patient's consent)    ASSESSMENT and PLAN    Medicare Annual Wellness  Continue current treatment plan. Continue annual follow up.       I have discussed diagnosis listed in this note with pt and/or family. I have discussed treatment plans and options and the risk/benefit analysis of those options, including safe use of medications and possible medication side effects. Through the use of shared decision making we have agreed to the above plan. The patient has received an after-visit summary and questions were answered concerning future plans and follow up. Advise pt of any urgent changes then to proceed to the ER.

## 2022-09-03 LAB
ALBUMIN SERPL-MCNC: 3.9 G/DL (ref 3.5–5)
ALBUMIN/GLOB SERPL: 1.4 {RATIO} (ref 1.1–2.2)
ALP SERPL-CCNC: 75 U/L (ref 45–117)
ALT SERPL-CCNC: 20 U/L (ref 12–78)
ANION GAP SERPL CALC-SCNC: 2 MMOL/L (ref 5–15)
APPEARANCE UR: ABNORMAL
AST SERPL-CCNC: 12 U/L (ref 15–37)
BACTERIA URNS QL MICRO: ABNORMAL /HPF
BILIRUB SERPL-MCNC: 0.2 MG/DL (ref 0.2–1)
BILIRUB UR QL: NEGATIVE
BUN SERPL-MCNC: 17 MG/DL (ref 6–20)
BUN/CREAT SERPL: 19 (ref 12–20)
CALCIUM SERPL-MCNC: 9.6 MG/DL (ref 8.5–10.1)
CAOX CRY URNS QL MICRO: ABNORMAL
CHLORIDE SERPL-SCNC: 104 MMOL/L (ref 97–108)
CHOLEST SERPL-MCNC: 156 MG/DL
CO2 SERPL-SCNC: 33 MMOL/L (ref 21–32)
COLOR UR: ABNORMAL
CREAT SERPL-MCNC: 0.89 MG/DL (ref 0.55–1.02)
CREAT UR-MCNC: 199 MG/DL
EPITH CASTS URNS QL MICRO: ABNORMAL /LPF
GLOBULIN SER CALC-MCNC: 2.7 G/DL (ref 2–4)
GLUCOSE SERPL-MCNC: 100 MG/DL (ref 65–100)
GLUCOSE UR STRIP.AUTO-MCNC: NEGATIVE MG/DL
HDLC SERPL-MCNC: 43 MG/DL
HDLC SERPL: 3.6 {RATIO} (ref 0–5)
HGB UR QL STRIP: ABNORMAL
KETONES UR QL STRIP.AUTO: NEGATIVE MG/DL
LDLC SERPL CALC-MCNC: 75.8 MG/DL (ref 0–100)
LEUKOCYTE ESTERASE UR QL STRIP.AUTO: ABNORMAL
MICROALBUMIN UR-MCNC: 0.85 MG/DL
MICROALBUMIN/CREAT UR-RTO: 4 MG/G (ref 0–30)
NITRITE UR QL STRIP.AUTO: NEGATIVE
PH UR STRIP: 6.5 [PH] (ref 5–8)
POTASSIUM SERPL-SCNC: 4.4 MMOL/L (ref 3.5–5.1)
PROT SERPL-MCNC: 6.6 G/DL (ref 6.4–8.2)
PROT UR STRIP-MCNC: NEGATIVE MG/DL
RBC #/AREA URNS HPF: ABNORMAL /HPF (ref 0–5)
SODIUM SERPL-SCNC: 139 MMOL/L (ref 136–145)
SP GR UR REFRACTOMETRY: 1.02 (ref 1–1.03)
TRIGL SERPL-MCNC: 186 MG/DL (ref ?–150)
UROBILINOGEN UR QL STRIP.AUTO: 0.2 EU/DL (ref 0.2–1)
VLDLC SERPL CALC-MCNC: 37.2 MG/DL
WBC URNS QL MICRO: ABNORMAL /HPF (ref 0–4)
YEAST URNS QL MICRO: PRESENT

## 2022-11-14 DIAGNOSIS — F32.A DEPRESSION, UNSPECIFIED DEPRESSION TYPE: ICD-10-CM

## 2022-11-14 DIAGNOSIS — Z91.09 ENVIRONMENTAL ALLERGIES: ICD-10-CM

## 2022-11-14 RX ORDER — ALBUTEROL SULFATE 90 UG/1
2 AEROSOL, METERED RESPIRATORY (INHALATION)
Qty: 1 EACH | Refills: 3 | Status: SHIPPED | OUTPATIENT
Start: 2022-11-14

## 2022-11-14 RX ORDER — ALPRAZOLAM 0.5 MG/1
TABLET ORAL
Qty: 90 TABLET | Refills: 0 | Status: SHIPPED | OUTPATIENT
Start: 2022-11-14

## 2022-11-14 RX ORDER — CITALOPRAM 20 MG/1
20 TABLET, FILM COATED ORAL DAILY
Qty: 90 TABLET | Refills: 3 | Status: SHIPPED | OUTPATIENT
Start: 2022-11-14

## 2022-11-14 NOTE — TELEPHONE ENCOUNTER
Last Visit: 9/2/22 with MD Roman Mcguire  Next Appointment: 3/3/23 with MD Roman Mcguire  Previous Refill Encounter(s): 1/19/22 #1 with 3 refills    Requested Prescriptions     Pending Prescriptions Disp Refills    albuterol (ProAir HFA) 90 mcg/actuation inhaler 1 Each 3     Sig: Take 2 Puffs by inhalation every four (4) hours as needed for Wheezing or Shortness of Breath. Please instruct Pt on how to use an inhaler. For [address] [facility] in place:   Recommendation Provided To:    Intervention Detail: New Rx: 1, reason: Patient Preference  Gap Closed?:   Intervention Accepted By:   Time Spent (min): 5

## 2022-12-06 ENCOUNTER — TELEPHONE (OUTPATIENT)
Dept: FAMILY MEDICINE CLINIC | Age: 67
End: 2022-12-06

## 2022-12-06 RX ORDER — AMOXICILLIN 500 MG/1
500 CAPSULE ORAL 3 TIMES DAILY
Qty: 30 CAPSULE | Refills: 0 | Status: SHIPPED | OUTPATIENT
Start: 2022-12-06 | End: 2022-12-16

## 2022-12-06 RX ORDER — AZITHROMYCIN 250 MG/1
TABLET, FILM COATED ORAL
Qty: 6 TABLET | Refills: 0 | Status: CANCELLED | OUTPATIENT
Start: 2022-12-06 | End: 2022-12-11

## 2022-12-06 NOTE — TELEPHONE ENCOUNTER
Patient called stating she's experiencing cold like symptoms. She declined first available VV OV and requested the nurse call her back. She can be reached at [phone].

## 2022-12-06 NOTE — TELEPHONE ENCOUNTER
Pt has cough w/ yellow mucus,  congestion, dizziness, sneezing and she is taking the mucinex dm and using her inhaler. COVID home test was negative today. Requesting something be sent to her pharmacy.   [phone]

## 2023-02-13 DIAGNOSIS — Z91.09 ENVIRONMENTAL ALLERGIES: ICD-10-CM

## 2023-02-13 DIAGNOSIS — I10 ESSENTIAL HYPERTENSION: ICD-10-CM

## 2023-02-13 DIAGNOSIS — E11.9 TYPE 2 DIABETES MELLITUS WITHOUT COMPLICATION, WITHOUT LONG-TERM CURRENT USE OF INSULIN (HCC): ICD-10-CM

## 2023-02-13 RX ORDER — FLUTICASONE PROPIONATE 50 MCG
2 SPRAY, SUSPENSION (ML) NASAL
Qty: 48 G | Refills: 1 | Status: SHIPPED | OUTPATIENT
Start: 2023-02-13

## 2023-02-13 RX ORDER — FUROSEMIDE 20 MG/1
20 TABLET ORAL DAILY
Qty: 90 TABLET | Refills: 1 | Status: SHIPPED | OUTPATIENT
Start: 2023-02-13

## 2023-02-13 RX ORDER — SEMAGLUTIDE 1.34 MG/ML
0.5 INJECTION, SOLUTION SUBCUTANEOUS
Qty: 12 PEN | Refills: 1 | Status: SHIPPED | OUTPATIENT
Start: 2023-02-13

## 2023-02-13 NOTE — TELEPHONE ENCOUNTER
Last Visit: 22 with MD Iftikhar Vallejo  Next Appointment: 3/3/23 with MD Iftikhar Vallejo  Previous Refill Encounter(s): 11/3/21 Lasix #90 with 1 refill, 22 Flonase #1 with 5 refills, 22 Ozempic #1 with 3 refills    Requested Prescriptions     Pending Prescriptions Disp Refills    furosemide (LASIX) 20 mg tablet 90 Tablet 1     Sig: Take 1 Tablet by mouth daily. fluticasone propionate (FLONASE) 50 mcg/actuation nasal spray 48 g 1     Si Sprays by Both Nostrils route daily as needed for Allergies. semaglutide (Ozempic) 0.25 mg or 0.5 mg/dose (2 mg/1.5 ml) subq pen 12 Pen 1     Si.5 mg by SubCUTAneous route every seven (7) days. For Pharmacy Admin Tracking Only    Program: Medication Refill  CPA in place:   Recommendation Provided To:    Intervention Detail: New Rx: 3, reason: Patient Preference  Intervention Accepted By:   Tonja Seanz Closed?:   Time Spent (min): 5

## 2023-03-03 ENCOUNTER — OFFICE VISIT (OUTPATIENT)
Dept: FAMILY MEDICINE CLINIC | Age: 68
End: 2023-03-03

## 2023-03-03 VITALS
SYSTOLIC BLOOD PRESSURE: 135 MMHG | OXYGEN SATURATION: 97 % | HEIGHT: 66 IN | TEMPERATURE: 98.1 F | DIASTOLIC BLOOD PRESSURE: 62 MMHG | RESPIRATION RATE: 14 BRPM | WEIGHT: 253.6 LBS | BODY MASS INDEX: 40.76 KG/M2 | HEART RATE: 77 BPM

## 2023-03-03 DIAGNOSIS — J01.80 OTHER ACUTE SINUSITIS, RECURRENCE NOT SPECIFIED: ICD-10-CM

## 2023-03-03 DIAGNOSIS — Z13.820 ENCOUNTER FOR SCREENING FOR OSTEOPOROSIS: ICD-10-CM

## 2023-03-03 DIAGNOSIS — Z78.0 POSTMENOPAUSE: ICD-10-CM

## 2023-03-03 DIAGNOSIS — E11.9 TYPE 2 DIABETES MELLITUS WITHOUT COMPLICATION, WITHOUT LONG-TERM CURRENT USE OF INSULIN (HCC): ICD-10-CM

## 2023-03-03 DIAGNOSIS — F32.A MILD DEPRESSION: ICD-10-CM

## 2023-03-03 DIAGNOSIS — E66.01 MORBID OBESITY (HCC): ICD-10-CM

## 2023-03-03 DIAGNOSIS — M77.8 TENDINITIS OF RIGHT SHOULDER: ICD-10-CM

## 2023-03-03 DIAGNOSIS — E78.2 MIXED HYPERLIPIDEMIA: ICD-10-CM

## 2023-03-03 DIAGNOSIS — Z51.81 ENCOUNTER FOR MEDICATION MONITORING: ICD-10-CM

## 2023-03-03 DIAGNOSIS — I10 ESSENTIAL HYPERTENSION: Primary | ICD-10-CM

## 2023-03-03 DIAGNOSIS — Z12.31 ENCOUNTER FOR SCREENING MAMMOGRAM FOR BREAST CANCER: ICD-10-CM

## 2023-03-03 LAB
GLUCOSE POC: 118 MG/DL
HBA1C MFR BLD HPLC: 5.8 %
LOT NUMBER POC: NORMAL
SARS-COV-2 PCR, POC: NEGATIVE
VALID INTERNAL CONTROL?: YES

## 2023-03-03 RX ORDER — LEVOCETIRIZINE DIHYDROCHLORIDE 5 MG/1
5 TABLET, FILM COATED ORAL
Qty: 90 TABLET | Refills: 3 | Status: SHIPPED | OUTPATIENT
Start: 2023-03-03

## 2023-03-03 RX ORDER — PROMETHAZINE HYDROCHLORIDE AND DEXTROMETHORPHAN HYDROBROMIDE 6.25; 15 MG/5ML; MG/5ML
5 SYRUP ORAL
Qty: 180 ML | Refills: 1 | Status: SHIPPED | OUTPATIENT
Start: 2023-03-03

## 2023-03-03 RX ORDER — SEMAGLUTIDE 1.34 MG/ML
1 INJECTION, SOLUTION SUBCUTANEOUS
Qty: 1 BOX | Refills: 3 | Status: SHIPPED | OUTPATIENT
Start: 2023-03-03

## 2023-03-03 RX ORDER — AMOXICILLIN 500 MG/1
500 CAPSULE ORAL 3 TIMES DAILY
Qty: 30 CAPSULE | Refills: 0 | Status: SHIPPED | OUTPATIENT
Start: 2023-03-03 | End: 2023-03-13

## 2023-03-03 NOTE — PROGRESS NOTES
Patient identified by 2 identifiers. Chief Complaint   Patient presents with    Follow-up    Diabetes     1. Have you been to the ER, urgent care clinic since your last visit? Hospitalized since your last visit? No    2. Have you seen or consulted any other health care providers outside of the [address] [place] [address] since your last visit? Include any pap smears or colon screening.  No

## 2023-03-03 NOTE — PROGRESS NOTES
HISTORY OF PRESENT ILLNESS  Tian Hannah is a 75 y.o. female. HPI   Follow up on chronic medical problems. C/o nasal congestion, headache and pressure in the face and cough for the past 1 week  Taking flonase and mucinex for her symptom. Coughing up thick yellowish phlegm. No fever or chills noted. Has malaise. Throat is scratchy. Has post nasal drainage. No chest pain or SOB. No wheezing noted. Increase cough at night. Took 2 COVID 19 tests this week and both have been negative. Cardiovascular Review:  The patient has hypertension and hyperlipidemia. Diet and Lifestyle: generally follows a low fat low cholesterol diet, generally follows a low sodium diet, follows a diabetic diet regularly, exercises sporadically  Home BP Monitoring: is not measured at home. Pertinent ROS: taking medications as instructed, no medication side effects noted, no TIA's, no chest pain on exertion, no dyspnea on exertion, slight swelling of ankles. Follow up on diabetes and starting on Ozempic. Sally Mat DM type II follow up:  She met with omega and has started on the Ozempic. Tolerating well. Weight has been stable. We discussed increasing dose to help with weight reduction. Compliant w/ diabetic diet but has not been getting in exercise. Has not been checking BS recently. Denies any tingling sensation, polyuria and polydipsia. No blurred vision. No significant weight changes. Feeling well since last OV. Depression Review:  Patient is seen for followup of depression. Treatment includes Celexa. Has been isolated during the winter months which has made her feel more depressed intermittently. Discussed increase exercise and increase social interaction with friends, family and social activities with groups or [employer]. Compliant with taking celexa. Ongoing symptoms include fatigue and intermittent anxiety and depressed mood. No SI/HI. She denies insomnia and hopelessness.    She experiences the following side effects from the treatment: none. Patient Active Problem List   Diagnosis Code    Environmental allergies Z91.09    Depression F32. 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 90 Tablet 1    fluticasone propionate (FLONASE) 50 mcg/actuation nasal spray 2 Sprays by Both Nostrils route daily as needed for Allergies. 48 g 1    semaglutide (Ozempic) 0.25 mg or 0.5 mg/dose (2 mg/1.5 ml) subq pen 0.5 mg by SubCUTAneous route every seven (7) days. 12 Pen 1    albuterol (ProAir HFA) 90 mcg/actuation inhaler Take 2 Puffs by inhalation every four (4) hours as needed for Wheezing or Shortness of Breath. Please instruct Pt on how to use an inhaler. 1 Each 3    ALPRAZolam (XANAX) 0.5 mg tablet TAKE ONE TABLET BY MOUTH AT NIGHTLY AS NEEDED 90 Tablet 0    citalopram (CELEXA) 20 mg tablet Take 1 Tablet by mouth daily. 90 Tablet 3    triamcinolone acetonide (KENALOG) 0.1 % topical cream APPLY TO AFFECTED AREA(S) TWO TIMES A DAY AS NEEDED FOR SKIN IRRITATION, USE A THIN LAYER TO 15 g 0    fexofenadine (ALLEGRA) 180 mg tablet Take 1 Tablet by mouth daily as needed for Allergies. nystatin (MYCOSTATIN) powder Apply  to affected area two (2) times daily as needed (rash under breasts). 60 g 1    omeprazole (PRILOSEC) 40 mg capsule Take 1 Capsule by mouth daily. 90 Capsule 3    Psyllium Husk-Aspartame (Metamucil Sugar-Free, aspart,) 3.4 gram/5.8 gram powd Take daily.  283 g 0    pravastatin (PRAVACHOL) 20 mg tablet TAKE TWO TABLETS BY MOUTH ONCE NIGHTLY 180 Tablet 3 losartan-hydroCHLOROthiazide (HYZAAR) 50-12.5 mg per tablet TAKE ONE TABLET BY MOUTH DAILY 90 Tablet 3    cyanocobalamin 1,000 mcg tablet Take 1,000 mcg by mouth daily. lancets misc Monitor BG daily  Dx: E11.9. Provide with lancets insurance covers 100 Each 3    Blood-Glucose Meter monitoring kit Monitor BG daily  Dx: E11.9. Provide with glucometer insurance covers 1 Kit 0    glucose blood VI test strips (blood glucose test) strip Monitor BG daily  Dx: E11.9  Provide with test strips insurance covers 100 Strip 3    OTHER Take 1 Tablet by mouth daily. Elderberry/VitaminC/Zinc/Vitamin D3 combination product      terbinafine HCL (LAMISIL) 1 % topical cream Apply  to affected area two (2) times daily as needed. 30 g 0    melatonin 3 mg tablet Take 3 mg by mouth nightly as needed. acetaminophen (TYLENOL) 650 mg TbER Take 1,300 mg by mouth every six (6) hours as needed for Pain. CALCIUM CARB/VIT D3/MINERALS (CALTRATE PLUS PO) Take 1 Tab by mouth daily. Allergies   Allergen Reactions    Ciprofloxacin Other (comments)     Insomnia and hot flashes    Demerol [Meperidine] Other (comments)     hallucinations    Doxycycline Unknown (comments)       \"Feeling flushed\"    Floxin [Ofloxacin] Other (comments)     insomnia    Lisinopril Cough    Phenergan-Codeine [Promethazine-Codeine] Itching     Patient stated she started itching when she was tapering off of medication.     Prednisone Other (comments)     Has feelings of depression, \"weird thoughts\"         Past Medical History:   Diagnosis Date    C section 3/12/2010    Depression 3/12/2010    Diabetes ([facility] [address]     Environmental allergies 3/12/2010    GERD (gastroesophageal reflux disease)     H/O: hysterectomy 3/12/2010    High cholesterol 3/12/2010    HTN (hypertension) 3/12/2010    Pneumonia 04/2017         Past Surgical History:   Procedure Laterality Date    COLONOSCOPY N/A 7/15/2020    COLONOSCOPY performed by Cathleen Roque MD at [facility] ENDOSCOPY ENDOSCOPY, COLON, DIAGNOSTIC  2006    repeat 10 yrs (Dr. Alfred Kay)    HX BREAST BIOPSY  1980    bilateral negative    HX BREAST BIOPSY  1986    left negative    HX  SECTION      x 3    HX CORNEAL TRANSPLANT  2011    left eye-     HX CYSTOSTOMY  3/2015    HX HYSTERECTOMY  1998           Family History   Problem Relation Age of Onset    Hypertension Mother     Other Mother         obesity, chf    Heart Disease Mother         CHF    Kidney Disease Mother     High Cholesterol Mother     Cancer Father         lung    Breast Cancer Sister     Mult Sclerosis Sister     High Cholesterol Sister     Hypertension Sister     Cancer Sister         breast    Hypertension Brother     Liver Disease Brother         Hep C    High Cholesterol Brother     Cancer Brother         colon    Hypertension Sister     Diabetes Sister     High Cholesterol Sister     Hypertension Sister     Other Sister         obese    High Cholesterol Sister     Cancer Niece         cervical       Social History     Tobacco Use    Smoking status: Never    Smokeless tobacco: Never   Substance Use Topics    Alcohol use:  Yes     Alcohol/week: 0.0 standard drinks     Comment: occasional        Lab Results   Component Value Date/Time    WBC 14.9 (H) 2022 01:28 PM    HGB 14.2 2022 01:28 PM    Hemoglobin (POC) 15.0 2016 05:26 PM    HCT 44.8 2022 01:28 PM    Hematocrit (POC) 44 2016 05:26 PM    PLATELET 592  01:28 PM    MCV 92.9 2022 01:28 PM     Lab Results   Component Value Date/Time    Cholesterol, total 156 2022 09:59 AM    HDL Cholesterol 43 2022 09:59 AM    LDL, calculated 75.8 2022 09:59 AM    Triglyceride 186 (H) 2022 09:59 AM    CHOL/HDL Ratio 3.6 2022 09:59 AM     Lab Results   Component Value Date/Time    TSH 2.450 2019 01:29 PM      Lab Results   Component Value Date/Time    Sodium 139 2022 09:59 AM    Potassium 4.4 2022 09:59 AM Chloride 104 09/02/2022 09:59 AM    CO2 33 (H) 09/02/2022 09:59 AM    Anion gap 2 (L) 09/02/2022 09:59 AM    Glucose 100 09/02/2022 09:59 AM    BUN 17 09/02/2022 09:59 AM    Creatinine 0.89 09/02/2022 09:59 AM    BUN/Creatinine ratio 19 09/02/2022 09:59 AM    GFR est AA >60 09/02/2022 09:59 AM    GFR est non-AA >60 09/02/2022 09:59 AM    Calcium 9.6 09/02/2022 09:59 AM    Bilirubin, total 0.2 09/02/2022 09:59 AM    ALT (SGPT) 20 09/02/2022 09:59 AM    Alk. phosphatase 75 09/02/2022 09:59 AM    Protein, total 6.6 09/02/2022 09:59 AM    Albumin 3.9 09/02/2022 09:59 AM    Globulin 2.7 09/02/2022 09:59 AM    A-G Ratio 1.4 09/02/2022 09:59 AM      Lab Results   Component Value Date/Time    Hemoglobin A1c 5.9 (H) 01/09/2021 08:58 AM    Hemoglobin A1c (POC) 5.5 09/02/2022 10:50 AM         Review of Systems   Constitutional:  Negative for chills, fever and malaise/fatigue. HENT:  Positive for congestion and sinus pain. Eyes:  Negative for blurred vision. Respiratory:  Positive for cough and sputum production. Negative for shortness of breath and wheezing. Cardiovascular:  Negative for chest pain, palpitations and leg swelling. Gastrointestinal:  Negative for abdominal pain, constipation and heartburn. Genitourinary:  Negative for dysuria, frequency and urgency. Musculoskeletal:  Positive for joint pain. Negative for back pain. Intermittent right shoulder pain over the past few weeks. Gets achy with sitting at her computer and pain radiates down the arm. No numbness or weakness. Neurological:  Negative for dizziness, tingling and headaches. Endo/Heme/Allergies:  Positive for environmental allergies (has been using allegra). Psychiatric/Behavioral:  Negative for substance abuse and suicidal ideas. The patient does not have insomnia. Physical Exam  Vitals and nursing note reviewed. Constitutional:       Appearance: Normal appearance. She is well-developed.       Comments: /62 Pulse 77   Temp 98.1 °F (36.7 °C)   Resp 14   Ht 5' 6\" (1.676 m)   Wt 253 lb 9.6 oz (115 kg)   LMP 11/19/1998   SpO2 97%   BMI 40.93 kg/m²    HENT:      Right Ear: Tympanic membrane and ear canal normal.      Left Ear: Tympanic membrane and ear canal normal.      Nose: Mucosal edema and rhinorrhea present. Mouth/Throat:      Pharynx: No posterior oropharyngeal erythema. Neck:      Thyroid: No thyromegaly. Trachea: Trachea normal.   Cardiovascular:      Rate and Rhythm: Normal rate and regular rhythm. Heart sounds: Normal heart sounds. Pulmonary:      Effort: Pulmonary effort is normal.      Breath sounds: Normal breath sounds. No wheezing, rhonchi or rales. Abdominal:      General: Bowel sounds are normal.      Palpations: Abdomen is soft. There is no mass. Tenderness: There is no abdominal tenderness. Musculoskeletal:      Right shoulder: Tenderness and bony tenderness present. Decreased range of motion. Decreased strength. Abnormal pulse. Cervical back: Full passive range of motion without pain, normal range of motion and neck supple. No edema. Right lower leg: No edema. Left lower leg: No edema. Lymphadenopathy:      Cervical: No cervical adenopathy. Skin:     General: Skin is warm and dry. Neurological:      General: No focal deficit present. Mental Status: She is alert and oriented to person, place, and time. Psychiatric:         Mood and Affect: Mood normal.     ASSESSMENT and PLAN  Diagnoses and all orders for this visit:    1. Essential hypertension  Stable at goal.    2. Type 2 diabetes mellitus without complication, without long-term current use of insulin ([facility])  A1c 5.8%. Continue to monitor. Work on diet and exercise. -     AMB POC HEMOGLOBIN A1C  -     AMB POC GLUCOSE, QUANTITATIVE, BLOOD  -     semaglutide (Ozempic) 1 mg/dose (2 mg/1.5 mL) sub-q pen; 1 mg by SubCUTAneous route every seven (7) days.     3. Mixed hyperlipidemia  -     LIPID PANEL; Future    4. Mild depression  Exercises and social stimulation as discussed above. 5. Tendinitis of right shoulder  Instructions for exercises given and reviewed with pt. Pt also to use heat to the area 3-4 times a day over the next several days until sx are improved. 6. Other acute sinusitis, recurrence not specified  -     POCT COVID-19, SARS-COV-2, PCR  -  negative. -     amoxicillin (AMOXIL) 500 mg capsule; Take 1 Capsule by mouth three (3) times daily for 10 days. -     promethazine-dextromethorphan (PROMETHAZINE-DM) 6.25-15 mg/5 mL syrup; Take 5 mL by mouth every six (6) hours as needed for Cough. -     levocetirizine (XYZAL) 5 mg tablet; Take 1 Tablet by mouth daily as needed for Allergies. 7. Encounter for medication monitoring  -     METABOLIC PANEL, BASIC; Future  -     CBC W/O DIFF; Future    8. Morbid Obesity  I have reviewed/discussed the above normal BMI with the patient. I have recommended the following interventions: dietary management education, guidance, and counseling . Increased ozmepic to hep with weight loss. Follow-up and Dispositions    Return in about 4 months (around 7/3/2023). reviewed diet, exercise and weight control  cardiovascular risk and specific lipid/LDL goals reviewed  reviewed medications and side effects in detail  specific diabetic recommendations: low cholesterol diet, weight control and daily exercise discussed, all medications, side effects and compliance discussed carefully, annual eye examinations at Ophthalmology discussed, glycohemoglobin and other lab monitoring discussed, and long term diabetic complications discussed      I have discussed diagnosis listed in this note with pt and/or family. I have discussed treatment plans and options and the risk/benefit analysis of those options, including safe use of medications and possible medication side effects.    Through the use of shared decision making we have agreed to the above plan. The patient has received an after-visit summary and questions were answered concerning future plans and follow up. Advise pt of any urgent changes then to proceed to the ER.

## 2023-03-04 LAB
ANION GAP SERPL CALC-SCNC: 6 MMOL/L (ref 5–15)
BUN SERPL-MCNC: 16 MG/DL (ref 6–20)
BUN/CREAT SERPL: 18 (ref 12–20)
CALCIUM SERPL-MCNC: 9.8 MG/DL (ref 8.5–10.1)
CHLORIDE SERPL-SCNC: 102 MMOL/L (ref 97–108)
CHOLEST SERPL-MCNC: 144 MG/DL
CO2 SERPL-SCNC: 35 MMOL/L (ref 21–32)
CREAT SERPL-MCNC: 0.89 MG/DL (ref 0.55–1.02)
ERYTHROCYTE [DISTWIDTH] IN BLOOD BY AUTOMATED COUNT: 13.6 % (ref 11.5–14.5)
GLUCOSE SERPL-MCNC: 87 MG/DL (ref 65–100)
HCT VFR BLD AUTO: 47.3 % (ref 35–47)
HDLC SERPL-MCNC: 37 MG/DL
HDLC SERPL: 3.9 (ref 0–5)
HGB BLD-MCNC: 14.6 G/DL (ref 11.5–16)
LDLC SERPL CALC-MCNC: 67.8 MG/DL (ref 0–100)
MCH RBC QN AUTO: 28.7 PG (ref 26–34)
MCHC RBC AUTO-ENTMCNC: 30.9 G/DL (ref 30–36.5)
MCV RBC AUTO: 92.9 FL (ref 80–99)
NRBC # BLD: 0.02 K/UL (ref 0–0.01)
NRBC BLD-RTO: 0.2 PER 100 WBC
PLATELET # BLD AUTO: 365 K/UL (ref 150–400)
PMV BLD AUTO: 10.6 FL (ref 8.9–12.9)
POTASSIUM SERPL-SCNC: 3.8 MMOL/L (ref 3.5–5.1)
RBC # BLD AUTO: 5.09 M/UL (ref 3.8–5.2)
SODIUM SERPL-SCNC: 143 MMOL/L (ref 136–145)
TRIGL SERPL-MCNC: 196 MG/DL (ref ?–150)
VLDLC SERPL CALC-MCNC: 39.2 MG/DL
WBC # BLD AUTO: 12.1 K/UL (ref 3.6–11)

## 2023-04-17 DIAGNOSIS — E78.2 MIXED HYPERLIPIDEMIA: ICD-10-CM

## 2023-04-17 DIAGNOSIS — I10 ESSENTIAL HYPERTENSION: ICD-10-CM

## 2023-04-17 RX ORDER — PRAVASTATIN SODIUM 20 MG/1
40 TABLET ORAL
Qty: 180 TABLET | Refills: 3 | Status: SHIPPED | OUTPATIENT
Start: 2023-04-17

## 2023-04-17 RX ORDER — LOSARTAN POTASSIUM AND HYDROCHLOROTHIAZIDE 12.5; 5 MG/1; MG/1
1 TABLET ORAL DAILY
Qty: 90 TABLET | Refills: 3 | Status: SHIPPED | OUTPATIENT
Start: 2023-04-17

## 2023-04-17 NOTE — TELEPHONE ENCOUNTER
Last Visit: 3/3/23 with MD Mary Gloria  Next Appointment: 7/3/23 with MD Mary Gloria  Previous Refill Encounter(s): 4/18/22 90 d/s with 3 refills    Requested Prescriptions     Pending Prescriptions Disp Refills    losartan-hydroCHLOROthiazide (HYZAAR) 50-12.5 mg per tablet 90 Tablet 3     Sig: Take 1 Tablet by mouth daily. pravastatin (PRAVACHOL) 20 mg tablet 180 Tablet 3     Sig: Take 2 Tablets by mouth nightly. For Pharmacy Admin Tracking Only    Program: Medication Refill  CPA in place:   Recommendation Provided To:    Intervention Detail: New Rx: 2, reason: Patient Preference  Intervention Accepted By:   Marcus Daily Closed?:   Time Spent (min): 5

## 2023-06-20 RX ORDER — OMEPRAZOLE 40 MG/1
40 CAPSULE, DELAYED RELEASE ORAL DAILY
Qty: 90 CAPSULE | Refills: 3 | Status: SHIPPED | OUTPATIENT
Start: 2023-06-20

## 2023-06-20 NOTE — TELEPHONE ENCOUNTER
Last appointment: 3/3/23  Next appointment: 7/3/23  Previous refill encounter(s): 6/13/22 #90 with 3 refills    Requested Prescriptions     Pending Prescriptions Disp Refills    omeprazole (PRILOSEC) 40 MG delayed release capsule 90 capsule 3     Sig: Take 1 capsule by mouth daily         For Pharmacy Admin Tracking Only    Program: Medication Refill  CPA in place:    Recommendation Provided To:    Intervention Detail: New Rx: 1, reason: Patient Preference  Intervention Accepted By:   Barney Oconnellt Closed?:    Time Spent (min): 5

## 2023-06-29 RX ORDER — SEMAGLUTIDE 1.34 MG/ML
1 INJECTION, SOLUTION SUBCUTANEOUS
Qty: 3 ML | Refills: 3 | Status: SHIPPED | OUTPATIENT
Start: 2023-06-29

## 2023-07-06 ENCOUNTER — HOSPITAL ENCOUNTER (OUTPATIENT)
Facility: HOSPITAL | Age: 68
Discharge: HOME | End: 2023-07-06
Attending: FAMILY MEDICINE
Payer: MEDICARE

## 2023-07-06 DIAGNOSIS — Z12.31 VISIT FOR SCREENING MAMMOGRAM: ICD-10-CM

## 2023-07-06 PROCEDURE — 77063 BREAST TOMOSYNTHESIS BI: CPT

## 2023-07-17 ENCOUNTER — OFFICE VISIT (OUTPATIENT)
Age: 68
End: 2023-07-17
Payer: MEDICARE

## 2023-07-17 VITALS
BODY MASS INDEX: 40.72 KG/M2 | HEIGHT: 66 IN | WEIGHT: 253.4 LBS | RESPIRATION RATE: 20 BRPM | TEMPERATURE: 98.5 F | SYSTOLIC BLOOD PRESSURE: 124 MMHG | OXYGEN SATURATION: 95 % | HEART RATE: 70 BPM | DIASTOLIC BLOOD PRESSURE: 62 MMHG

## 2023-07-17 DIAGNOSIS — B37.2 CANDIDAL INTERTRIGO: ICD-10-CM

## 2023-07-17 DIAGNOSIS — J30.89 ENVIRONMENTAL AND SEASONAL ALLERGIES: ICD-10-CM

## 2023-07-17 DIAGNOSIS — E78.2 MIXED HYPERLIPIDEMIA: ICD-10-CM

## 2023-07-17 DIAGNOSIS — Z79.899 ENCOUNTER FOR LONG-TERM (CURRENT) USE OF MEDICATIONS: ICD-10-CM

## 2023-07-17 DIAGNOSIS — Z13.820 ENCOUNTER FOR SCREENING FOR OSTEOPOROSIS: ICD-10-CM

## 2023-07-17 DIAGNOSIS — E11.9 TYPE 2 DIABETES MELLITUS WITHOUT COMPLICATION, WITHOUT LONG-TERM CURRENT USE OF INSULIN (HCC): ICD-10-CM

## 2023-07-17 DIAGNOSIS — I10 ESSENTIAL (PRIMARY) HYPERTENSION: Primary | ICD-10-CM

## 2023-07-17 DIAGNOSIS — F32.0 MAJOR DEPRESSIVE DISORDER, SINGLE EPISODE, MILD (HCC): ICD-10-CM

## 2023-07-17 DIAGNOSIS — E66.9 NON MORBID OBESITY: ICD-10-CM

## 2023-07-17 DIAGNOSIS — Z78.0 POSTMENOPAUSE: ICD-10-CM

## 2023-07-17 LAB
GLUCOSE, POC: 111 MG/DL
HBA1C MFR BLD: 5.6 %

## 2023-07-17 PROCEDURE — 3078F DIAST BP <80 MM HG: CPT | Performed by: FAMILY MEDICINE

## 2023-07-17 PROCEDURE — 99214 OFFICE O/P EST MOD 30 MIN: CPT | Performed by: FAMILY MEDICINE

## 2023-07-17 PROCEDURE — 1123F ACP DISCUSS/DSCN MKR DOCD: CPT | Performed by: FAMILY MEDICINE

## 2023-07-17 PROCEDURE — 3017F COLORECTAL CA SCREEN DOC REV: CPT | Performed by: FAMILY MEDICINE

## 2023-07-17 PROCEDURE — PBSHW AMB POC HEMOGLOBIN A1C: Performed by: FAMILY MEDICINE

## 2023-07-17 PROCEDURE — 83036 HEMOGLOBIN GLYCOSYLATED A1C: CPT | Performed by: FAMILY MEDICINE

## 2023-07-17 PROCEDURE — 2022F DILAT RTA XM EVC RTNOPTHY: CPT | Performed by: FAMILY MEDICINE

## 2023-07-17 PROCEDURE — G8399 PT W/DXA RESULTS DOCUMENT: HCPCS | Performed by: FAMILY MEDICINE

## 2023-07-17 PROCEDURE — 3074F SYST BP LT 130 MM HG: CPT | Performed by: FAMILY MEDICINE

## 2023-07-17 PROCEDURE — PBSHW AMB POC GLUCOSE BLOOD, BY GLUCOSE MONITORING DEVICE: Performed by: FAMILY MEDICINE

## 2023-07-17 PROCEDURE — 1036F TOBACCO NON-USER: CPT | Performed by: FAMILY MEDICINE

## 2023-07-17 PROCEDURE — 3046F HEMOGLOBIN A1C LEVEL >9.0%: CPT | Performed by: FAMILY MEDICINE

## 2023-07-17 PROCEDURE — G8417 CALC BMI ABV UP PARAM F/U: HCPCS | Performed by: FAMILY MEDICINE

## 2023-07-17 PROCEDURE — 82962 GLUCOSE BLOOD TEST: CPT | Performed by: FAMILY MEDICINE

## 2023-07-17 PROCEDURE — G8427 DOCREV CUR MEDS BY ELIG CLIN: HCPCS | Performed by: FAMILY MEDICINE

## 2023-07-17 PROCEDURE — 1090F PRES/ABSN URINE INCON ASSESS: CPT | Performed by: FAMILY MEDICINE

## 2023-07-17 RX ORDER — FLUTICASONE PROPIONATE 50 MCG
2 SPRAY, SUSPENSION (ML) NASAL DAILY PRN
Qty: 16 G | Refills: 3 | Status: SHIPPED | OUTPATIENT
Start: 2023-07-17

## 2023-07-17 RX ORDER — ALBUTEROL SULFATE 90 UG/1
2 AEROSOL, METERED RESPIRATORY (INHALATION) EVERY 4 HOURS PRN
Qty: 18 G | Refills: 3 | Status: SHIPPED | OUTPATIENT
Start: 2023-07-17

## 2023-07-17 RX ORDER — NYSTATIN 100000 [USP'U]/G
POWDER TOPICAL 2 TIMES DAILY PRN
Qty: 60 G | Refills: 1 | Status: SHIPPED | OUTPATIENT
Start: 2023-07-17

## 2023-07-17 SDOH — ECONOMIC STABILITY: FOOD INSECURITY: WITHIN THE PAST 12 MONTHS, THE FOOD YOU BOUGHT JUST DIDN'T LAST AND YOU DIDN'T HAVE MONEY TO GET MORE.: NEVER TRUE

## 2023-07-17 SDOH — ECONOMIC STABILITY: INCOME INSECURITY: HOW HARD IS IT FOR YOU TO PAY FOR THE VERY BASICS LIKE FOOD, HOUSING, MEDICAL CARE, AND HEATING?: NOT HARD AT ALL

## 2023-07-17 SDOH — ECONOMIC STABILITY: FOOD INSECURITY: WITHIN THE PAST 12 MONTHS, YOU WORRIED THAT YOUR FOOD WOULD RUN OUT BEFORE YOU GOT MONEY TO BUY MORE.: NEVER TRUE

## 2023-07-17 SDOH — ECONOMIC STABILITY: HOUSING INSECURITY
IN THE LAST 12 MONTHS, WAS THERE A TIME WHEN YOU DID NOT HAVE A STEADY PLACE TO SLEEP OR SLEPT IN A SHELTER (INCLUDING NOW)?: NO

## 2023-07-17 ASSESSMENT — ENCOUNTER SYMPTOMS
CONSTIPATION: 0
CHEST TIGHTNESS: 0
COUGH: 0
RHINORRHEA: 0
SHORTNESS OF BREATH: 0
BLOOD IN STOOL: 0
ABDOMINAL PAIN: 0

## 2023-07-17 ASSESSMENT — PATIENT HEALTH QUESTIONNAIRE - PHQ9
3. TROUBLE FALLING OR STAYING ASLEEP: 0
9. THOUGHTS THAT YOU WOULD BE BETTER OFF DEAD, OR OF HURTING YOURSELF: 0
SUM OF ALL RESPONSES TO PHQ QUESTIONS 1-9: 5
1. LITTLE INTEREST OR PLEASURE IN DOING THINGS: 1
SUM OF ALL RESPONSES TO PHQ9 QUESTIONS 1 & 2: 2
4. FEELING TIRED OR HAVING LITTLE ENERGY: 2
SUM OF ALL RESPONSES TO PHQ QUESTIONS 1-9: 5
6. FEELING BAD ABOUT YOURSELF - OR THAT YOU ARE A FAILURE OR HAVE LET YOURSELF OR YOUR FAMILY DOWN: 0
2. FEELING DOWN, DEPRESSED OR HOPELESS: 1
SUM OF ALL RESPONSES TO PHQ QUESTIONS 1-9: 5
8. MOVING OR SPEAKING SO SLOWLY THAT OTHER PEOPLE COULD HAVE NOTICED. OR THE OPPOSITE, BEING SO FIGETY OR RESTLESS THAT YOU HAVE BEEN MOVING AROUND A LOT MORE THAN USUAL: 0
SUM OF ALL RESPONSES TO PHQ QUESTIONS 1-9: 5
7. TROUBLE CONCENTRATING ON THINGS, SUCH AS READING THE NEWSPAPER OR WATCHING TELEVISION: 0
10. IF YOU CHECKED OFF ANY PROBLEMS, HOW DIFFICULT HAVE THESE PROBLEMS MADE IT FOR YOU TO DO YOUR WORK, TAKE CARE OF THINGS AT HOME, OR GET ALONG WITH OTHER PEOPLE: 0
5. POOR APPETITE OR OVEREATING: 1

## 2023-07-17 NOTE — PROGRESS NOTES
Chief Complaint   Patient presents with    Follow-up       1. \"Have you been to the ER, urgent care clinic since your last visit? Hospitalized since your last visit? \" No    2. \"Have you seen or consulted any other health care providers outside of the [address] [place] [address] since your last visit? \" No     3. For patients aged 43-73: Has the patient had a colonoscopy / FIT/ Cologuard? Yes      If the patient is female:    4. For patients aged 43-66: Has the patient had a mammogram within the past 2 years? Yes      5. For patients aged 21-65: Has the patient had a pap smear?  No     Health Maintenance Due   Topic Date Due    COVID-19 Vaccine (5 - Booster for Moderna series) 06/20/2023

## 2023-07-17 NOTE — PROGRESS NOTES
Subjective:      Patient ID: Gertrudis Tai is a 70 y.o. female. HPI  Follow up on chronic medical problems. Cardiovascular Review:  The patient has hypertension and hyperlipidemia. Diet and Lifestyle: generally follows a low fat low cholesterol diet, generally follows a low sodium diet, follows a diabetic diet regularly, exercises sporadically  Home BP Monitoring: is not measured at home. Pertinent ROS: taking medications as instructed, no medication side effects noted, no TIA's, no chest pain on exertion, no dyspnea on exertion, slight swelling of ankles. Follow up on diabetes and starting on Ozempic. Jimmie Closs DM type II follow up:  She met with ryan and has started on the Ozempic. Tolerating well. Weight has been stable. We discussed increasing dose to help with weight reduction. Compliant w/ diabetic diet but has not been getting in exercise. Has not been checking BS recently. Denies any tingling sensation, polyuria and polydipsia. No blurred vision. No significant weight changes. Feeling well since last OV. Depression Review:  Patient is seen for followup of depression. Treatment includes Celexa. Has been feeling more isolated which has made her feel more depressed intermittently. Discussed increase exercise and increase social interaction with friends, family and social activities with groups or [employer]. Compliant with taking celexa. Declined referral for therapy and does not thinks she need more medication. Ongoing symptoms include fatigue and intermittent anxiety and depressed mood. No SI/HI. She denies insomnia and hopelessness. She experiences the following side effects from the treatment: none.     Past Medical History:   Diagnosis Date    Depression 3/12/2010    Diabetes ([address])     Environmental allergies 3/12/2010    GERD (gastroesophageal reflux disease)     H/O: hysterectomy 3/12/2010    High cholesterol 3/12/2010    HTN (hypertension) 3/12/2010    Pneumonia

## 2023-07-18 LAB
ALBUMIN SERPL-MCNC: 3.8 G/DL (ref 3.5–5)
ALBUMIN/GLOB SERPL: 1.3 (ref 1.1–2.2)
ALP SERPL-CCNC: 73 U/L (ref 45–117)
ALT SERPL-CCNC: 20 U/L (ref 12–78)
ANION GAP SERPL CALC-SCNC: 6 MMOL/L (ref 5–15)
APPEARANCE UR: ABNORMAL
AST SERPL-CCNC: 12 U/L (ref 15–37)
BACTERIA URNS QL MICRO: ABNORMAL /HPF
BILIRUB SERPL-MCNC: 0.4 MG/DL (ref 0.2–1)
BILIRUB UR QL: NEGATIVE
BUN SERPL-MCNC: 17 MG/DL (ref 6–20)
BUN/CREAT SERPL: 20 (ref 12–20)
CALCIUM SERPL-MCNC: 9.6 MG/DL (ref 8.5–10.1)
CAOX CRY URNS QL MICRO: ABNORMAL
CHLORIDE SERPL-SCNC: 104 MMOL/L (ref 97–108)
CHOLEST SERPL-MCNC: 170 MG/DL
CO2 SERPL-SCNC: 30 MMOL/L (ref 21–32)
COLOR UR: ABNORMAL
CREAT SERPL-MCNC: 0.83 MG/DL (ref 0.55–1.02)
EPITH CASTS URNS QL MICRO: ABNORMAL /LPF
GLOBULIN SER CALC-MCNC: 2.9 G/DL (ref 2–4)
GLUCOSE SERPL-MCNC: 93 MG/DL (ref 65–100)
GLUCOSE UR STRIP.AUTO-MCNC: NEGATIVE MG/DL
HDLC SERPL-MCNC: 44 MG/DL
HDLC SERPL: 3.9 (ref 0–5)
HGB UR QL STRIP: ABNORMAL
KETONES UR QL STRIP.AUTO: NEGATIVE MG/DL
LDLC SERPL CALC-MCNC: 101.4 MG/DL (ref 0–100)
LEUKOCYTE ESTERASE UR QL STRIP.AUTO: ABNORMAL
NITRITE UR QL STRIP.AUTO: NEGATIVE
PH UR STRIP: 6 (ref 5–8)
POTASSIUM SERPL-SCNC: 4.2 MMOL/L (ref 3.5–5.1)
PROT SERPL-MCNC: 6.7 G/DL (ref 6.4–8.2)
PROT UR STRIP-MCNC: NEGATIVE MG/DL
RBC #/AREA URNS HPF: ABNORMAL /HPF (ref 0–5)
SODIUM SERPL-SCNC: 140 MMOL/L (ref 136–145)
SP GR UR REFRACTOMETRY: 1.02 (ref 1–1.03)
TRIGL SERPL-MCNC: 123 MG/DL
UROBILINOGEN UR QL STRIP.AUTO: 0.2 EU/DL (ref 0.2–1)
VLDLC SERPL CALC-MCNC: 24.6 MG/DL
WBC URNS QL MICRO: ABNORMAL /HPF (ref 0–4)

## 2023-08-08 ENCOUNTER — HOSPITAL ENCOUNTER (OUTPATIENT)
Facility: HOSPITAL | Age: 68
Discharge: HOME | End: 2023-08-11
Attending: FAMILY MEDICINE
Payer: MEDICARE

## 2023-08-08 DIAGNOSIS — R92.8 ABNORMAL MAMMOGRAM: ICD-10-CM

## 2023-08-08 PROCEDURE — G0279 TOMOSYNTHESIS, MAMMO: HCPCS

## 2023-08-08 PROCEDURE — 76642 ULTRASOUND BREAST LIMITED: CPT

## 2023-08-22 RX ORDER — FUROSEMIDE 20 MG/1
20 TABLET ORAL DAILY
Qty: 90 TABLET | Refills: 3 | Status: SHIPPED | OUTPATIENT
Start: 2023-08-22

## 2023-08-22 NOTE — TELEPHONE ENCOUNTER
Last appointment: 7/17/23  Next appointment: 11/22/23  Previous refill encounter(s): 2/13/23 #90 with 1 refill    Requested Prescriptions     Pending Prescriptions Disp Refills    furosemide (LASIX) 20 MG tablet 90 tablet 3     Sig: Take 1 tablet by mouth daily         For Pharmacy Admin Tracking Only    Program: Medication Refill  CPA in place:    Recommendation Provided To:    Intervention Detail: New Rx: 1, reason: Patient Preference  Intervention Accepted By:   Creed  Closed?:    Time Spent (min): 5

## 2023-09-27 ENCOUNTER — HOSPITAL ENCOUNTER (OUTPATIENT)
Facility: HOSPITAL | Age: 68
Discharge: HOME | End: 2023-09-30
Attending: FAMILY MEDICINE

## 2023-09-27 DIAGNOSIS — Z78.0 POSTMENOPAUSE: ICD-10-CM

## 2023-09-27 DIAGNOSIS — Z13.820 ENCOUNTER FOR SCREENING FOR OSTEOPOROSIS: ICD-10-CM

## 2023-10-03 ENCOUNTER — HOSPITAL ENCOUNTER (OUTPATIENT)
Facility: HOSPITAL | Age: 68
Discharge: HOME | End: 2023-10-06
Attending: FAMILY MEDICINE
Payer: MEDICARE

## 2023-10-03 PROCEDURE — 77080 DXA BONE DENSITY AXIAL: CPT

## 2023-10-20 RX ORDER — SEMAGLUTIDE 1.34 MG/ML
1 INJECTION, SOLUTION SUBCUTANEOUS
Qty: 3 ML | Refills: 3 | Status: SHIPPED | OUTPATIENT
Start: 2023-10-20

## 2023-11-16 RX ORDER — CITALOPRAM 20 MG/1
20 TABLET ORAL DAILY
Qty: 90 TABLET | Refills: 0 | Status: SHIPPED | OUTPATIENT
Start: 2023-11-16

## 2023-11-16 NOTE — TELEPHONE ENCOUNTER
Last appointment: 7/17/23  Next appointment: 11/22/23 pt cancelled appt  Previous refill encounter(s): 11/14/22    Requested Prescriptions     Pending Prescriptions Disp Refills    citalopram (CELEXA) 20 MG tablet [Pharmacy Med Name: CITALOPRAM HBR 20 MG TABLET] 90 tablet 0     Sig: TAKE ONE TABLET BY MOUTH DAILY         For Pharmacy Admin Tracking Only    Program: Medication Refill  CPA in place:    Recommendation Provided To:    Intervention Detail: New Rx: 1, reason: Patient Preference  Intervention Accepted By:   Floyd Grubbs Closed?:    Time Spent (min): 5

## 2023-11-28 RX ORDER — SEMAGLUTIDE 1.34 MG/ML
INJECTION, SOLUTION SUBCUTANEOUS
Qty: 3 ML | Refills: 3 | OUTPATIENT
Start: 2023-11-28

## 2023-11-28 NOTE — TELEPHONE ENCOUNTER
Ozempic was sent on 10/20/23 for #3ml with 3 refills      For Pharmacy Admin Tracking Only    Program: Medication Refill  CPA in place:    Recommendation Provided To:   Intervention Detail: New Rx: 1, reason: Patient Preference  Intervention Accepted By:   Gap Closed?:    Time Spent (min): 5

## 2023-12-01 ENCOUNTER — TELEPHONE (OUTPATIENT)
Age: 68
End: 2023-12-01

## 2023-12-01 NOTE — TELEPHONE ENCOUNTER
Pt appointment changed to 1/15/24 at 11:00 am for AWV with Dr. Phill Murrieta.   Pt scheduled for labs only the Friday before her AWV, 1/12/24 at 2 pm.

## 2023-12-01 NOTE — TELEPHONE ENCOUNTER
----- Message from Sharlet Patches sent at 12/1/2023 10:23 AM EST -----  Subject: Message to Provider    QUESTIONS  Information for Provider? pt is wanting to f/u about a refill that was   supposed to be sent but never was? and would also like to know if key   is okay with her coming in prior to 2/6 to get labs done for awv so she   doesn't have to fast that whole day. please call pt. if you can't reach pt   on home number, call cell please  ---------------------------------------------------------------------------  --------------  Keke [place] Cristiano  [phone]; Do not leave any message, patient will call back for answer  ---------------------------------------------------------------------------  --------------  SCRIPT ANSWERS  Relationship to Patient?  Self

## 2023-12-01 NOTE — TELEPHONE ENCOUNTER
Pt was called to  the Ozempic 0.5 MG sample per . The pt stated she will  on Monday. We did not have the Ozempic 1 MG sample.   Per Dr. Abhi Acosta

## 2023-12-04 RX ORDER — SEMAGLUTIDE 0.68 MG/ML
0.5 INJECTION, SOLUTION SUBCUTANEOUS
Qty: 3 ML | Refills: 0 | Status: SHIPPED | COMMUNITY
Start: 2023-12-04

## 2023-12-04 NOTE — PROGRESS NOTES
Sample ozemic 0.5 mg pen provided to patient by PCP  This is documenting the sample provided, entered order per policy  Per CPA with Dr. Jose Juan Fernandez. Gwen Alonzo [place]

## 2024-01-12 ENCOUNTER — OFFICE VISIT (OUTPATIENT)
Age: 69
End: 2024-01-12

## 2024-01-12 DIAGNOSIS — E78.2 MIXED HYPERLIPIDEMIA: ICD-10-CM

## 2024-01-12 DIAGNOSIS — E11.9 TYPE 2 DIABETES MELLITUS WITHOUT COMPLICATION, WITHOUT LONG-TERM CURRENT USE OF INSULIN (HCC): Primary | ICD-10-CM

## 2024-01-12 DIAGNOSIS — Z79.899 ENCOUNTER FOR LONG-TERM (CURRENT) USE OF MEDICATIONS: ICD-10-CM

## 2024-01-12 LAB
ALBUMIN SERPL-MCNC: 3.9 G/DL (ref 3.5–5)
ALBUMIN/GLOB SERPL: 1.4 (ref 1.1–2.2)
ALP SERPL-CCNC: 71 U/L (ref 45–117)
ALT SERPL-CCNC: 18 U/L (ref 12–78)
ANION GAP SERPL CALC-SCNC: 3 MMOL/L (ref 5–15)
APPEARANCE UR: CLEAR
AST SERPL-CCNC: 9 U/L (ref 15–37)
BACTERIA URNS QL MICRO: NEGATIVE /HPF
BILIRUB SERPL-MCNC: 0.5 MG/DL (ref 0.2–1)
BILIRUB UR QL: NEGATIVE
BUN SERPL-MCNC: 14 MG/DL (ref 6–20)
BUN/CREAT SERPL: 18 (ref 12–20)
CALCIUM SERPL-MCNC: 9.3 MG/DL (ref 8.5–10.1)
CHLORIDE SERPL-SCNC: 104 MMOL/L (ref 97–108)
CHOLEST SERPL-MCNC: 154 MG/DL
CO2 SERPL-SCNC: 34 MMOL/L (ref 21–32)
COLOR UR: ABNORMAL
CREAT SERPL-MCNC: 0.79 MG/DL (ref 0.55–1.02)
EPITH CASTS URNS QL MICRO: ABNORMAL /LPF
ERYTHROCYTE [DISTWIDTH] IN BLOOD BY AUTOMATED COUNT: 13.5 % (ref 11.5–14.5)
EST. AVERAGE GLUCOSE BLD GHB EST-MCNC: 108 MG/DL
GLOBULIN SER CALC-MCNC: 2.8 G/DL (ref 2–4)
GLUCOSE SERPL-MCNC: 96 MG/DL (ref 65–100)
GLUCOSE UR STRIP.AUTO-MCNC: NEGATIVE MG/DL
HBA1C MFR BLD: 5.4 % (ref 4–5.6)
HCT VFR BLD AUTO: 44.5 % (ref 35–47)
HDLC SERPL-MCNC: 44 MG/DL
HDLC SERPL: 3.5 (ref 0–5)
HGB BLD-MCNC: 14.1 G/DL (ref 11.5–16)
HGB UR QL STRIP: ABNORMAL
KETONES UR QL STRIP.AUTO: NEGATIVE MG/DL
LDLC SERPL CALC-MCNC: 93.8 MG/DL (ref 0–100)
LEUKOCYTE ESTERASE UR QL STRIP.AUTO: ABNORMAL
MCH RBC QN AUTO: 29.1 PG (ref 26–34)
MCHC RBC AUTO-ENTMCNC: 31.7 G/DL (ref 30–36.5)
MCV RBC AUTO: 91.9 FL (ref 80–99)
NITRITE UR QL STRIP.AUTO: NEGATIVE
NRBC # BLD: 0 K/UL (ref 0–0.01)
NRBC BLD-RTO: 0 PER 100 WBC
PH UR STRIP: 6.5 (ref 5–8)
PLATELET # BLD AUTO: 331 K/UL (ref 150–400)
PMV BLD AUTO: 10.4 FL (ref 8.9–12.9)
POTASSIUM SERPL-SCNC: 4.2 MMOL/L (ref 3.5–5.1)
PROT SERPL-MCNC: 6.7 G/DL (ref 6.4–8.2)
PROT UR STRIP-MCNC: NEGATIVE MG/DL
RBC # BLD AUTO: 4.84 M/UL (ref 3.8–5.2)
RBC #/AREA URNS HPF: ABNORMAL /HPF (ref 0–5)
SODIUM SERPL-SCNC: 141 MMOL/L (ref 136–145)
SP GR UR REFRACTOMETRY: 1.02 (ref 1–1.03)
TRIGL SERPL-MCNC: 81 MG/DL
UROBILINOGEN UR QL STRIP.AUTO: 1 EU/DL (ref 0.2–1)
VLDLC SERPL CALC-MCNC: 16.2 MG/DL
WBC # BLD AUTO: 10.1 K/UL (ref 3.6–11)
WBC URNS QL MICRO: ABNORMAL /HPF (ref 0–4)
YEAST URNS QL MICRO: PRESENT

## 2024-01-15 ENCOUNTER — OFFICE VISIT (OUTPATIENT)
Age: 69
End: 2024-01-15
Payer: MEDICARE

## 2024-01-15 VITALS
TEMPERATURE: 98.9 F | RESPIRATION RATE: 18 BRPM | DIASTOLIC BLOOD PRESSURE: 44 MMHG | HEIGHT: 66 IN | BODY MASS INDEX: 40.63 KG/M2 | SYSTOLIC BLOOD PRESSURE: 112 MMHG | HEART RATE: 70 BPM | WEIGHT: 252.8 LBS | OXYGEN SATURATION: 97 %

## 2024-01-15 DIAGNOSIS — F33.0 MILD RECURRENT MAJOR DEPRESSION (HCC): ICD-10-CM

## 2024-01-15 DIAGNOSIS — I10 ESSENTIAL (PRIMARY) HYPERTENSION: ICD-10-CM

## 2024-01-15 DIAGNOSIS — E66.9 NON MORBID OBESITY: ICD-10-CM

## 2024-01-15 DIAGNOSIS — E11.9 TYPE 2 DIABETES MELLITUS WITHOUT COMPLICATION, WITHOUT LONG-TERM CURRENT USE OF INSULIN (HCC): ICD-10-CM

## 2024-01-15 DIAGNOSIS — F51.01 PRIMARY INSOMNIA: ICD-10-CM

## 2024-01-15 DIAGNOSIS — K21.9 GASTROESOPHAGEAL REFLUX DISEASE, UNSPECIFIED WHETHER ESOPHAGITIS PRESENT: ICD-10-CM

## 2024-01-15 DIAGNOSIS — M12.812 ROTATOR CUFF ARTHROPATHY OF LEFT SHOULDER: ICD-10-CM

## 2024-01-15 DIAGNOSIS — Z79.899 ENCOUNTER FOR LONG-TERM (CURRENT) USE OF MEDICATIONS: ICD-10-CM

## 2024-01-15 DIAGNOSIS — Z00.00 MEDICARE ANNUAL WELLNESS VISIT, SUBSEQUENT: Primary | ICD-10-CM

## 2024-01-15 DIAGNOSIS — E78.2 MIXED HYPERLIPIDEMIA: ICD-10-CM

## 2024-01-15 PROCEDURE — 3078F DIAST BP <80 MM HG: CPT | Performed by: FAMILY MEDICINE

## 2024-01-15 PROCEDURE — 3074F SYST BP LT 130 MM HG: CPT | Performed by: FAMILY MEDICINE

## 2024-01-15 PROCEDURE — 3017F COLORECTAL CA SCREEN DOC REV: CPT | Performed by: FAMILY MEDICINE

## 2024-01-15 PROCEDURE — 99214 OFFICE O/P EST MOD 30 MIN: CPT | Performed by: FAMILY MEDICINE

## 2024-01-15 PROCEDURE — G8484 FLU IMMUNIZE NO ADMIN: HCPCS | Performed by: FAMILY MEDICINE

## 2024-01-15 PROCEDURE — 1036F TOBACCO NON-USER: CPT | Performed by: FAMILY MEDICINE

## 2024-01-15 PROCEDURE — G0439 PPPS, SUBSEQ VISIT: HCPCS | Performed by: FAMILY MEDICINE

## 2024-01-15 PROCEDURE — 1123F ACP DISCUSS/DSCN MKR DOCD: CPT | Performed by: FAMILY MEDICINE

## 2024-01-15 PROCEDURE — 3044F HG A1C LEVEL LT 7.0%: CPT | Performed by: FAMILY MEDICINE

## 2024-01-15 PROCEDURE — G8399 PT W/DXA RESULTS DOCUMENT: HCPCS | Performed by: FAMILY MEDICINE

## 2024-01-15 PROCEDURE — 2022F DILAT RTA XM EVC RTNOPTHY: CPT | Performed by: FAMILY MEDICINE

## 2024-01-15 PROCEDURE — 1090F PRES/ABSN URINE INCON ASSESS: CPT | Performed by: FAMILY MEDICINE

## 2024-01-15 PROCEDURE — G8417 CALC BMI ABV UP PARAM F/U: HCPCS | Performed by: FAMILY MEDICINE

## 2024-01-15 PROCEDURE — G8427 DOCREV CUR MEDS BY ELIG CLIN: HCPCS | Performed by: FAMILY MEDICINE

## 2024-01-15 PROCEDURE — 91320 SARSCV2 VAC 30MCG TRS-SUC IM: CPT | Performed by: FAMILY MEDICINE

## 2024-01-15 PROCEDURE — PBSHW COVID-19, PFIZER, (AGE 12Y+), IM, PF, 30MCG/0.3ML: Performed by: FAMILY MEDICINE

## 2024-01-15 RX ORDER — CALCIUM CARBONATE 500(1250)
500 TABLET ORAL DAILY
COMMUNITY

## 2024-01-15 RX ORDER — ALPRAZOLAM 0.5 MG/1
TABLET ORAL
Qty: 30 TABLET | Refills: 0 | Status: SHIPPED | OUTPATIENT
Start: 2024-01-15 | End: 2024-02-15

## 2024-01-15 ASSESSMENT — ENCOUNTER SYMPTOMS
CHEST TIGHTNESS: 0
CONSTIPATION: 0
RHINORRHEA: 0
COUGH: 0
BLOOD IN STOOL: 0
ABDOMINAL PAIN: 0
SHORTNESS OF BREATH: 0

## 2024-01-15 ASSESSMENT — PATIENT HEALTH QUESTIONNAIRE - PHQ9
5. POOR APPETITE OR OVEREATING: 0
SUM OF ALL RESPONSES TO PHQ9 QUESTIONS 1 & 2: 1
SUM OF ALL RESPONSES TO PHQ QUESTIONS 1-9: 5
8. MOVING OR SPEAKING SO SLOWLY THAT OTHER PEOPLE COULD HAVE NOTICED. OR THE OPPOSITE, BEING SO FIGETY OR RESTLESS THAT YOU HAVE BEEN MOVING AROUND A LOT MORE THAN USUAL: 0
7. TROUBLE CONCENTRATING ON THINGS, SUCH AS READING THE NEWSPAPER OR WATCHING TELEVISION: 1
SUM OF ALL RESPONSES TO PHQ QUESTIONS 1-9: 5
6. FEELING BAD ABOUT YOURSELF - OR THAT YOU ARE A FAILURE OR HAVE LET YOURSELF OR YOUR FAMILY DOWN: 1
3. TROUBLE FALLING OR STAYING ASLEEP: 1
SUM OF ALL RESPONSES TO PHQ QUESTIONS 1-9: 5
SUM OF ALL RESPONSES TO PHQ QUESTIONS 1-9: 5
9. THOUGHTS THAT YOU WOULD BE BETTER OFF DEAD, OR OF HURTING YOURSELF: 0
1. LITTLE INTEREST OR PLEASURE IN DOING THINGS: 0
4. FEELING TIRED OR HAVING LITTLE ENERGY: 1
2. FEELING DOWN, DEPRESSED OR HOPELESS: 1

## 2024-01-15 ASSESSMENT — LIFESTYLE VARIABLES
HOW MANY STANDARD DRINKS CONTAINING ALCOHOL DO YOU HAVE ON A TYPICAL DAY: PATIENT DOES NOT DRINK
HOW OFTEN DO YOU HAVE A DRINK CONTAINING ALCOHOL: MONTHLY OR LESS

## 2024-01-15 NOTE — PROGRESS NOTES
Subjective:      Patient ID: Lizbet Sanchez is a 68 y.o. female.    HPI  Follow up on chronic medical problems.  Reviewed labs done form this past Friday.    C/o left shoulder pain for the past few weeks.  Sx comes and goes.  Radiates to the upper arm.  Increase pain with ROM.  No injury noted.  Has only been taking occassional aleve for the pain which does help.  Increase pain at night when laying on the left side.       Cardiovascular Review:  The patient has hypertension and hyperlipidemia.     Diet and Lifestyle: generally follows a low fat low cholesterol diet, generally follows a low sodium diet, follows a diabetic diet regularly, exercises sporadically  Home BP Monitoring: is not measured at home.  Pertinent ROS: taking medications as instructed, no medication side effects noted, no TIA's, no chest pain on exertion, no dyspnea on exertion, slight swelling of ankles.   Follow up on diabetes and starting on Ozempic. .     DM type II follow up:  Tolerating well ozempic but she has had hard time.  Weight however has been stable.  We discussed diet changes to help with her weight.  Compliant w/ diabetic diet but has not been getting in exercise.  Has not been checking BS recently.  Denies any tingling sensation, polyuria and polydipsia.  No blurred vision.  No significant weight changes.  Feeling well since last OV.  Depression Review:  Patient is seen for followup of depression. Treatment includes Celexa.  Has been feeling more isolated which has made her feel more depressed intermittently.  Discussed increase exercise and increase social interaction with friends, family and social activities with groups or [employer].   Compliant with taking celexa.  Declined referral for therapy.  Ongoing symptoms include fatigue and intermittent anxiety and depressed mood.  No SI/HI.  Has some trouble with sleeping and uses the xanax as needed.    She denies hopelessness.   She experiences the following side effects from the

## 2024-01-15 NOTE — PROGRESS NOTES
Medicare Annual Wellness Visit    Lizbet Sanchez is here for Annual Exam and Pain (Pt presents with pain to L side and shoulder.)    Assessment & Plan   Medicare annual wellness visit, subsequent  Essential (primary) hypertension  Type 2 diabetes mellitus without complication, without long-term current use of insulin (HCC)  -     Microalbumin / Creatinine Urine Ratio; Future  Mixed hyperlipidemia  Mild recurrent major depression (HCC)  Primary insomnia  -     ALPRAZolam (XANAX) 0.5 MG tablet; TAKE ONE TABLET BY MOUTH AT NIGHTLY AS NEEDED, Disp-30 tablet, R-0Normal  Rotator cuff arthropathy of left shoulder  Gastroesophageal reflux disease, unspecified whether esophagitis present  Encounter for long-term (current) use of medications  Non morbid obesity    Recommendations for Preventive Services Due: see orders and patient instructions/AVS.  Recommended screening schedule for the next 5-10 years is provided to the patient in written form: see Patient Instructions/AVS.     Return in about 5 months (around 6/15/2024).     Subjective   Patient's complete Health Risk Assessment and screening values have been reviewed and are found in Flowsheets. The following problems were reviewed today and where indicated follow up appointments were made and/or referrals ordered.    Positive Risk Factor Screenings with Interventions:    Fall Risk:  Do you feel unsteady or are you worried about falling? : no  2 or more falls in past year?: no  Fall with injury in past year?: (!) yes     Interventions:    Reviewed medications, home hazards, visual acuity, and co-morbidities that can increase risk for falls  See AVS for additional education material     Depression:  PHQ-2 Score: 1  PHQ-9 Total Score: 5    Interpretation:  5-9 mild   10-14 moderate   15-19 moderately severe   20-27 severe     Interventions:  See AVS for additional education material          General HRA Questions:  Select all that apply: (!) New or Increased Pain    Pain

## 2024-01-15 NOTE — PROGRESS NOTES
Chief Complaint   Patient presents with    Annual Exam    Pain     Pt presents with pain to L side and shoulder.       \"Have you been to the ER, urgent care clinic since your last visit?  Hospitalized since your last visit?\"    YES, [facility] for UTI    “Have you seen or consulted any other health care providers outside of [facility] since your last visit?”    NO             Vitals:    01/15/24 1213   BP: (!) 112/44   Pulse: 70   Resp: 18   Temp: 98.9 °F (37.2 °C)   SpO2: 97%       Health Maintenance Due   Topic Date Due    Respiratory Syncytial Virus (RSV) Pregnant or age 60 yrs+ (1 - 1-dose 60+ series) Never done    Flu vaccine (1) 2023    COVID-19 Vaccine (2023- season) 2023    Diabetic Alb to Cr ratio (uACR) test  2023    Annual Wellness Visit (Medicare)  Never done        The patient, Lizbet Sanchez, identity was verified by name and .

## 2024-01-16 LAB
CREAT UR-MCNC: 227 MG/DL
MICROALBUMIN UR-MCNC: 1.05 MG/DL
MICROALBUMIN/CREAT UR-RTO: 5 MG/G (ref 0–30)

## 2024-01-17 ENCOUNTER — TELEPHONE (OUTPATIENT)
Age: 69
End: 2024-01-17

## 2024-01-17 NOTE — TELEPHONE ENCOUNTER
Per Marisa at the lab, it was re done on the 1/15/24.  When collected on 1/12/24 it was not enough.  Results are fine now.

## 2024-01-17 NOTE — TELEPHONE ENCOUNTER
----- Message from Yoselin Thomas sent at 1/15/2024  8:31 AM EST -----  Regarding: LAB SPECIMEN PROBLEM  Contact: [phone]  Please see information below for details regarding a [facility]    Patient Name: Lizbet Sanchez    Patient : 1955    Tests Affected:  Microalbumin/creat ratio urine    Description of Problem: insufficient sample collected(QNS)    Please place a new order and Client Services will contact the patient for recollection.    If you have any questions please call [phone] and a representative will assist you.  Thank you,  [facility] Client Services  ---------------------------------------------------------------------------------------------------------      Can the patient come in anytime to do her Microalbumin/creat ratio urine again?

## 2024-01-29 ENCOUNTER — PATIENT MESSAGE (OUTPATIENT)
Age: 69
End: 2024-01-29

## 2024-01-30 RX ORDER — TIRZEPATIDE 2.5 MG/.5ML
2.5 INJECTION, SOLUTION SUBCUTANEOUS WEEKLY
Qty: 2 ML | Refills: 5 | Status: SHIPPED | OUTPATIENT
Start: 2024-01-30

## 2024-01-30 NOTE — TELEPHONE ENCOUNTER
----- Message from Niesha Carrillo MA sent at 1/30/2024  5:00 PM EST -----  Regarding: FW: Ozempic  Contact: [phone]    ----- Message -----  From: Lizbet Sanchez  Sent: 1/30/2024   4:47 PM EST  To: [facility] Clinical Staff  Subject: Ozempic                                          Good afternoon Dr. NG and Niesha,  I spoke w/the staff at [facility] and they said my insurance will NOT cover Ozempic for me but they will cover one of the following:  Mounjaro- 2.5mg/0.5  Trulicity  - 0.75/0.5  Victorza-   18mg/3ml    Please let me know if either one of these will work for me.  I tried to call the office but someone picked up and then hung up.

## 2024-02-06 ENCOUNTER — TELEPHONE (OUTPATIENT)
Age: 69
End: 2024-02-06

## 2024-02-06 NOTE — TELEPHONE ENCOUNTER
----- Message from Niesha Carrillo MA sent at 2/5/2024 11:49 AM EST -----  Regarding: FW: Ozempic  Contact: [phone]    ----- Message -----  From: Lizbet Sanchez  Sent: 2/5/2024  10:34 AM EST  To: [facility] Clinical Staff  Subject: Ozempic                                          Good morning, I am very concerned about the confusion we are having with my medicine.  I have never had to call my insurance  provider regarding any type of prescription.  The pharmacist at [facility] said she sent you a form that you could complete filing an appeal for my case.  I'm aware that the insurance provider knows that I am a borderline diabetic.  To call and lie to them does not set well with me.  I thought Ozempic was also for heart management as well?  I am been quite anxious since all of this confusion has started.  What will happen to me once the Ozempic is no longer in my body?

## 2024-02-21 RX ORDER — CITALOPRAM 20 MG/1
20 TABLET ORAL DAILY
Qty: 90 TABLET | Refills: 1 | Status: SHIPPED | OUTPATIENT
Start: 2024-02-21

## 2024-02-21 NOTE — TELEPHONE ENCOUNTER
Last appointment: 1/15/24  Next appointment: 6/17/24  Previous refill encounter(s): 11/16/23 #90    Requested Prescriptions     Pending Prescriptions Disp Refills    citalopram (CELEXA) 20 MG tablet [Pharmacy Med Name: CITALOPRAM HBR 20 MG TABLET] 90 tablet 1     Sig: TAKE 1 TABLET BY MOUTH DAILY         For Pharmacy Admin Tracking Only    Program: Medication Refill  CPA in place:    Recommendation Provided To:   Intervention Detail: New Rx: 1, reason: Patient Preference  Intervention Accepted By:   Gap Closed?:    Time Spent (min): 5

## 2024-02-28 ENCOUNTER — CLINICAL DOCUMENTATION (OUTPATIENT)
Age: 69
End: 2024-02-28

## 2024-02-28 NOTE — PROGRESS NOTES
Per PA for pravastatin 20 mg is being reviewed, the plan has a limit of 30 tablets every 30 days. Ordered 180 tablets for 90 days.Has the patient taken the quantity allowed and it was ineffective? ( I looked in the chart and saw that she has only taken 2 - 20 mg tablets or try 40 mg tablet)

## 2024-03-06 RX ORDER — PRAVASTATIN SODIUM 40 MG
40 TABLET ORAL NIGHTLY
Qty: 90 TABLET | Refills: 3 | Status: SHIPPED | OUTPATIENT
Start: 2024-03-06

## 2024-03-06 NOTE — TELEPHONE ENCOUNTER
----- Message from Marilee Linn MD sent at 2/28/2024  2:18 PM EST -----  Regarding: RE: Under review  Load rx for pravastatin 40 mg qhs # 90 3 rf  ----- Message -----  From: Milton Knowles LPN  Sent: 2/28/2024   2:06 PM EST  To: Marilee Linn MD  Subject: Under review                                        Milton Knowles LPN  Licensed Nurse     Progress Notes     Signed     Encounter Date: 2/28/2024    Signed       Per PA for pravastatin 20 mg is being reviewed, the plan has a limit of 30 tablets every 30 days. Ordered 180 tablets for 90 days.Has the patient taken the quantity allowed and it was ineffective? ( I looked in the chart and saw that she has only taken 2 - 20 mg tablets or try 40 mg tablet)        Electronically signed by Milton Knowles LPN at 2/28/2024  1:31 PM         Clinical Documentation on 2/28/2024            Detailed Report          Note shared with patient  Additional Documentation    Encounter Info: Billing Info,     History,     Allergies,     Detailed Report    Progress Notes Info    Author Note Status Last Update User Last Update Date/Time  Milton Knowles LPN Signed Milton Knowles LPN 2/28/2024  1:31 PM    Chart Review Routing History    No routing history on file.  Orders Placed    None  Medication Changes      None  Medication List  Visit Diagnoses      None  Problem List

## 2024-03-18 RX ORDER — LEVOCETIRIZINE DIHYDROCHLORIDE 5 MG/1
5 TABLET, FILM COATED ORAL DAILY
Qty: 90 TABLET | Refills: 1 | Status: SHIPPED | OUTPATIENT
Start: 2024-03-18

## 2024-05-17 RX ORDER — LOSARTAN POTASSIUM AND HYDROCHLOROTHIAZIDE 12.5; 5 MG/1; MG/1
TABLET ORAL
Qty: 90 TABLET | Refills: 3 | Status: SHIPPED | OUTPATIENT
Start: 2024-05-17

## 2024-05-17 NOTE — TELEPHONE ENCOUNTER
Last appointment: 1/15/24  Next appointment: 6/17/24  Previous refill encounter(s): 4/17/23    Requested Prescriptions     Pending Prescriptions Disp Refills    losartan-hydroCHLOROthiazide (HYZAAR) 50-12.5 MG per tablet [Pharmacy Med Name: LOSARTAN-HCTZ 50-12.5 MG TAB] 90 tablet 3     Sig: TAKE ONE TABLET BY MOUTH DAILY         For Pharmacy Admin Tracking Only    Program: Medication Refill  CPA in place:    Recommendation Provided To:   Intervention Detail: New Rx: 1, reason: Patient Preference  Intervention Accepted By:   Gap Closed?:    Time Spent (min): 5

## 2024-06-17 ENCOUNTER — OFFICE VISIT (OUTPATIENT)
Age: 69
End: 2024-06-17
Payer: MEDICARE

## 2024-06-17 VITALS
HEART RATE: 63 BPM | WEIGHT: 252.4 LBS | OXYGEN SATURATION: 96 % | SYSTOLIC BLOOD PRESSURE: 122 MMHG | BODY MASS INDEX: 40.56 KG/M2 | RESPIRATION RATE: 18 BRPM | TEMPERATURE: 98.4 F | DIASTOLIC BLOOD PRESSURE: 59 MMHG | HEIGHT: 66 IN

## 2024-06-17 DIAGNOSIS — E66.9 NON MORBID OBESITY: ICD-10-CM

## 2024-06-17 DIAGNOSIS — I10 ESSENTIAL (PRIMARY) HYPERTENSION: Primary | ICD-10-CM

## 2024-06-17 DIAGNOSIS — Z12.31 ENCOUNTER FOR SCREENING MAMMOGRAM FOR BREAST CANCER: ICD-10-CM

## 2024-06-17 DIAGNOSIS — F51.01 PRIMARY INSOMNIA: ICD-10-CM

## 2024-06-17 DIAGNOSIS — Z86.010 HX OF COLONIC POLYPS: ICD-10-CM

## 2024-06-17 DIAGNOSIS — Z80.3 FAMILY HISTORY OF BREAST CANCER: ICD-10-CM

## 2024-06-17 DIAGNOSIS — E78.2 MIXED HYPERLIPIDEMIA: ICD-10-CM

## 2024-06-17 DIAGNOSIS — K21.9 GASTROESOPHAGEAL REFLUX DISEASE, UNSPECIFIED WHETHER ESOPHAGITIS PRESENT: ICD-10-CM

## 2024-06-17 DIAGNOSIS — E11.9 TYPE 2 DIABETES MELLITUS WITHOUT COMPLICATION, WITHOUT LONG-TERM CURRENT USE OF INSULIN (HCC): ICD-10-CM

## 2024-06-17 DIAGNOSIS — Z79.899 ENCOUNTER FOR LONG-TERM (CURRENT) USE OF MEDICATIONS: ICD-10-CM

## 2024-06-17 DIAGNOSIS — F33.0 MILD RECURRENT MAJOR DEPRESSION (HCC): ICD-10-CM

## 2024-06-17 DIAGNOSIS — J30.89 ENVIRONMENTAL AND SEASONAL ALLERGIES: ICD-10-CM

## 2024-06-17 DIAGNOSIS — Z12.11 COLON CANCER SCREENING: ICD-10-CM

## 2024-06-17 LAB
ALBUMIN SERPL-MCNC: 3.8 G/DL (ref 3.5–5)
ALBUMIN/GLOB SERPL: 1.4 (ref 1.1–2.2)
ALP SERPL-CCNC: 68 U/L (ref 45–117)
ALT SERPL-CCNC: 18 U/L (ref 12–78)
ANION GAP SERPL CALC-SCNC: 1 MMOL/L (ref 5–15)
AST SERPL-CCNC: 15 U/L (ref 15–37)
BILIRUB SERPL-MCNC: 0.4 MG/DL (ref 0.2–1)
BUN SERPL-MCNC: 16 MG/DL (ref 6–20)
BUN/CREAT SERPL: 20 (ref 12–20)
CALCIUM SERPL-MCNC: 9.5 MG/DL (ref 8.5–10.1)
CHLORIDE SERPL-SCNC: 104 MMOL/L (ref 97–108)
CHOLEST SERPL-MCNC: 181 MG/DL
CO2 SERPL-SCNC: 33 MMOL/L (ref 21–32)
CREAT SERPL-MCNC: 0.8 MG/DL (ref 0.55–1.02)
GLOBULIN SER CALC-MCNC: 2.7 G/DL (ref 2–4)
GLUCOSE SERPL-MCNC: 119 MG/DL (ref 65–100)
GLUCOSE, POC: 123 MG/DL
HBA1C MFR BLD: 5.9 %
HDLC SERPL-MCNC: 43 MG/DL
HDLC SERPL: 4.2 (ref 0–5)
LDLC SERPL CALC-MCNC: 118.6 MG/DL (ref 0–100)
POTASSIUM SERPL-SCNC: 4.4 MMOL/L (ref 3.5–5.1)
PROT SERPL-MCNC: 6.5 G/DL (ref 6.4–8.2)
SODIUM SERPL-SCNC: 138 MMOL/L (ref 136–145)
TRIGL SERPL-MCNC: 97 MG/DL
VLDLC SERPL CALC-MCNC: 19.4 MG/DL

## 2024-06-17 PROCEDURE — 99214 OFFICE O/P EST MOD 30 MIN: CPT | Performed by: FAMILY MEDICINE

## 2024-06-17 PROCEDURE — PBSHW AMB POC HEMOGLOBIN A1C: Performed by: FAMILY MEDICINE

## 2024-06-17 PROCEDURE — G8427 DOCREV CUR MEDS BY ELIG CLIN: HCPCS | Performed by: FAMILY MEDICINE

## 2024-06-17 PROCEDURE — 82962 GLUCOSE BLOOD TEST: CPT | Performed by: FAMILY MEDICINE

## 2024-06-17 PROCEDURE — 3074F SYST BP LT 130 MM HG: CPT | Performed by: FAMILY MEDICINE

## 2024-06-17 PROCEDURE — 3017F COLORECTAL CA SCREEN DOC REV: CPT | Performed by: FAMILY MEDICINE

## 2024-06-17 PROCEDURE — 83036 HEMOGLOBIN GLYCOSYLATED A1C: CPT | Performed by: FAMILY MEDICINE

## 2024-06-17 PROCEDURE — 2022F DILAT RTA XM EVC RTNOPTHY: CPT | Performed by: FAMILY MEDICINE

## 2024-06-17 PROCEDURE — 1090F PRES/ABSN URINE INCON ASSESS: CPT | Performed by: FAMILY MEDICINE

## 2024-06-17 PROCEDURE — 3044F HG A1C LEVEL LT 7.0%: CPT | Performed by: FAMILY MEDICINE

## 2024-06-17 PROCEDURE — G8417 CALC BMI ABV UP PARAM F/U: HCPCS | Performed by: FAMILY MEDICINE

## 2024-06-17 PROCEDURE — 1036F TOBACCO NON-USER: CPT | Performed by: FAMILY MEDICINE

## 2024-06-17 PROCEDURE — 3078F DIAST BP <80 MM HG: CPT | Performed by: FAMILY MEDICINE

## 2024-06-17 PROCEDURE — 1123F ACP DISCUSS/DSCN MKR DOCD: CPT | Performed by: FAMILY MEDICINE

## 2024-06-17 PROCEDURE — G8399 PT W/DXA RESULTS DOCUMENT: HCPCS | Performed by: FAMILY MEDICINE

## 2024-06-17 PROCEDURE — PBSHW AMB POC GLUCOSE BLOOD, BY GLUCOSE MONITORING DEVICE: Performed by: FAMILY MEDICINE

## 2024-06-17 RX ORDER — CETIRIZINE HYDROCHLORIDE 10 MG/1
10 TABLET ORAL DAILY
Qty: 30 TABLET | Refills: 3 | Status: SHIPPED | OUTPATIENT
Start: 2024-06-17

## 2024-06-17 RX ORDER — ALPRAZOLAM 0.5 MG/1
0.5 TABLET ORAL NIGHTLY PRN
Qty: 30 TABLET | Refills: 1 | Status: SHIPPED | OUTPATIENT
Start: 2024-06-17 | End: 2024-09-24

## 2024-06-17 RX ORDER — SEMAGLUTIDE 1.34 MG/ML
0.5 INJECTION, SOLUTION SUBCUTANEOUS
Qty: 3 ML | Refills: 3 | Status: SHIPPED | OUTPATIENT
Start: 2024-06-17 | End: 2024-06-17 | Stop reason: DRUGHIGH

## 2024-06-17 RX ORDER — BLOOD-GLUCOSE METER
1 KIT MISCELLANEOUS DAILY
Qty: 1 KIT | Refills: 0 | Status: SHIPPED | OUTPATIENT
Start: 2024-06-17

## 2024-06-17 RX ORDER — ALBUTEROL SULFATE 90 UG/1
2 AEROSOL, METERED RESPIRATORY (INHALATION) EVERY 4 HOURS PRN
Qty: 18 G | Refills: 3 | Status: SHIPPED | OUTPATIENT
Start: 2024-06-17

## 2024-06-17 RX ORDER — FLUTICASONE PROPIONATE 50 MCG
2 SPRAY, SUSPENSION (ML) NASAL DAILY PRN
Qty: 16 G | Refills: 3 | Status: SHIPPED | OUTPATIENT
Start: 2024-06-17

## 2024-06-17 RX ORDER — LANCETS 30 GAUGE
1 EACH MISCELLANEOUS 2 TIMES DAILY
Qty: 100 EACH | Refills: 5 | Status: SHIPPED | OUTPATIENT
Start: 2024-06-17

## 2024-06-17 RX ORDER — SEMAGLUTIDE 1.34 MG/ML
1 INJECTION, SOLUTION SUBCUTANEOUS
Qty: 3 ML | Refills: 3 | Status: SHIPPED | OUTPATIENT
Start: 2024-06-17 | End: 2024-06-17 | Stop reason: DRUGHIGH

## 2024-06-17 RX ORDER — ALPRAZOLAM 0.5 MG/1
0.5 TABLET ORAL NIGHTLY PRN
COMMUNITY
End: 2024-06-17 | Stop reason: SDUPTHER

## 2024-06-17 RX ORDER — GLUCOSAMINE HCL/CHONDROITIN SU 500-400 MG
CAPSULE ORAL
Qty: 100 STRIP | Refills: 0 | Status: SHIPPED | OUTPATIENT
Start: 2024-06-17

## 2024-06-17 ASSESSMENT — PATIENT HEALTH QUESTIONNAIRE - PHQ9
SUM OF ALL RESPONSES TO PHQ QUESTIONS 1-9: 3
2. FEELING DOWN, DEPRESSED OR HOPELESS: SEVERAL DAYS
7. TROUBLE CONCENTRATING ON THINGS, SUCH AS READING THE NEWSPAPER OR WATCHING TELEVISION: NOT AT ALL
5. POOR APPETITE OR OVEREATING: NOT AT ALL
SUM OF ALL RESPONSES TO PHQ QUESTIONS 1-9: 3
4. FEELING TIRED OR HAVING LITTLE ENERGY: SEVERAL DAYS
SUM OF ALL RESPONSES TO PHQ QUESTIONS 1-9: 3
SUM OF ALL RESPONSES TO PHQ QUESTIONS 1-9: 3
9. THOUGHTS THAT YOU WOULD BE BETTER OFF DEAD, OR OF HURTING YOURSELF: NOT AT ALL
1. LITTLE INTEREST OR PLEASURE IN DOING THINGS: NOT AT ALL
8. MOVING OR SPEAKING SO SLOWLY THAT OTHER PEOPLE COULD HAVE NOTICED. OR THE OPPOSITE, BEING SO FIGETY OR RESTLESS THAT YOU HAVE BEEN MOVING AROUND A LOT MORE THAN USUAL: NOT AT ALL
3. TROUBLE FALLING OR STAYING ASLEEP: SEVERAL DAYS
6. FEELING BAD ABOUT YOURSELF - OR THAT YOU ARE A FAILURE OR HAVE LET YOURSELF OR YOUR FAMILY DOWN: NOT AT ALL
SUM OF ALL RESPONSES TO PHQ9 QUESTIONS 1 & 2: 1

## 2024-06-17 ASSESSMENT — ENCOUNTER SYMPTOMS
SHORTNESS OF BREATH: 0
CONSTIPATION: 0
COUGH: 0
ABDOMINAL PAIN: 0
BLOOD IN STOOL: 0
RHINORRHEA: 1
CHEST TIGHTNESS: 0

## 2024-06-17 NOTE — PROGRESS NOTES
Chief Complaint   Patient presents with    Follow-up     Patient is  here today for a 5 month follow-up.       \"Have you been to the ER, urgent care clinic since your last visit?  Hospitalized since your last visit?\"    YES, [facility]    “Have you seen or consulted any other health care providers outside of [facility] since your last visit?”    NO       Vitals:    24 0824 24 0830   BP: (!) 140/74 (!) 122/59   Site: Left Upper Arm Right Upper Arm   Position: Sitting Sitting   Cuff Size: Large Adult Large Adult   Pulse: 63    Resp: 18    Temp: 98.4 °F (36.9 °C)    TempSrc: Oral    SpO2: 96%    Weight: 114.5 kg (252 lb 6.4 oz)    Height: 1.676 m (5' 6\")             There were no vitals filed for this visit.    Health Maintenance Due   Topic Date Due    Colorectal Cancer Screen  07/15/2024        The patient, Lizbet Sanchez, identity was verified by name and .   
release capsule Take 1 capsule by mouth daily 90 capsule 3    Lancets MISC Monitor BG daily  Dx: E11.9.  Provide with lancets insurance covers      acetaminophen (TYLENOL) 650 MG extended release tablet Take by mouth every 6 hours as needed      cyanocobalamin 1000 MCG tablet Take by mouth daily      melatonin 3 MG TABS tablet Take by mouth      psyllium (METAMUCIL SMOOTH TEXTURE) 58.6 % powder Take daily.      terbinafine (LAMISIL) 1 % cream Apply topically 2 times daily as needed      triamcinolone (KENALOG) 0.1 % cream APPLY TO AFFECTED AREA(S) TWO TIMES A DAY AS NEEDED FOR SKIN IRRITATION, USE A THIN LAYER TO       No current facility-administered medications for this visit.      Family History   Problem Relation Age of Onset    Hypertension Mother     Other Mother         obesity, chf    Heart Disease Mother         CHF    Kidney Disease Mother     High Cholesterol Mother     Cancer Father         lung    High Cholesterol Sister     Other Sister         obese    Hypertension Sister     Breast Cancer Sister 31    Mult Sclerosis Sister     High Cholesterol Sister     Hypertension Sister     Cancer Sister         breast    Hypertension Sister     Diabetes Sister     High Cholesterol Sister     Hypertension Brother     Liver Disease Brother         Hep C    High Cholesterol Brother     Cancer Brother         colon    Cancer Niece         cervical      Social History     Socioeconomic History    Marital status:    Tobacco Use    Smoking status: Never    Smokeless tobacco: Never   Vaping Use    Vaping Use: Never used   Substance and Sexual Activity    Alcohol use: Yes     Alcohol/week: 0.0 standard drinks of alcohol    Drug use: No    Sexual activity: Not Currently     Birth control/protection: Surgical     Social Determinants of Health     Financial Resource Strain: Low Risk  (7/17/2023)    Overall Financial Resource Strain (CARDIA)     Difficulty of Paying Living Expenses: Not hard at all   Transportation

## 2024-07-10 ENCOUNTER — TELEPHONE (OUTPATIENT)
Age: 69
End: 2024-07-10

## 2024-07-12 RX ORDER — OMEPRAZOLE 40 MG/1
40 CAPSULE, DELAYED RELEASE ORAL DAILY
Qty: 90 CAPSULE | Refills: 3 | Status: SHIPPED | OUTPATIENT
Start: 2024-07-12

## 2024-07-15 RX ORDER — LOSARTAN POTASSIUM AND HYDROCHLOROTHIAZIDE 12.5; 5 MG/1; MG/1
1 TABLET ORAL DAILY
Qty: 90 TABLET | Refills: 3 | OUTPATIENT
Start: 2024-07-15

## 2024-07-23 ENCOUNTER — HOSPITAL ENCOUNTER (OUTPATIENT)
Facility: HOSPITAL | Age: 69
Discharge: HOME | End: 2024-07-26
Attending: FAMILY MEDICINE
Payer: MEDICARE

## 2024-07-23 DIAGNOSIS — Z80.3 FAMILY HISTORY OF BREAST CANCER: ICD-10-CM

## 2024-07-23 PROCEDURE — C8908 MRI W/O FOL W/CONT, BREAST,: HCPCS

## 2024-07-23 PROCEDURE — 6360000004 HC RX CONTRAST MEDICATION: Performed by: FAMILY MEDICINE

## 2024-07-23 PROCEDURE — A9585 GADOBUTROL INJECTION: HCPCS | Performed by: FAMILY MEDICINE

## 2024-07-23 RX ORDER — GADOBUTROL 604.72 MG/ML
15 INJECTION INTRAVENOUS
Status: COMPLETED | OUTPATIENT
Start: 2024-07-23 | End: 2024-07-23

## 2024-07-23 RX ADMIN — GADOBUTROL 15 ML: 604.72 INJECTION INTRAVENOUS at 11:52

## 2024-08-02 ENCOUNTER — ANESTHESIA EVENT (OUTPATIENT)
Facility: HOSPITAL | Age: 69
End: 2024-08-02
Payer: MEDICARE

## 2024-08-02 ENCOUNTER — HOSPITAL ENCOUNTER (OUTPATIENT)
Facility: HOSPITAL | Age: 69
Setting detail: OUTPATIENT SURGERY
Discharge: HOME | End: 2024-08-02
Attending: INTERNAL MEDICINE | Admitting: INTERNAL MEDICINE
Payer: MEDICARE

## 2024-08-02 ENCOUNTER — ANESTHESIA (OUTPATIENT)
Facility: HOSPITAL | Age: 69
End: 2024-08-02
Payer: MEDICARE

## 2024-08-02 VITALS
BODY MASS INDEX: 39.86 KG/M2 | WEIGHT: 248 LBS | DIASTOLIC BLOOD PRESSURE: 53 MMHG | HEIGHT: 66 IN | RESPIRATION RATE: 18 BRPM | TEMPERATURE: 97.6 F | SYSTOLIC BLOOD PRESSURE: 123 MMHG | HEART RATE: 69 BPM | OXYGEN SATURATION: 97 %

## 2024-08-02 PROCEDURE — 7100000010 HC PHASE II RECOVERY - FIRST 15 MIN: Performed by: INTERNAL MEDICINE

## 2024-08-02 PROCEDURE — 3700000000 HC ANESTHESIA ATTENDED CARE: Performed by: INTERNAL MEDICINE

## 2024-08-02 PROCEDURE — 6360000002 HC RX W HCPCS

## 2024-08-02 PROCEDURE — 3600007502: Performed by: INTERNAL MEDICINE

## 2024-08-02 PROCEDURE — 7100000011 HC PHASE II RECOVERY - ADDTL 15 MIN: Performed by: INTERNAL MEDICINE

## 2024-08-02 PROCEDURE — 3700000001 HC ADD 15 MINUTES (ANESTHESIA): Performed by: INTERNAL MEDICINE

## 2024-08-02 PROCEDURE — 3600007512: Performed by: INTERNAL MEDICINE

## 2024-08-02 PROCEDURE — 2500000003 HC RX 250 WO HCPCS

## 2024-08-02 PROCEDURE — 2580000003 HC RX 258

## 2024-08-02 PROCEDURE — 2709999900 HC NON-CHARGEABLE SUPPLY: Performed by: INTERNAL MEDICINE

## 2024-08-02 RX ORDER — 0.9 % SODIUM CHLORIDE 0.9 %
INTRAVENOUS SOLUTION INTRAVENOUS PRN
Status: DISCONTINUED | OUTPATIENT
Start: 2024-08-02 | End: 2024-08-02 | Stop reason: SDUPTHER

## 2024-08-02 RX ORDER — SODIUM CHLORIDE 0.9 % (FLUSH) 0.9 %
5-40 SYRINGE (ML) INJECTION PRN
Status: DISCONTINUED | OUTPATIENT
Start: 2024-08-02 | End: 2024-08-02 | Stop reason: HOSPADM

## 2024-08-02 RX ORDER — SODIUM CHLORIDE 0.9 % (FLUSH) 0.9 %
5-40 SYRINGE (ML) INJECTION EVERY 12 HOURS SCHEDULED
Status: DISCONTINUED | OUTPATIENT
Start: 2024-08-02 | End: 2024-08-02 | Stop reason: HOSPADM

## 2024-08-02 RX ORDER — SODIUM CHLORIDE 9 MG/ML
25 INJECTION, SOLUTION INTRAVENOUS PRN
Status: DISCONTINUED | OUTPATIENT
Start: 2024-08-02 | End: 2024-08-02 | Stop reason: HOSPADM

## 2024-08-02 RX ORDER — LIDOCAINE HYDROCHLORIDE 20 MG/ML
INJECTION, SOLUTION EPIDURAL; INFILTRATION; INTRACAUDAL; PERINEURAL PRN
Status: DISCONTINUED | OUTPATIENT
Start: 2024-08-02 | End: 2024-08-02 | Stop reason: SDUPTHER

## 2024-08-02 RX ADMIN — PROPOFOL 200 MG: 10 INJECTION, EMULSION INTRAVENOUS at 12:52

## 2024-08-02 RX ADMIN — SODIUM CHLORIDE 300 ML: 900 INJECTION, SOLUTION INTRAVENOUS at 12:52

## 2024-08-02 RX ADMIN — LIDOCAINE HYDROCHLORIDE 50 MG: 20 INJECTION, SOLUTION EPIDURAL; INFILTRATION; INTRACAUDAL; PERINEURAL at 12:43

## 2024-08-02 ASSESSMENT — PAIN - FUNCTIONAL ASSESSMENT: PAIN_FUNCTIONAL_ASSESSMENT: 0-10

## 2024-08-02 NOTE — PROGRESS NOTES
Endoscopy Case End Note:    `252:  Procedure scope was pre-cleaned, per protocol, at bedside by HE Benites RN.      1255:  Report received from anesthesia - L .  See anesthesia flowsheet for intra-procedure vital signs and events.    1255:  Glasses returned to patient.

## 2024-08-02 NOTE — ANESTHESIA PRE PROCEDURE
Department of Anesthesiology  Preprocedure Note       Name:  Lizbet Sanchez   Age:  69 y.o.  :  1955                                          MRN:  [id number]         Date:  2024      Surgeon: Surgeon(s):  Ecsobar Benavidez MD    Procedure: Procedure(s):  COLONOSCOPY DIAGNOSTIC    Medications prior to admission:   Prior to Admission medications    Medication Sig Start Date End Date Taking? Authorizing Provider   omeprazole (PRILOSEC) 40 MG delayed release capsule TAKE 1 CAPSULE BY MOUTH DAILY 24   Marilee Linn MD   albuterol sulfate HFA (PROVENTIL;VENTOLIN;PROAIR) 108 (90 Base) MCG/ACT inhaler Inhale 2 puffs into the lungs every 4 hours as needed for Wheezing or Shortness of Breath 24   Marilee Linn MD   fluticasone (FLONASE) 50 MCG/ACT nasal spray 2 sprays by Nasal route daily as needed for Rhinitis or Allergies 24   Mariele Linn MD   ALPRAZolam (XANAX) 0.5 MG tablet Take 1 tablet by mouth nightly as needed for Sleep for up to 99 days. Max Daily Amount: 0.5 mg 24  Marilee Linn MD   blood glucose monitor strips Test 2 times a day & as needed for symptoms of irregular blood glucose. Dispense sufficient amount for indicated testing frequency plus additional to accommodate PRN testing needs. 24   Marilee Linn MD   glucose monitoring kit 1 kit by Does not apply route daily 24   Marilee Linn MD   Lancets MISC 1 each by Does not apply route 2 times daily 24   Marilee Linn MD   cetirizine (ZYRTEC) 10 MG tablet Take 1 tablet by mouth daily 24   Marilee Linn MD   losartan-hydroCHLOROthiazide (HYZAAR) 50-12.5 MG per tablet TAKE ONE TABLET BY MOUTH DAILY 24   Marilee Linn MD   levocetirizine (XYZAL) 5 MG tablet TAKE ONE TABLET BY MOUTH DAILY 3/18/24   Marilee Linn MD   pravastatin (PRAVACHOL) 40 MG tablet Take 1 tablet by mouth at bedtime

## 2024-08-02 NOTE — DISCHARGE INSTRUCTIONS
[place] Office: [phone]    Lizbet Sanchez  [id number]  1955    EGD/COLONOSCOPY DISCHARGE INSTRUCTIONS  Discomfort:  Sore throat- throat lozenges or warm salt water gargle  redness at IV site- apply warm compress to area; if redness or soreness persist- contact your physician  Gaseous discomfort- walking, belching will help relieve any discomfort  You may not operate a vehicle for 12 hours  You may not engage in an occupation involving machinery or appliances for rest of today.  You may not drink alcoholic beverages for at least 12 hours  Avoid making any critical decisions for at least 24 hour  DIET  You may resume your regular diet - however -  remember your colon is empty and a heavy meal will produce gas.   Avoid these foods:  fried / greasy foods, excessive carbonated drinks or too much caffeine  MEDICATIONS   Regarding Aspirin or Nonsteroidal medications specifically, please see below.  ACTIVITY  You may resume your normal daily activities.   Spend the remainder of the day resting -  avoid any strenuous activity.    CALL M.D.  ANY SIGN OF   Increasing pain, nausea, vomiting  Abdominal distension (swelling)  New increased bleeding (oral or rectal)  Fever (chills)  Pain in chest area  Bloody discharge from nose or mouth  Shortness of breath    You may louise any Advil, Aspirin, Ibuprofen, Motrin or   Tylenol as needed for pain.    Findings:  The Olympus video high-definition colonoscope was advanced to the cecum, identified by its typical land marks, with ease.  Colon is not well prepped with yellow mush-like stool/vegetable material mix covering the walls. This cannot be lavaged clean despite effort.  No large mass seen though but  viewing of mucosa is limited by the prep  Medium-sized internal hemorrhoids with moderate sigmoid diverticulosis noted.      Follow-up Instructions:   Call  Herbert Benavidez MD for any questions or concerns     Telephone # [phone]      Patient Education on Sedation /

## 2024-08-02 NOTE — H&P
Pre-endoscopy H and P    The patient was seen and examined in the room/pre-op holding area.  The airway was assessed and documented.  The problem list, past medical history, and medications were reviewed.     Patient Active Problem List   Diagnosis    Benign essential hematuria    Depression    Environmental allergies    Hypertensive retinopathy    Mixed hyperlipidemia    Hypovitaminosis D    At high risk for breast cancer    Obesity, morbid (HCC)    FH: colon cancer    Diet-controlled diabetes mellitus (HCC)    H/O: hysterectomy    Encounter for medication monitoring    Major depressive disorder, single episode, mild (HCC)     Social History     Socioeconomic History    Marital status:      Spouse name: Not on file    Number of children: Not on file    Years of education: Not on file    Highest education level: Not on file   Occupational History    Not on file   Tobacco Use    Smoking status: Never    Smokeless tobacco: Never   Vaping Use    Vaping Use: Never used   Substance and Sexual Activity    Alcohol use: Yes     Comment: occasional    Drug use: No    Sexual activity: Not Currently     Birth control/protection: Surgical   Other Topics Concern    Not on file   Social History Narrative    Not on file     Social Determinants of Health     Financial Resource Strain: Low Risk  (7/17/2023)    Overall Financial Resource Strain (CARDIA)     Difficulty of Paying Living Expenses: Not hard at all   Food Insecurity: Not on file (7/17/2023)   Transportation Needs: Unknown (7/17/2023)    PRAPARE - Transportation     Lack of Transportation (Medical): Not on file     Lack of Transportation (Non-Medical): No   Physical Activity: Inactive (1/15/2024)    Exercise Vital Sign     Days of Exercise per Week: 0 days     Minutes of Exercise per Session: 0 min   Stress: Not on file   Social Connections: Not on file   Intimate Partner Violence: Not on file   Housing Stability: Unknown (7/17/2023)    Housing Stability Vital Sign  as needed for Rhinitis or Allergies   furosemide (LASIX) 20 MG tablet 2024  No No   Sig: Take 1 tablet by mouth daily   glucose monitoring kit 2024  No No   Si kit by Does not apply route daily   levocetirizine (XYZAL) 5 MG tablet 2024  No No   Sig: TAKE ONE TABLET BY MOUTH DAILY   losartan-hydroCHLOROthiazide (HYZAAR) 50-12.5 MG per tablet 2024  No No   Sig: TAKE ONE TABLET BY MOUTH DAILY   melatonin 3 MG TABS tablet 2024  Yes No   Sig: Take by mouth   nystatin (MYCOSTATIN) 353303 UNIT/GM powder Past Week  No No   Sig: Apply topically 2 times daily as needed (RASH)   omeprazole (PRILOSEC) 40 MG delayed release capsule 2024  No No   Sig: TAKE 1 CAPSULE BY MOUTH DAILY   pravastatin (PRAVACHOL) 40 MG tablet 2024  No No   Sig: Take 1 tablet by mouth at bedtime   psyllium (METAMUCIL SMOOTH TEXTURE) 58.6 % powder 2024  Yes No   Sig: Take daily.   terbinafine (LAMISIL) 1 % cream 2024  Yes No   Sig: Apply topically 2 times daily as needed   triamcinolone (KENALOG) 0.1 % cream 2024  Yes No   Sig: APPLY TO AFFECTED AREA(S) TWO TIMES A DAY AS NEEDED FOR SKIN IRRITATION, USE A THIN LAYER TO      Facility-Administered Medications: None           The review of systems is:  Negative  for shortness of breath or chest pain      The heart, lungs, and mental status were satisfactory for the administration of deep sedation and for the procedure.      I discussed with the patient the objectives, risks, consequences and alternatives to the procedure.      Escobar Benavidez MD  2024  12:29 PM

## 2024-08-02 NOTE — PROGRESS NOTES
ARRIVAL INFORMATION:  Verified patient name and date of birth, scheduled procedure, and informed consent.     : [place] (friend) contact number: [phone]  Physician and staff can share information with the .     Belongings with patient include:  Clothing,Glasses    GI FOCUSED ASSESSMENT:  Neuro: Awake, alert, oriented x4  Respiratory: even and unlabored   GI: soft and non-distended  EKG Rhythm: normal sinus rhythm    Education:Reviewed general discharge instructions and  information.

## 2024-08-02 NOTE — OP NOTE
Colonoscopy Procedure Note    Lizbet Sanchez  1955  [id number]    Indications:  Please see below.    Pre-operative Diagnosis: Family hx of colon cancer. Personal hx of colon polyps    Post-operative Diagnosis: Internal hemorrhoids, Diveriticulosis      : Herbert Benavidez MD    Referring Provider: Marilee Linn MD    Sedation:  MAC anesthesia Propofol        Procedure Details:    After detailed informed consent was obtained with all risks and benefits of procedure explained and preoperative exam completed, the patient was taken to the endoscopy suite and placed in the left lateral decubitus position.  Upon sequential sedation as per above, a digital rectal exam was performed  and was normal.  The Olympus videocolonoscope  was inserted in the rectum and carefully advanced to the cecum, which was identified by the ileocecal valve and appendiceal orifice.   The colonoscope was slowly withdrawn with careful evaluation between folds.   Retroflexion in the rectum was performed.      The quality of preparation was poor    Benzie Bowel Preparation Score: 1/1/2      Findings:   The Olympus video high-definition colonoscope was advanced to the cecum, identified by its typical land marks, with ease.  Colon is not well prepped with yellow mush-like stool/vegetable material mix covering the walls. This cannot be lavaged clean despite effort.  No large mass seen though but  viewing of mucosa is limited by the prep  Medium-sized internal hemorrhoids with moderate sigmoid diverticulosis noted.      Therapies:  none    Specimen/s:  none     Complications: None were encountered during the procedure.     EBL:  None.    Recommendations:     -Repeat colonoscopy w/ a better prep.        S. Herbert Benavidez MD  8/2/2024  12:55 PM

## 2024-08-02 NOTE — ANESTHESIA POSTPROCEDURE EVALUATION
Department of Anesthesiology  Postprocedure Note    Patient: Lizbet Sanchez  MRN: [id number]  YOB: 1955  Date of evaluation: 8/2/2024    Procedure Summary       Date: 08/02/24 Room / Location: [facility] ENDO 01 / [facility] ENDOSCOPY    Anesthesia Start: 1243 Anesthesia Stop: 1255    Procedure: COLONOSCOPY DIAGNOSTIC (Lower GI Region) Diagnosis:       Screening for colon cancer      (Screening for colon cancer [Z12.11])    Surgeons: Escobar Benavidez MD Responsible Provider: Kevin Nair MD    Anesthesia Type: TIVA ASA Status: 3            Anesthesia Type: No value filed.    Martín Phase I: Martín Score: 10    Martín Phase II: Martín Score: 9    Anesthesia Post Evaluation    Patient location during evaluation: PACU  Patient participation: complete - patient participated  Level of consciousness: awake  Airway patency: patent  Nausea & Vomiting: no vomiting  Cardiovascular status: hemodynamically stable  Respiratory status: acceptable  Hydration status: euvolemic    No notable events documented.

## 2024-08-27 RX ORDER — CITALOPRAM HYDROBROMIDE 20 MG/1
20 TABLET ORAL DAILY
Qty: 90 TABLET | Refills: 1 | Status: SHIPPED | OUTPATIENT
Start: 2024-08-27

## 2024-08-27 NOTE — TELEPHONE ENCOUNTER
Last appointment: 6/17/24  Next appointment: 11/18/24  Previous refill encounter(s): 2/21/24 #90 with 1 refill    Requested Prescriptions     Pending Prescriptions Disp Refills    citalopram (CELEXA) 20 MG tablet [Pharmacy Med Name: CITALOPRAM HBR 20 MG TABLET] 90 tablet 1     Sig: TAKE 1 TABLET BY MOUTH DAILY         For Pharmacy Admin Tracking Only    Program: Medication Refill  CPA in place:    Recommendation Provided To:   Intervention Detail: New Rx: 1, reason: Patient Preference  Intervention Accepted By:   Gap Closed?:    Time Spent (min): 5

## 2024-10-03 NOTE — PROGRESS NOTES
This is a Subsequent Medicare Annual Wellness Exam (AWV) (Performed 12 months after IPPE or effective date of Medicare Part B enrollment)    I have reviewed the patient's medical history in detail and updated the computerized patient record. History     Patient Active Problem List   Diagnosis Code    Environmental allergies Z91.09    Depression F32.9    C section     H/O: hysterectomy Z90.710    Hypovitaminosis D E55.9    Mixed hyperlipidemia E78.2    Benign essential hematuria R31.9    Diet-controlled diabetes mellitus ([facility] [address] E11.9    Hypertensive retinopathy H35.039    Encounter for medication monitoring Z51.81    At high risk for breast cancer Z91.89    Obesity, morbid ([facility] [address] E66.01    FH: colon cancer Z80.0       Current Outpatient Medications   Medication Sig Dispense Refill    fluticasone propionate (FLONASE) 50 mcg/actuation nasal spray 2 Sprays by Both Nostrils route daily as needed for Allergies. 3 Bottle 3    ketoconazole (NIZORAL) 2 % topical cream APPLY TO AFFECTED AREA(S) TWO TIMES A DAY AS NEEDED FOR SKIN IRRITATION OR ITCHING 15 g 1    gabapentin (NEURONTIN) 300 mg capsule Take 1 Cap by mouth two (2) times a day. Max Daily Amount: 600 mg. 60 Cap 3    omeprazole (PRILOSEC) 40 mg capsule TAKE ONE CAPSULE BY MOUTH DAILY 90 Cap 3    losartan-hydroCHLOROthiazide (HYZAAR) 50-12.5 mg per tablet TAKE ONE TABLET BY MOUTH DAILY 90 Tab 2    pravastatin (PRAVACHOL) 20 mg tablet Take 2 Tabs by mouth nightly. 180 Tab 3    levocetirizine (XYZAL) 5 mg tablet TAKE ONE TABLET BY MOUTH DAILY 30 Tab 10    loratadine (CLARITIN) 10 mg tablet Take 10 mg by mouth daily.       topiramate (TOPAMAX) 50 mg tablet TAKE ONE TABLET BY MOUTH TWICE A DAY 60 Tab 10    citalopram (CELEXA) 20 mg tablet TAKE ONE TABLET BY MOUTH DAILY 90 Tab 3    furosemide (LASIX) 20 mg tablet TAKE ONE TABLET BY MOUTH DAILY 90 Tab 2    diclofenac EC (VOLTAREN) 75 mg EC tablet Take 1 Tab by mouth two (2) times daily as needed for Pain (shoulder pain). Take with food. 30 Tab 1    cholecalciferol (VITAMIN D3) 1,000 unit tablet Take 1,000 Units by mouth every seven (7) days.  cyanocobalamin (VITAMIN B-12) 1,000 mcg tablet Take 1,000 mcg by mouth daily.  ALPRAZolam (XANAX) 0.5 mg tablet TAKE ONE TABLET BY MOUTH AT NIGHTLY AS NEEDED 90 Tab 0    albuterol (PROAIR HFA) 90 mcg/actuation inhaler Take 2 Puffs by inhalation every four (4) hours as needed for Wheezing or Shortness of Breath. Please instruct Pt on how to use an inhaler. 1 Inhaler 3    melatonin 3 mg tablet Take 3 mg by mouth nightly as needed.  acetaminophen (TYLENOL ARTHRITIS PAIN) 650 mg CR tablet Take 1,300 mg by mouth every six (6) hours as needed for Pain.  MULTIVITAMIN PO Take 1 Tab by mouth daily.  aspirin 81 mg chewable tablet Take 81 mg by mouth daily.  CALCIUM CARB/VIT D3/MINERALS (CALTRATE PLUS PO) Take 1 Tab by mouth every seven (7) days. Allergies   Allergen Reactions    Ciprofloxacin Other (comments)     Insomnia and hot flashes    Demerol [Meperidine] Other (comments)     hallucinations    Doxycycline Unknown (comments)       \"Feeling flushed\"    Floxin [Ofloxacin] Other (comments)     insomnia    Lisinopril Cough    Phenergan-Codeine [Promethazine-Codeine] Itching     Patient stated she started itching when she was tapering off of medication.     Prednisone Other (comments)     Has feelings of depression, \"weird thoughts\"       Past Medical History:   Diagnosis Date    C section 3/12/2010    Depression 3/12/2010    Diabetes ([facility] [address]     Environmental allergies 3/12/2010    H/O: hysterectomy 3/12/2010    High cholesterol 3/12/2010    HTN (hypertension) 3/12/2010    Pneumonia 04/2017       Past Surgical History:   Procedure Laterality Date    ENDOSCOPY, COLON, DIAGNOSTIC  2006    repeat 10 yrs (Dr. Oswaldo Lincoln)    HX BREAST BIOPSY  01/01/1980    both breast    HX BREAST BIOPSY  01/01/1986    left breast    HX  SECTION      x 3    HX CORNEAL TRANSPLANT  2011    left eye-     HX CYSTOSTOMY  3/2015    HX HYSTERECTOMY  1998       Family History   Problem Relation Age of Onset    Hypertension Mother     Other Mother         obesity, chf    Heart Disease Mother         CHF    Kidney Disease Mother     High Cholesterol Mother     Cancer Father         lung    Breast Cancer Sister     MS Sister     High Cholesterol Sister     Hypertension Sister     Cancer Sister         breast    Hypertension Brother     Liver Disease Brother         Hep C    High Cholesterol Brother     Cancer Brother         colon    Hypertension Sister     Diabetes Sister     High Cholesterol Sister     Hypertension Sister     Other Sister         obese    High Cholesterol Sister     Cancer Niece         cervical       Social History     Tobacco Use    Smoking status: Never Smoker    Smokeless tobacco: Never Used   Substance Use Topics    Alcohol use: Yes     Alcohol/week: 0.0 standard drinks     Comment: occasional         Depression Risk Factor Screening:     PHQ over the last two weeks 2017   Little interest or pleasure in doing things Not at all   Feeling down, depressed or hopeless Not at all   Total Score PHQ 2 0     Alcohol Risk Factor Screening: You do not drink alcohol or very rarely. Functional Ability and Level of Safety:   Hearing Loss  Hearing is good. Activities of Daily Living  The home contains: no safety equipment. Patient does total self care    Fall RiskFall Risk Assessment, last 12 mths 2017   Able to walk? Yes   Fall in past 12 months? No     Functional Ability:   Does the patient exhibit a steady gait? yes    How long did it take the patient to get up and walk from a sitting position? seconds    Is the patient self reliant? (ie can do own laundry, meals, household chores)  yes   Does the patient handle his/her own medications?   yes   Does the patient handle his/her own money?  yes   Is the patients home safe (ie good lighting, handrails on stairs and bath, etc.)? yes   Did you notice or did patient express any hearing difficulties? no   Did you notice or did patient express any vision difficulties? no        Advance Care Planning:   Patient was offered the opportunity to discuss advance care planning:  yes    Does patient have an Advance Directive:  no   If no, did you provide information on Caring Connections? yes      Abuse Screen  Patient is not abused    Cognitive Screening   Evaluation of Cognitive Function:  Has your family/caregiver stated any concerns about your memory: no      Patient Care Team   Patient Care Team:  Cindy Martin MD as PCP - General    Assessment/Plan   Education and counseling provided:  Are appropriate based on today's review and evaluation  End-of-Life planning (with patient's consent)    ASSESSMENT and PLAN    Medicare Annual Wellness  Continue current treatment plan. Continue annual follow up. I have discussed diagnosis listed in this note with pt and/or family. I have discussed treatment plans and options and the risk/benefit analysis of those options, including safe use of medications and possible medication side effects. Through the use of shared decision making we have agreed to the above plan. The patient has received an after-visit summary and questions were answered concerning future plans and follow up. Advise pt of any urgent changes then to proceed to the ER. Patient unable to complete

## 2024-10-18 ENCOUNTER — HOSPITAL ENCOUNTER (OUTPATIENT)
Facility: HOSPITAL | Age: 69
Discharge: HOME | End: 2024-10-21
Attending: FAMILY MEDICINE
Payer: MEDICARE

## 2024-10-18 DIAGNOSIS — Z12.31 ENCOUNTER FOR SCREENING MAMMOGRAM FOR BREAST CANCER: ICD-10-CM

## 2024-10-18 PROCEDURE — 77063 BREAST TOMOSYNTHESIS BI: CPT

## 2024-11-18 ENCOUNTER — OFFICE VISIT (OUTPATIENT)
Age: 69
End: 2024-11-18
Payer: MEDICARE

## 2024-11-18 VITALS
HEART RATE: 64 BPM | WEIGHT: 245 LBS | BODY MASS INDEX: 39.37 KG/M2 | OXYGEN SATURATION: 96 % | SYSTOLIC BLOOD PRESSURE: 139 MMHG | TEMPERATURE: 99 F | RESPIRATION RATE: 18 BRPM | DIASTOLIC BLOOD PRESSURE: 72 MMHG | HEIGHT: 66 IN

## 2024-11-18 DIAGNOSIS — E78.2 MIXED HYPERLIPIDEMIA: ICD-10-CM

## 2024-11-18 DIAGNOSIS — E11.9 TYPE 2 DIABETES MELLITUS WITHOUT COMPLICATION, WITHOUT LONG-TERM CURRENT USE OF INSULIN (HCC): ICD-10-CM

## 2024-11-18 DIAGNOSIS — Z79.899 ENCOUNTER FOR LONG-TERM (CURRENT) USE OF MEDICATIONS: ICD-10-CM

## 2024-11-18 DIAGNOSIS — K21.9 GASTROESOPHAGEAL REFLUX DISEASE, UNSPECIFIED WHETHER ESOPHAGITIS PRESENT: ICD-10-CM

## 2024-11-18 DIAGNOSIS — R42 VERTIGO: ICD-10-CM

## 2024-11-18 DIAGNOSIS — J30.89 ENVIRONMENTAL AND SEASONAL ALLERGIES: ICD-10-CM

## 2024-11-18 DIAGNOSIS — H91.92 DECREASED HEARING OF LEFT EAR: ICD-10-CM

## 2024-11-18 DIAGNOSIS — I10 ESSENTIAL (PRIMARY) HYPERTENSION: Primary | ICD-10-CM

## 2024-11-18 DIAGNOSIS — H61.23 IMPACTED CERUMEN OF BOTH EARS: ICD-10-CM

## 2024-11-18 DIAGNOSIS — F33.0 MILD RECURRENT MAJOR DEPRESSION (HCC): ICD-10-CM

## 2024-11-18 DIAGNOSIS — E66.9 NON MORBID OBESITY: ICD-10-CM

## 2024-11-18 LAB
ALBUMIN SERPL-MCNC: 3.6 G/DL (ref 3.5–5)
ALBUMIN/GLOB SERPL: 1.4 (ref 1.1–2.2)
ALP SERPL-CCNC: 64 U/L (ref 45–117)
ALT SERPL-CCNC: 16 U/L (ref 12–78)
ANION GAP SERPL CALC-SCNC: 4 MMOL/L (ref 2–12)
AST SERPL-CCNC: 9 U/L (ref 15–37)
BILIRUB SERPL-MCNC: 0.5 MG/DL (ref 0.2–1)
BUN SERPL-MCNC: 14 MG/DL (ref 6–20)
BUN/CREAT SERPL: 18 (ref 12–20)
CALCIUM SERPL-MCNC: 9.7 MG/DL (ref 8.5–10.1)
CHLORIDE SERPL-SCNC: 105 MMOL/L (ref 97–108)
CHOLEST SERPL-MCNC: 161 MG/DL
CO2 SERPL-SCNC: 33 MMOL/L (ref 21–32)
CREAT SERPL-MCNC: 0.78 MG/DL (ref 0.55–1.02)
ERYTHROCYTE [DISTWIDTH] IN BLOOD BY AUTOMATED COUNT: 13.6 % (ref 11.5–14.5)
GLOBULIN SER CALC-MCNC: 2.6 G/DL (ref 2–4)
GLUCOSE SERPL-MCNC: 104 MG/DL (ref 65–100)
GLUCOSE, POC: 126 MG/DL
HBA1C MFR BLD: 5.9 %
HCT VFR BLD AUTO: 42.7 % (ref 35–47)
HDLC SERPL-MCNC: 46 MG/DL
HDLC SERPL: 3.5 (ref 0–5)
HGB BLD-MCNC: 13.8 G/DL (ref 11.5–16)
LDLC SERPL CALC-MCNC: 96.4 MG/DL (ref 0–100)
MCH RBC QN AUTO: 29.6 PG (ref 26–34)
MCHC RBC AUTO-ENTMCNC: 32.3 G/DL (ref 30–36.5)
MCV RBC AUTO: 91.4 FL (ref 80–99)
NRBC # BLD: 0 K/UL (ref 0–0.01)
NRBC BLD-RTO: 0 PER 100 WBC
PLATELET # BLD AUTO: 299 K/UL (ref 150–400)
PMV BLD AUTO: 10.6 FL (ref 8.9–12.9)
POTASSIUM SERPL-SCNC: 4.5 MMOL/L (ref 3.5–5.1)
PROT SERPL-MCNC: 6.2 G/DL (ref 6.4–8.2)
RBC # BLD AUTO: 4.67 M/UL (ref 3.8–5.2)
SODIUM SERPL-SCNC: 142 MMOL/L (ref 136–145)
TRIGL SERPL-MCNC: 93 MG/DL
VLDLC SERPL CALC-MCNC: 18.6 MG/DL
WBC # BLD AUTO: 8.2 K/UL (ref 3.6–11)

## 2024-11-18 PROCEDURE — 83036 HEMOGLOBIN GLYCOSYLATED A1C: CPT | Performed by: FAMILY MEDICINE

## 2024-11-18 PROCEDURE — 99214 OFFICE O/P EST MOD 30 MIN: CPT | Performed by: FAMILY MEDICINE

## 2024-11-18 PROCEDURE — 82962 GLUCOSE BLOOD TEST: CPT | Performed by: FAMILY MEDICINE

## 2024-11-18 RX ORDER — AZELASTINE 1 MG/ML
2 SPRAY, METERED NASAL 2 TIMES DAILY PRN
Qty: 30 ML | Refills: 5 | Status: SHIPPED | OUTPATIENT
Start: 2024-11-18

## 2024-11-18 RX ORDER — FEXOFENADINE HCL 180 MG/1
180 TABLET ORAL DAILY PRN
Qty: 30 TABLET | Refills: 0
Start: 2024-11-18 | End: 2024-12-18

## 2024-11-18 SDOH — ECONOMIC STABILITY: FOOD INSECURITY: WITHIN THE PAST 12 MONTHS, YOU WORRIED THAT YOUR FOOD WOULD RUN OUT BEFORE YOU GOT MONEY TO BUY MORE.: NEVER TRUE

## 2024-11-18 SDOH — ECONOMIC STABILITY: FOOD INSECURITY: WITHIN THE PAST 12 MONTHS, THE FOOD YOU BOUGHT JUST DIDN'T LAST AND YOU DIDN'T HAVE MONEY TO GET MORE.: NEVER TRUE

## 2024-11-18 SDOH — ECONOMIC STABILITY: INCOME INSECURITY: HOW HARD IS IT FOR YOU TO PAY FOR THE VERY BASICS LIKE FOOD, HOUSING, MEDICAL CARE, AND HEATING?: NOT HARD AT ALL

## 2024-11-18 ASSESSMENT — PATIENT HEALTH QUESTIONNAIRE - PHQ9
3. TROUBLE FALLING OR STAYING ASLEEP: SEVERAL DAYS
SUM OF ALL RESPONSES TO PHQ9 QUESTIONS 1 & 2: 1
SUM OF ALL RESPONSES TO PHQ QUESTIONS 1-9: 5
SUM OF ALL RESPONSES TO PHQ QUESTIONS 1-9: 5
1. LITTLE INTEREST OR PLEASURE IN DOING THINGS: NOT AT ALL
4. FEELING TIRED OR HAVING LITTLE ENERGY: SEVERAL DAYS
SUM OF ALL RESPONSES TO PHQ QUESTIONS 1-9: 5
5. POOR APPETITE OR OVEREATING: NOT AT ALL
2. FEELING DOWN, DEPRESSED OR HOPELESS: SEVERAL DAYS
9. THOUGHTS THAT YOU WOULD BE BETTER OFF DEAD, OR OF HURTING YOURSELF: NOT AT ALL
8. MOVING OR SPEAKING SO SLOWLY THAT OTHER PEOPLE COULD HAVE NOTICED. OR THE OPPOSITE, BEING SO FIGETY OR RESTLESS THAT YOU HAVE BEEN MOVING AROUND A LOT MORE THAN USUAL: NOT AT ALL
SUM OF ALL RESPONSES TO PHQ QUESTIONS 1-9: 5
6. FEELING BAD ABOUT YOURSELF - OR THAT YOU ARE A FAILURE OR HAVE LET YOURSELF OR YOUR FAMILY DOWN: SEVERAL DAYS
7. TROUBLE CONCENTRATING ON THINGS, SUCH AS READING THE NEWSPAPER OR WATCHING TELEVISION: SEVERAL DAYS

## 2024-11-18 ASSESSMENT — ENCOUNTER SYMPTOMS
COUGH: 0
SHORTNESS OF BREATH: 0
BLOOD IN STOOL: 0
RHINORRHEA: 1
CHEST TIGHTNESS: 0
ABDOMINAL PAIN: 0
CONSTIPATION: 0

## 2024-11-18 NOTE — PROGRESS NOTES
Chief Complaint   Patient presents with    Follow-up     Patient is here today for a 5 month follow-up       \"Have you been to the ER, urgent care clinic since your last visit?  Hospitalized since your last visit?\"    NO    “Have you seen or consulted any other health care providers outside of [facility] since your last visit?”    NO            Click Here for Release of Records Request      Vitals:    24 0816   BP: 139/72   Pulse: 64   Resp: 18   Temp: 99 °F (37.2 °C)   SpO2: 96%       Health Maintenance Due   Topic Date Due    Flu vaccine (1) 2024    COVID-19 Vaccine ( season) 2024        The patient, Lizbet Sanchez, identity was verified by name and .   
TSH 2.450 06/04/2019       Review of Systems   Constitutional:  Negative for activity change.   HENT:  Positive for ear pain and rhinorrhea. Negative for congestion.    Respiratory:  Negative for cough, chest tightness and shortness of breath.    Cardiovascular:  Negative for chest pain, palpitations and leg swelling.   Gastrointestinal:  Negative for abdominal pain, blood in stool and constipation.   Endocrine: Negative for polyuria.   Genitourinary:  Negative for difficulty urinating, frequency, hematuria and urgency.   Musculoskeletal:  Negative for arthralgias, joint swelling and myalgias.   Skin:  Negative for rash.   Allergic/Immunologic: Positive for environmental allergies.   Neurological:  Negative for dizziness, light-headedness and headaches.   Psychiatric/Behavioral:  Negative for dysphoric mood and sleep disturbance. The patient is not nervous/anxious.        Objective:   Physical Exam  Constitutional:       Appearance: Normal appearance.      Comments: /72 (Site: Left Upper Arm, Position: Sitting, Cuff Size: Large Adult)   Pulse 64   Temp 99 °F (37.2 °C) (Oral)   Resp 18   Ht 1.676 m (5' 6\")   Wt 111.1 kg (245 lb)   SpO2 96%   BMI 39.54 kg/m²    HENT:      Right Ear: Tympanic membrane normal.      Left Ear: Tympanic membrane normal. There is impacted cerumen.      Nose: No rhinorrhea.      Mouth/Throat:      Mouth: Mucous membranes are moist.   Cardiovascular:      Rate and Rhythm: Normal rate and regular rhythm.   Pulmonary:      Effort: Pulmonary effort is normal.      Breath sounds: Normal breath sounds.   Abdominal:      General: Bowel sounds are normal.      Palpations: Abdomen is soft.      Tenderness: There is no abdominal tenderness.   Musculoskeletal:         General: Normal range of motion.      Cervical back: Normal range of motion and neck supple.      Right lower leg: No edema.      Left lower leg: No edema.   Lymphadenopathy:      Cervical: No cervical adenopathy.   Skin:

## 2025-02-07 DIAGNOSIS — I10 ESSENTIAL (PRIMARY) HYPERTENSION: ICD-10-CM

## 2025-02-10 NOTE — TELEPHONE ENCOUNTER
Last appointment: 11/18/24  Next appointment: 4/8/25  Previous refill encounter(s): 6/17/24 #30 with 1 refill    Requested Prescriptions     Pending Prescriptions Disp Refills    ALPRAZolam (XANAX) 0.5 MG tablet [Pharmacy Med Name: ALPRAZolam 0.5 MG TABLET] 30 tablet 0     Sig: Take 1 tablet by mouth nightly as needed for Sleep for up to 30 days. Max Daily Amount: 0.5 mg         For Pharmacy Admin Tracking Only    Program: Medication Refill  CPA in place:    Recommendation Provided To:   Intervention Detail: New Rx: 1, reason: Patient Preference  Intervention Accepted By:   Gap Closed?:    Time Spent (min): 5

## 2025-02-11 RX ORDER — ALPRAZOLAM 0.5 MG
0.5 TABLET ORAL NIGHTLY PRN
Qty: 30 TABLET | Refills: 1 | Status: SHIPPED | OUTPATIENT
Start: 2025-02-11 | End: 2026-02-06

## 2025-02-28 RX ORDER — CITALOPRAM HYDROBROMIDE 20 MG/1
20 TABLET ORAL DAILY
Qty: 90 TABLET | Refills: 1 | Status: SHIPPED | OUTPATIENT
Start: 2025-02-28

## 2025-02-28 NOTE — TELEPHONE ENCOUNTER
Last appointment: 11/18/24  Next appointment: 4/8/25  Previous refill encounter(s): 8/27/24 #90 with 1 refill    Requested Prescriptions     Pending Prescriptions Disp Refills    citalopram (CELEXA) 20 MG tablet [Pharmacy Med Name: CITALOPRAM HBR 20 MG TABLET] 90 tablet 1     Sig: TAKE 1 TABLET BY MOUTH DAILY         For Pharmacy Admin Tracking Only    Program: Medication Refill  CPA in place:    Recommendation Provided To:   Intervention Detail: New Rx: 1, reason: Patient Preference  Intervention Accepted By:   Gap Closed?:    Time Spent (min): 5

## 2025-04-07 ENCOUNTER — TELEPHONE (OUTPATIENT)
Age: 70
End: 2025-04-07

## 2025-04-07 SDOH — HEALTH STABILITY: PHYSICAL HEALTH: ON AVERAGE, HOW MANY MINUTES DO YOU ENGAGE IN EXERCISE AT THIS LEVEL?: 20 MIN

## 2025-04-07 SDOH — HEALTH STABILITY: PHYSICAL HEALTH: ON AVERAGE, HOW MANY DAYS PER WEEK DO YOU ENGAGE IN MODERATE TO STRENUOUS EXERCISE (LIKE A BRISK WALK)?: 1 DAY

## 2025-04-07 ASSESSMENT — LIFESTYLE VARIABLES
HOW OFTEN DO YOU HAVE A DRINK CONTAINING ALCOHOL: MONTHLY OR LESS
HOW MANY STANDARD DRINKS CONTAINING ALCOHOL DO YOU HAVE ON A TYPICAL DAY: 1
HOW OFTEN DO YOU HAVE A DRINK CONTAINING ALCOHOL: 2
HOW OFTEN DO YOU HAVE SIX OR MORE DRINKS ON ONE OCCASION: 1
HOW MANY STANDARD DRINKS CONTAINING ALCOHOL DO YOU HAVE ON A TYPICAL DAY: 1 OR 2

## 2025-04-07 ASSESSMENT — PATIENT HEALTH QUESTIONNAIRE - PHQ9
4. FEELING TIRED OR HAVING LITTLE ENERGY: SEVERAL DAYS
10. IF YOU CHECKED OFF ANY PROBLEMS, HOW DIFFICULT HAVE THESE PROBLEMS MADE IT FOR YOU TO DO YOUR WORK, TAKE CARE OF THINGS AT HOME, OR GET ALONG WITH OTHER PEOPLE: NOT DIFFICULT AT ALL
9. THOUGHTS THAT YOU WOULD BE BETTER OFF DEAD, OR OF HURTING YOURSELF: NOT AT ALL
1. LITTLE INTEREST OR PLEASURE IN DOING THINGS: SEVERAL DAYS
8. MOVING OR SPEAKING SO SLOWLY THAT OTHER PEOPLE COULD HAVE NOTICED. OR THE OPPOSITE, BEING SO FIGETY OR RESTLESS THAT YOU HAVE BEEN MOVING AROUND A LOT MORE THAN USUAL: NOT AT ALL
SUM OF ALL RESPONSES TO PHQ QUESTIONS 1-9: 7
SUM OF ALL RESPONSES TO PHQ QUESTIONS 1-9: 7
2. FEELING DOWN, DEPRESSED OR HOPELESS: SEVERAL DAYS
SUM OF ALL RESPONSES TO PHQ QUESTIONS 1-9: 7
7. TROUBLE CONCENTRATING ON THINGS, SUCH AS READING THE NEWSPAPER OR WATCHING TELEVISION: NOT AT ALL
SUM OF ALL RESPONSES TO PHQ QUESTIONS 1-9: 7
6. FEELING BAD ABOUT YOURSELF - OR THAT YOU ARE A FAILURE OR HAVE LET YOURSELF OR YOUR FAMILY DOWN: SEVERAL DAYS
3. TROUBLE FALLING OR STAYING ASLEEP: NEARLY EVERY DAY
5. POOR APPETITE OR OVEREATING: NOT AT ALL

## 2025-04-07 NOTE — TELEPHONE ENCOUNTER
Attempted to contact patient regarding upcoming Medicare wellness appointment and completion of HRA questionnaire. LVM for patient to please return call at  [phone].

## 2025-04-08 ENCOUNTER — OFFICE VISIT (OUTPATIENT)
Age: 70
End: 2025-04-08
Payer: MEDICARE

## 2025-04-08 VITALS
DIASTOLIC BLOOD PRESSURE: 70 MMHG | SYSTOLIC BLOOD PRESSURE: 137 MMHG | WEIGHT: 256.4 LBS | BODY MASS INDEX: 41.21 KG/M2 | OXYGEN SATURATION: 96 % | TEMPERATURE: 98.7 F | RESPIRATION RATE: 20 BRPM | HEIGHT: 66 IN | HEART RATE: 68 BPM

## 2025-04-08 DIAGNOSIS — Z00.00 MEDICARE ANNUAL WELLNESS VISIT, SUBSEQUENT: Primary | ICD-10-CM

## 2025-04-08 DIAGNOSIS — E78.2 MIXED HYPERLIPIDEMIA: ICD-10-CM

## 2025-04-08 DIAGNOSIS — J30.89 ENVIRONMENTAL AND SEASONAL ALLERGIES: ICD-10-CM

## 2025-04-08 DIAGNOSIS — I10 ESSENTIAL (PRIMARY) HYPERTENSION: ICD-10-CM

## 2025-04-08 DIAGNOSIS — K21.9 GASTROESOPHAGEAL REFLUX DISEASE, UNSPECIFIED WHETHER ESOPHAGITIS PRESENT: ICD-10-CM

## 2025-04-08 DIAGNOSIS — F33.0 MILD RECURRENT MAJOR DEPRESSION: ICD-10-CM

## 2025-04-08 DIAGNOSIS — Z12.39 ENCOUNTER FOR BREAST CANCER SCREENING OTHER THAN MAMMOGRAM: ICD-10-CM

## 2025-04-08 DIAGNOSIS — E66.9 NON MORBID OBESITY: ICD-10-CM

## 2025-04-08 DIAGNOSIS — E11.9 TYPE 2 DIABETES MELLITUS WITHOUT COMPLICATION, WITHOUT LONG-TERM CURRENT USE OF INSULIN: ICD-10-CM

## 2025-04-08 DIAGNOSIS — Z79.899 ENCOUNTER FOR LONG-TERM (CURRENT) USE OF MEDICATIONS: ICD-10-CM

## 2025-04-08 DIAGNOSIS — Z80.3 FAMILY HISTORY OF BREAST CANCER IN SISTER: ICD-10-CM

## 2025-04-08 LAB
ALBUMIN SERPL-MCNC: 4.1 G/DL (ref 3.5–5)
ALBUMIN/GLOB SERPL: 1.4 (ref 1.1–2.2)
ALP SERPL-CCNC: 75 U/L (ref 45–117)
ALT SERPL-CCNC: 20 U/L (ref 12–78)
ANION GAP SERPL CALC-SCNC: 3 MMOL/L (ref 2–12)
APPEARANCE UR: CLEAR
AST SERPL-CCNC: 16 U/L (ref 15–37)
BACTERIA URNS QL MICRO: ABNORMAL /HPF
BILIRUB SERPL-MCNC: 0.5 MG/DL (ref 0.2–1)
BILIRUB UR QL: NEGATIVE
BUN SERPL-MCNC: 15 MG/DL (ref 6–20)
BUN/CREAT SERPL: 18 (ref 12–20)
CALCIUM SERPL-MCNC: 9.6 MG/DL (ref 8.5–10.1)
CHLORIDE SERPL-SCNC: 103 MMOL/L (ref 97–108)
CHOLEST SERPL-MCNC: 195 MG/DL
CO2 SERPL-SCNC: 33 MMOL/L (ref 21–32)
COLOR UR: ABNORMAL
CREAT SERPL-MCNC: 0.83 MG/DL (ref 0.55–1.02)
CREAT UR-MCNC: 104 MG/DL
EPITH CASTS URNS QL MICRO: ABNORMAL /LPF
GLOBULIN SER CALC-MCNC: 2.9 G/DL (ref 2–4)
GLUCOSE SERPL-MCNC: 97 MG/DL (ref 65–100)
GLUCOSE UR STRIP.AUTO-MCNC: NEGATIVE MG/DL
GLUCOSE, POC: 128 MG/DL
HBA1C MFR BLD: 6 %
HDLC SERPL-MCNC: 49 MG/DL
HDLC SERPL: 4 (ref 0–5)
HGB UR QL STRIP: ABNORMAL
KETONES UR QL STRIP.AUTO: NEGATIVE MG/DL
LDLC SERPL CALC-MCNC: 117.4 MG/DL (ref 0–100)
LEUKOCYTE ESTERASE UR QL STRIP.AUTO: ABNORMAL
MICROALBUMIN UR-MCNC: 0.65 MG/DL
MICROALBUMIN/CREAT UR-RTO: 6 MG/G (ref 0–30)
NITRITE UR QL STRIP.AUTO: NEGATIVE
PH UR STRIP: 6.5 (ref 5–8)
POTASSIUM SERPL-SCNC: 4.1 MMOL/L (ref 3.5–5.1)
PROT SERPL-MCNC: 7 G/DL (ref 6.4–8.2)
PROT UR STRIP-MCNC: NEGATIVE MG/DL
RBC #/AREA URNS HPF: ABNORMAL /HPF (ref 0–5)
SODIUM SERPL-SCNC: 139 MMOL/L (ref 136–145)
SP GR UR REFRACTOMETRY: 1.02 (ref 1–1.03)
SPECIMEN HOLD: NORMAL
TRIGL SERPL-MCNC: 143 MG/DL
UROBILINOGEN UR QL STRIP.AUTO: 0.2 EU/DL (ref 0.2–1)
VLDLC SERPL CALC-MCNC: 28.6 MG/DL
WBC URNS QL MICRO: ABNORMAL /HPF (ref 0–4)
YEAST URNS QL MICRO: PRESENT

## 2025-04-08 PROCEDURE — 1126F AMNT PAIN NOTED NONE PRSNT: CPT | Performed by: FAMILY MEDICINE

## 2025-04-08 PROCEDURE — 3017F COLORECTAL CA SCREEN DOC REV: CPT | Performed by: FAMILY MEDICINE

## 2025-04-08 PROCEDURE — PBSHW AMB POC HEMOGLOBIN A1C: Performed by: FAMILY MEDICINE

## 2025-04-08 PROCEDURE — G0439 PPPS, SUBSEQ VISIT: HCPCS | Performed by: FAMILY MEDICINE

## 2025-04-08 PROCEDURE — 1036F TOBACCO NON-USER: CPT | Performed by: FAMILY MEDICINE

## 2025-04-08 PROCEDURE — G8417 CALC BMI ABV UP PARAM F/U: HCPCS | Performed by: FAMILY MEDICINE

## 2025-04-08 PROCEDURE — 3078F DIAST BP <80 MM HG: CPT | Performed by: FAMILY MEDICINE

## 2025-04-08 PROCEDURE — PBSHW AMB POC GLUCOSE BLOOD, BY GLUCOSE MONITORING DEVICE: Performed by: FAMILY MEDICINE

## 2025-04-08 PROCEDURE — G8399 PT W/DXA RESULTS DOCUMENT: HCPCS | Performed by: FAMILY MEDICINE

## 2025-04-08 PROCEDURE — G8427 DOCREV CUR MEDS BY ELIG CLIN: HCPCS | Performed by: FAMILY MEDICINE

## 2025-04-08 PROCEDURE — 83036 HEMOGLOBIN GLYCOSYLATED A1C: CPT | Performed by: FAMILY MEDICINE

## 2025-04-08 PROCEDURE — 2022F DILAT RTA XM EVC RTNOPTHY: CPT | Performed by: FAMILY MEDICINE

## 2025-04-08 PROCEDURE — 99214 OFFICE O/P EST MOD 30 MIN: CPT | Performed by: FAMILY MEDICINE

## 2025-04-08 PROCEDURE — 3075F SYST BP GE 130 - 139MM HG: CPT | Performed by: FAMILY MEDICINE

## 2025-04-08 PROCEDURE — 3044F HG A1C LEVEL LT 7.0%: CPT | Performed by: FAMILY MEDICINE

## 2025-04-08 PROCEDURE — 1159F MED LIST DOCD IN RCRD: CPT | Performed by: FAMILY MEDICINE

## 2025-04-08 PROCEDURE — 1160F RVW MEDS BY RX/DR IN RCRD: CPT | Performed by: FAMILY MEDICINE

## 2025-04-08 PROCEDURE — 1090F PRES/ABSN URINE INCON ASSESS: CPT | Performed by: FAMILY MEDICINE

## 2025-04-08 PROCEDURE — 82962 GLUCOSE BLOOD TEST: CPT | Performed by: FAMILY MEDICINE

## 2025-04-08 PROCEDURE — 3046F HEMOGLOBIN A1C LEVEL >9.0%: CPT | Performed by: FAMILY MEDICINE

## 2025-04-08 PROCEDURE — 1123F ACP DISCUSS/DSCN MKR DOCD: CPT | Performed by: FAMILY MEDICINE

## 2025-04-08 RX ORDER — TERCONAZOLE 8 MG/G
1 CREAM VAGINAL NIGHTLY
COMMUNITY
Start: 2025-02-13 | End: 2025-04-08 | Stop reason: ALTCHOICE

## 2025-04-08 RX ORDER — FLUTICASONE PROPIONATE 50 MCG
2 SPRAY, SUSPENSION (ML) NASAL DAILY PRN
Qty: 3 EACH | Refills: 3 | Status: SHIPPED | OUTPATIENT
Start: 2025-04-08

## 2025-04-08 SDOH — ECONOMIC STABILITY: FOOD INSECURITY: WITHIN THE PAST 12 MONTHS, THE FOOD YOU BOUGHT JUST DIDN'T LAST AND YOU DIDN'T HAVE MONEY TO GET MORE.: NEVER TRUE

## 2025-04-08 SDOH — ECONOMIC STABILITY: FOOD INSECURITY: WITHIN THE PAST 12 MONTHS, YOU WORRIED THAT YOUR FOOD WOULD RUN OUT BEFORE YOU GOT MONEY TO BUY MORE.: NEVER TRUE

## 2025-04-08 ASSESSMENT — ENCOUNTER SYMPTOMS
CHEST TIGHTNESS: 0
RHINORRHEA: 0
COUGH: 0
ABDOMINAL PAIN: 0
BLOOD IN STOOL: 0
SHORTNESS OF BREATH: 0
CONSTIPATION: 0

## 2025-04-08 NOTE — PROGRESS NOTES
Chief Complaint   Patient presents with    Medicare AWV     Patient is here today for AWV.       \"Have you been to the ER, urgent care clinic since your last visit?  Hospitalized since your last visit?\"    YES - When: approximately 2 months ago.  Where and Why: [facility].    “Have you seen or consulted any other health care providers outside of [facility] since your last visit?”    NO            Click Here for Release of Records Request       There were no vitals filed for this visit.   Health Maintenance Due   Topic Date Due    COVID-19 Vaccine ( season) 2024    Diabetic Alb to Cr ratio (uACR) test  01/15/2025    Annual Wellness Visit (Medicare)  01/15/2025        The patient, Lizbet Sanchez, identity was verified by name and .     
    Financial Resource Strain: Low Risk  (11/18/2024)    Overall Financial Resource Strain (CARDIA)     Difficulty of Paying Living Expenses: Not hard at all   Food Insecurity: No Food Insecurity (4/8/2025)    Hunger Vital Sign     Worried About Running Out of Food in the Last Year: Never true     Ran Out of Food in the Last Year: Never true   Transportation Needs: No Transportation Needs (4/8/2025)    PRAPARE - Transportation     Lack of Transportation (Medical): No     Lack of Transportation (Non-Medical): No   Physical Activity: Insufficiently Active (4/7/2025)    Exercise Vital Sign     Days of Exercise per Week: 1 day     Minutes of Exercise per Session: 20 min   Housing Stability: Low Risk  (4/8/2025)    Housing Stability Vital Sign     Unable to Pay for Housing in the Last Year: No     Number of Times Moved in the Last Year: 0     Homeless in the Last Year: No      Lab Results   Component Value Date    WBC 8.2 11/18/2024    HGB 13.8 11/18/2024    HCT 42.7 11/18/2024    MCV 91.4 11/18/2024     11/18/2024     Lab Results   Component Value Date     11/18/2024    K 4.5 11/18/2024     11/18/2024    CO2 33 (H) 11/18/2024    BUN 14 11/18/2024    CREATININE 0.78 11/18/2024    GLUCOSE 104 (H) 11/18/2024    CALCIUM 9.7 11/18/2024    BILITOT 0.5 11/18/2024    ALKPHOS 64 11/18/2024    AST 9 (L) 11/18/2024    ALT 16 11/18/2024    LABGLOM 82 11/18/2024    GFRAA >60 09/02/2022    AGRATIO 1.4 09/02/2022    GLOB 2.6 11/18/2024     Lab Results   Component Value Date    CHOL 161 11/18/2024    TRIG 93 11/18/2024    HDL 46 11/18/2024    VLDL 18.6 11/18/2024    CHOLHDLRATIO 3.5 11/18/2024     Hemoglobin A1C   Date Value Ref Range Status   01/12/2024 5.4 4.0 - 5.6 % Final     Comment:     (NOTE)  HbA1C Interpretive Ranges  <5.7              Normal  5.7 - 6.4         Consider Prediabetes  >6.5              Consider Diabetes       Hemoglobin A1C, POC   Date Value Ref Range Status   04/08/2025 6.0 % Final     Lab 
  calcium carbonate (OSCAL) 500 MG TABS tablet Take 1 tablet by mouth daily Yes Provider, MD Regulo   Elderberry 500 MG CAPS Take by mouth Yes Provider, MD Regulo   furosemide (LASIX) 20 MG tablet Take 1 tablet by mouth daily Yes Marilee Linn MD   nystatin (MYCOSTATIN) 039155 UNIT/GM powder Apply topically 2 times daily as needed (RASH) Yes Marilee Linn MD   Lancets MISC Monitor BG daily  Dx: E11.9.  Provide with lancets insurance covers Yes Automatic Reconciliation, Ar   acetaminophen (TYLENOL) 650 MG extended release tablet Take by mouth every 6 hours as needed Yes Automatic Reconciliation, Ar   cyanocobalamin 1000 MCG tablet Take by mouth daily Yes Automatic Reconciliation, Ar   psyllium (METAMUCIL SMOOTH TEXTURE) 58.6 % powder Take daily. Yes Automatic Reconciliation, Ar   terbinafine (LAMISIL) 1 % cream Apply topically 2 times daily as needed Yes Automatic Reconciliation, Ar       CareTeam (Including outside providers/suppliers regularly involved in providing care):   Patient Care Team:  Marilee Linn MD as PCP - General  Marilee Linn MD as PCP - Empaneled Provider     Recommendations for Preventive Services Due: see orders and patient instructions/AVS.  Recommended screening schedule for the next 5-10 years is provided to the patient in written form: see Patient Instructions/AVS.     Reviewed and updated this visit:  Tobacco  Allergies  Meds  Problems  Med Hx  Surg Hx  Fam Hx  Sexual   Hx

## 2025-04-08 NOTE — PATIENT INSTRUCTIONS
is very little chance of having a problem from this test. If dilating drops are used for a vision test, they may make the eyes sting and cause a medicine taste in the mouth.  Follow-up care is a key part of your treatment and safety. Be sure to make and go to all appointments, and call your doctor if you are having problems. It's also a good idea to know your test results and keep a list of the medicines you take.  Where can you learn more?  Go to https://www.[employer].net/patientEd and enter G551 to learn more about \"Learning About Vision Tests.\"  Current as of: July 31, 2024  Content Version: 14.4  © 1998-9952 [employer].   Care instructions adapted under license by [employer]. If you have questions about a medical condition or this instruction, always ask your healthcare professional. [employer], disclaims any warranty or liability for your use of this information.         Starting a Weight-Loss Plan: Care Instructions  Overview    It can be a challenge to lose weight. But your doctor can help you make a weight-loss plan that meets your needs.  You don't have to make a lot of big changes at once. A better idea might be to focus on small changes and stick with them. When those changes become habit, you can add a few more changes.  Some people find it helpful to take an exercise or nutrition class. If you have questions, ask your doctor about seeing a registered dietitian or an exercise specialist. You might also think about joining a weight-loss support group.  If you're not ready to make changes right now, try to pick a date in the future. Then make an appointment with your doctor to talk about when and how you'll get started with a plan.  Follow-up care is a key part of your treatment and safety. Be sure to make and go to all appointments, and call your doctor if you are having problems. It's also a good idea to know your test results and keep a list of the medicines you take.  How can

## 2025-04-09 ENCOUNTER — RESULTS FOLLOW-UP (OUTPATIENT)
Age: 70
End: 2025-04-09

## 2025-04-10 ENCOUNTER — TELEPHONE (OUTPATIENT)
Age: 70
End: 2025-04-10

## 2025-04-10 NOTE — TELEPHONE ENCOUNTER
Letter faxed today to [facility] to fax the pt most recent eye exam to our office.

## 2025-04-10 NOTE — TELEPHONE ENCOUNTER
----- Message from Dr. Marilee Linn MD sent at 4/8/2025 10:58 AM EDT -----  Send for eye exam.  [facility].

## 2025-04-23 ENCOUNTER — TELEPHONE (OUTPATIENT)
Age: 70
End: 2025-04-23

## 2025-04-23 NOTE — TELEPHONE ENCOUNTER
Patient states that she would like for  to write her another letter stating inability to serve on jury duty she would like to pick it up today or tomorrow her call back number is [phone]

## 2025-04-24 ENCOUNTER — TELEPHONE (OUTPATIENT)
Age: 70
End: 2025-04-24

## 2025-04-24 NOTE — TELEPHONE ENCOUNTER
Spoke to the pt on yesterday and today to let her know that when Dr. Paniagua gives me the letter I will give her a call her.

## 2025-04-24 NOTE — TELEPHONE ENCOUNTER
Patient was calling for status of Jury Letter,  needs to be at [place] by 4/28/25. Patient would like to be called  at [phone].

## 2025-05-08 RX ORDER — PRAVASTATIN SODIUM 40 MG
40 TABLET ORAL NIGHTLY
Qty: 90 TABLET | Refills: 3 | Status: SHIPPED | OUTPATIENT
Start: 2025-05-08

## 2025-05-08 NOTE — TELEPHONE ENCOUNTER
Last appointment: 4/8/25  Next appointment: 10/8/25  Previous refill encounter(s): 3/6/24    Requested Prescriptions     Pending Prescriptions Disp Refills    pravastatin (PRAVACHOL) 40 MG tablet [Pharmacy Med Name: PRAVASTATIN SODIUM 40 MG TAB] 90 tablet 3     Sig: TAKE 1 TABLET BY MOUTH AT BEDTIME         For Pharmacy Admin Tracking Only    Program: Medication Refill  CPA in place:    Recommendation Provided To:   Intervention Detail: New Rx: 1, reason: Patient Preference  Intervention Accepted By:   Gap Closed?:    Time Spent (min): 5

## 2025-06-04 RX ORDER — LOSARTAN POTASSIUM AND HYDROCHLOROTHIAZIDE 12.5; 5 MG/1; MG/1
1 TABLET ORAL DAILY
Qty: 90 TABLET | Refills: 3 | Status: SHIPPED | OUTPATIENT
Start: 2025-06-04

## 2025-06-04 NOTE — TELEPHONE ENCOUNTER
Last appointment: 4/8/25  Next appointment: 10/8/25  Previous refill encounter(s): 5/17/24    Requested Prescriptions     Pending Prescriptions Disp Refills    losartan-hydroCHLOROthiazide (HYZAAR) 50-12.5 MG per tablet [Pharmacy Med Name: LOSARTAN-HCTZ 50-12.5 MG TAB] 90 tablet 3     Sig: TAKE 1 TABLET BY MOUTH DAILY         For Pharmacy Admin Tracking Only    Program: Medication Refill  CPA in place:    Recommendation Provided To:   Intervention Detail: New Rx: 1, reason: Patient Preference  Intervention Accepted By:   Gap Closed?:    Time Spent (min): 5

## 2025-07-14 RX ORDER — OMEPRAZOLE 40 MG/1
40 CAPSULE, DELAYED RELEASE ORAL DAILY
Qty: 90 CAPSULE | Refills: 3 | Status: SHIPPED | OUTPATIENT
Start: 2025-07-14

## 2025-07-14 NOTE — TELEPHONE ENCOUNTER
Last appointment: 4/8/25  Next appointment: 10/8/25  Previous refill encounter(s): 7/12/24    Requested Prescriptions     Pending Prescriptions Disp Refills    omeprazole (PRILOSEC) 40 MG delayed release capsule [Pharmacy Med Name: OMEPRAZOLE DR 40 MG CAPSULE] 90 capsule 3     Sig: TAKE 1 CAPSULE BY MOUTH DAILY         For Pharmacy Admin Tracking Only    Program: Medication Refill  CPA in place:    Recommendation Provided To:   Intervention Detail: New Rx: 1, reason: Patient Preference  Intervention Accepted By:   Gap Closed?:    Time Spent (min): 5

## 2025-08-01 ENCOUNTER — HOSPITAL ENCOUNTER (OUTPATIENT)
Facility: HOSPITAL | Age: 70
Discharge: HOME | End: 2025-08-01
Attending: FAMILY MEDICINE
Payer: MEDICARE

## 2025-08-01 DIAGNOSIS — Z80.3 FAMILY HISTORY OF BREAST CANCER IN SISTER: ICD-10-CM

## 2025-08-01 DIAGNOSIS — Z12.39 ENCOUNTER FOR BREAST CANCER SCREENING OTHER THAN MAMMOGRAM: ICD-10-CM

## 2025-08-01 PROCEDURE — C8908 MRI W/O FOL W/CONT, BREAST,: HCPCS

## 2025-08-01 PROCEDURE — 6360000004 HC RX CONTRAST MEDICATION: Performed by: FAMILY MEDICINE

## 2025-08-01 PROCEDURE — A9585 GADOBUTROL INJECTION: HCPCS | Performed by: FAMILY MEDICINE

## 2025-08-01 RX ORDER — GADOBUTROL 604.72 MG/ML
11.5 INJECTION INTRAVENOUS
Status: COMPLETED | OUTPATIENT
Start: 2025-08-01 | End: 2025-08-01

## 2025-08-01 RX ADMIN — GADOBUTROL 11.5 ML: 604.72 INJECTION INTRAVENOUS at 11:43

## 2025-08-27 ENCOUNTER — ANESTHESIA EVENT (OUTPATIENT)
Facility: HOSPITAL | Age: 70
End: 2025-08-27
Payer: MEDICARE

## 2025-08-27 ENCOUNTER — HOSPITAL ENCOUNTER (OUTPATIENT)
Facility: HOSPITAL | Age: 70
Setting detail: OUTPATIENT SURGERY
Discharge: HOME | End: 2025-08-27
Attending: INTERNAL MEDICINE | Admitting: INTERNAL MEDICINE
Payer: MEDICARE

## 2025-08-27 ENCOUNTER — ANESTHESIA (OUTPATIENT)
Facility: HOSPITAL | Age: 70
End: 2025-08-27
Payer: MEDICARE

## 2025-08-27 VITALS
SYSTOLIC BLOOD PRESSURE: 123 MMHG | HEART RATE: 72 BPM | HEIGHT: 66 IN | TEMPERATURE: 98.2 F | WEIGHT: 260 LBS | RESPIRATION RATE: 16 BRPM | DIASTOLIC BLOOD PRESSURE: 58 MMHG | BODY MASS INDEX: 41.78 KG/M2 | OXYGEN SATURATION: 96 %

## 2025-08-27 PROCEDURE — 2709999900 HC NON-CHARGEABLE SUPPLY: Performed by: INTERNAL MEDICINE

## 2025-08-27 PROCEDURE — 2580000003 HC RX 258

## 2025-08-27 PROCEDURE — 88305 TISSUE EXAM BY PATHOLOGIST: CPT

## 2025-08-27 PROCEDURE — 7100000010 HC PHASE II RECOVERY - FIRST 15 MIN: Performed by: INTERNAL MEDICINE

## 2025-08-27 PROCEDURE — C1889 IMPLANT/INSERT DEVICE, NOC: HCPCS | Performed by: INTERNAL MEDICINE

## 2025-08-27 PROCEDURE — 6360000002 HC RX W HCPCS

## 2025-08-27 PROCEDURE — 3600007512: Performed by: INTERNAL MEDICINE

## 2025-08-27 PROCEDURE — 7100000011 HC PHASE II RECOVERY - ADDTL 15 MIN: Performed by: INTERNAL MEDICINE

## 2025-08-27 PROCEDURE — 3700000001 HC ADD 15 MINUTES (ANESTHESIA): Performed by: INTERNAL MEDICINE

## 2025-08-27 PROCEDURE — 3700000000 HC ANESTHESIA ATTENDED CARE: Performed by: INTERNAL MEDICINE

## 2025-08-27 PROCEDURE — 3600007502: Performed by: INTERNAL MEDICINE

## 2025-08-27 DEVICE — CLIP ENDOSCP 235CM RESOL 360 ORDER UOM IS EACH: Type: IMPLANTABLE DEVICE | Site: TRANSVERSE COLON | Status: FUNCTIONAL

## 2025-08-27 RX ORDER — LIDOCAINE HYDROCHLORIDE 20 MG/ML
INJECTION, SOLUTION EPIDURAL; INFILTRATION; INTRACAUDAL; PERINEURAL
Status: DISCONTINUED | OUTPATIENT
Start: 2025-08-27 | End: 2025-08-27 | Stop reason: SDUPTHER

## 2025-08-27 RX ORDER — SODIUM CHLORIDE 0.9 % (FLUSH) 0.9 %
5-40 SYRINGE (ML) INJECTION PRN
Status: CANCELLED | OUTPATIENT
Start: 2025-08-27

## 2025-08-27 RX ORDER — SODIUM CHLORIDE 0.9 % (FLUSH) 0.9 %
5-40 SYRINGE (ML) INJECTION EVERY 12 HOURS SCHEDULED
Status: CANCELLED | OUTPATIENT
Start: 2025-08-27

## 2025-08-27 RX ORDER — SODIUM CHLORIDE 9 MG/ML
INJECTION, SOLUTION INTRAVENOUS PRN
Status: CANCELLED | OUTPATIENT
Start: 2025-08-27

## 2025-08-27 RX ORDER — SODIUM CHLORIDE 9 MG/ML
INJECTION, SOLUTION INTRAVENOUS
Status: DISCONTINUED | OUTPATIENT
Start: 2025-08-27 | End: 2025-08-27 | Stop reason: SDUPTHER

## 2025-08-27 RX ADMIN — PROPOFOL 30 MG: 10 INJECTION, EMULSION INTRAVENOUS at 08:42

## 2025-08-27 RX ADMIN — SODIUM CHLORIDE: 900 INJECTION, SOLUTION INTRAVENOUS at 08:18

## 2025-08-27 RX ADMIN — PROPOFOL 50 MG: 10 INJECTION, EMULSION INTRAVENOUS at 08:33

## 2025-08-27 RX ADMIN — PROPOFOL 50 MG: 10 INJECTION, EMULSION INTRAVENOUS at 08:31

## 2025-08-27 RX ADMIN — PROPOFOL 30 MG: 10 INJECTION, EMULSION INTRAVENOUS at 08:45

## 2025-08-27 RX ADMIN — PROPOFOL 30 MG: 10 INJECTION, EMULSION INTRAVENOUS at 08:36

## 2025-08-27 RX ADMIN — PROPOFOL 30 MG: 10 INJECTION, EMULSION INTRAVENOUS at 08:39

## 2025-08-27 RX ADMIN — PROPOFOL 30 MG: 10 INJECTION, EMULSION INTRAVENOUS at 08:47

## 2025-08-27 RX ADMIN — PROPOFOL 50 MG: 10 INJECTION, EMULSION INTRAVENOUS at 08:26

## 2025-08-27 RX ADMIN — LIDOCAINE HYDROCHLORIDE 40 MG: 20 INJECTION, SOLUTION EPIDURAL; INFILTRATION; INTRACAUDAL; PERINEURAL at 08:31

## 2025-08-27 ASSESSMENT — PAIN - FUNCTIONAL ASSESSMENT: PAIN_FUNCTIONAL_ASSESSMENT: 0-10

## 2025-08-27 ASSESSMENT — PAIN SCALES - GENERAL: PAINLEVEL_OUTOF10: 0

## 2025-09-04 RX ORDER — CITALOPRAM HYDROBROMIDE 20 MG/1
20 TABLET ORAL DAILY
Qty: 90 TABLET | Refills: 3 | Status: SHIPPED | OUTPATIENT
Start: 2025-09-04

## (undated) DEVICE — FORCEPS BX L160CM DIA8MM GRSP DISECT CUP TIP NONLOCKING ROT

## (undated) DEVICE — TIP SUCT TRNSPAR RIB SURF STD BLB RIG NVENT W/ 5IN1 CONN DYND50138] MEDLINE INDUSTRIES INC]

## (undated) DEVICE — SET GRAV CK VLV NEEDLESS ST 3 GANGED 4WAY STPCOCK HI FLO 10

## (undated) DEVICE — SNARE ENDOSCP L240CM LOOP W27MM SHTH DIA2.4MM WRK CHN 2.8MM

## (undated) DEVICE — ENDOSCOPIC KIT COMPLIANCE ENDOKIT

## (undated) DEVICE — SYR 3ML LL TIP 1/10ML GRAD --

## (undated) DEVICE — TRAP ENDOSCP POLYP 2 CHMBR DRAWER TYP

## (undated) DEVICE — ELECTRODE,RADIOTRANSLUCENT,FOAM,5PK: Brand: MEDLINE

## (undated) DEVICE — SNARE ENDOSCP M L240CM W27MM SHTH DIA2.4MM CHN 2.8MM OVL

## (undated) DEVICE — SET ADMIN 16ML TBNG L100IN 2 Y INJ SITE IV PIGGY BK DISP

## (undated) DEVICE — CUFF BLD PRSS AD CLTH SGL TB W/ BAYNT CONN ROUNDED CORNER

## (undated) DEVICE — SOLIDIFIER MEDC 1200ML -- CONVERT TO 356117

## (undated) DEVICE — YANKAUER,TAPERED BULBOUS TIP,W/O VENT: Brand: MEDLINE

## (undated) DEVICE — NEEDLE SCLERO 25GA L240CM OD0.51MM ID0.24MM EXTN L4MM SHTH

## (undated) DEVICE — CONTAINER SPEC 20 ML LID NEUT BUFF FORMALIN 10 % POLYPR STS

## (undated) DEVICE — SYR 10ML LUER LOK 1/5ML GRAD --

## (undated) DEVICE — BASIN EMSIS 16OZ GRAPHITE PLAS KID SHP MOLD GRAD FOR ORAL

## (undated) DEVICE — Device

## (undated) DEVICE — CATH IV AUTOGRD BC PNK 20GA 25 -- INSYTE

## (undated) DEVICE — BLOCK BITE ENDOSCP AD 21 MM W/ DIL BLU LF DISP

## (undated) DEVICE — 1200 GUARD II KIT W/5MM TUBE W/O VAC TUBE: Brand: GUARDIAN

## (undated) DEVICE — IV START KIT: Brand: MEDLINE

## (undated) DEVICE — NEEDLE HYPO 18GA L1.5IN PNK S STL HUB POLYPR SHLD REG BVL

## (undated) DEVICE — Z DISCONTINUED PER MEDLINE LINE GAS SAMPLING O2/CO2 LNG AD 13 FT NSL W/ TBNG FILTERLINE

## (undated) DEVICE — NEONATAL-ADULT SPO2 SENSOR: Brand: NELLCOR

## (undated) DEVICE — FORCEPS BX L240CM JAW DIA2.4MM ORNG L CAP W/ NDL DISP RAD

## (undated) DEVICE — ELECTRODE PT RET AD L9FT HI MOIST COND ADH HYDRGEL CORDED

## (undated) DEVICE — NON-REM POLYHESIVE PATIENT RETURN ELECTRODE: Brand: VALLEYLAB

## (undated) DEVICE — IV START KIT WITH SECUREMENT DEVICES

## (undated) DEVICE — TOWEL 4 PLY TISS 19X30 SUE WHT

## (undated) DEVICE — SNARE ENDOSCP POLYP MED STD AD 2.4X27X240 CM 2.8 MM OVL SENS

## (undated) DEVICE — MARKER ENDOSCP 5 ML SYR SOLUTION FOR GI TRACT STRL

## (undated) DEVICE — STRAINER URIN CALC RNL MSH -- CONVERT TO ITEM 357634

## (undated) DEVICE — BAG SPEC BIOHZRD 10 X 10 IN --

## (undated) DEVICE — TRAP,MUCUS SPECIMEN, 80CC: Brand: MEDLINE